# Patient Record
Sex: MALE | Race: WHITE | NOT HISPANIC OR LATINO | Employment: FULL TIME | URBAN - NONMETROPOLITAN AREA
[De-identification: names, ages, dates, MRNs, and addresses within clinical notes are randomized per-mention and may not be internally consistent; named-entity substitution may affect disease eponyms.]

---

## 2017-03-21 LAB
HEP C VIRUS AB: NEGATIVE
HIV1/HIV2 ANTIBODY: NORMAL

## 2021-06-25 ENCOUNTER — OFFICE VISIT (OUTPATIENT)
Dept: FAMILY MEDICINE | Facility: CLINIC | Age: 41
End: 2021-06-25
Payer: COMMERCIAL

## 2021-06-25 VITALS
OXYGEN SATURATION: 100 % | HEIGHT: 69 IN | RESPIRATION RATE: 18 BRPM | BODY MASS INDEX: 31.27 KG/M2 | TEMPERATURE: 97 F | DIASTOLIC BLOOD PRESSURE: 78 MMHG | HEART RATE: 72 BPM | WEIGHT: 211.13 LBS | SYSTOLIC BLOOD PRESSURE: 122 MMHG

## 2021-06-25 DIAGNOSIS — B00.9 HERPES: Primary | ICD-10-CM

## 2021-06-25 DIAGNOSIS — F90.9 ATTENTION DEFICIT HYPERACTIVITY DISORDER (ADHD), UNSPECIFIED ADHD TYPE: ICD-10-CM

## 2021-06-25 PROCEDURE — 3008F PR BODY MASS INDEX (BMI) DOCUMENTED: ICD-10-PCS | Mod: ,,, | Performed by: FAMILY MEDICINE

## 2021-06-25 PROCEDURE — 99214 PR OFFICE/OUTPT VISIT, EST, LEVL IV, 30-39 MIN: ICD-10-PCS | Mod: ,,, | Performed by: FAMILY MEDICINE

## 2021-06-25 PROCEDURE — 3008F BODY MASS INDEX DOCD: CPT | Mod: ,,, | Performed by: FAMILY MEDICINE

## 2021-06-25 PROCEDURE — 99214 OFFICE O/P EST MOD 30 MIN: CPT | Mod: ,,, | Performed by: FAMILY MEDICINE

## 2021-06-25 RX ORDER — VALACYCLOVIR HYDROCHLORIDE 500 MG/1
500 TABLET, FILM COATED ORAL 2 TIMES DAILY
COMMUNITY
Start: 2021-04-30 | End: 2021-06-25 | Stop reason: SDUPTHER

## 2021-06-25 RX ORDER — DEXTROAMPHETAMINE SACCHARATE, AMPHETAMINE ASPARTATE, DEXTROAMPHETAMINE SULFATE AND AMPHETAMINE SULFATE 2.5; 2.5; 2.5; 2.5 MG/1; MG/1; MG/1; MG/1
1 TABLET ORAL 2 TIMES DAILY
Qty: 60 TABLET | Refills: 0 | Status: SHIPPED | OUTPATIENT
Start: 2021-06-25 | End: 2021-06-25 | Stop reason: SDUPTHER

## 2021-06-25 RX ORDER — VALACYCLOVIR HYDROCHLORIDE 500 MG/1
500 TABLET, FILM COATED ORAL 2 TIMES DAILY
Qty: 60 TABLET | Refills: 3 | Status: SHIPPED | OUTPATIENT
Start: 2021-06-25 | End: 2021-09-24 | Stop reason: SDUPTHER

## 2021-06-25 RX ORDER — DEXTROAMPHETAMINE SACCHARATE, AMPHETAMINE ASPARTATE, DEXTROAMPHETAMINE SULFATE AND AMPHETAMINE SULFATE 2.5; 2.5; 2.5; 2.5 MG/1; MG/1; MG/1; MG/1
1 TABLET ORAL 2 TIMES DAILY
Qty: 60 TABLET | Refills: 0 | Status: SHIPPED | OUTPATIENT
Start: 2021-06-25 | End: 2021-09-24 | Stop reason: SDUPTHER

## 2021-06-25 RX ORDER — DEXTROAMPHETAMINE SACCHARATE, AMPHETAMINE ASPARTATE, DEXTROAMPHETAMINE SULFATE AND AMPHETAMINE SULFATE 2.5; 2.5; 2.5; 2.5 MG/1; MG/1; MG/1; MG/1
1 TABLET ORAL 2 TIMES DAILY
COMMUNITY
Start: 2021-05-10 | End: 2021-06-25 | Stop reason: SDUPTHER

## 2021-07-01 ENCOUNTER — TELEPHONE (OUTPATIENT)
Dept: FAMILY MEDICINE | Facility: CLINIC | Age: 41
End: 2021-07-01

## 2021-09-24 ENCOUNTER — OFFICE VISIT (OUTPATIENT)
Dept: FAMILY MEDICINE | Facility: CLINIC | Age: 41
End: 2021-09-24
Payer: COMMERCIAL

## 2021-09-24 VITALS
BODY MASS INDEX: 32.68 KG/M2 | DIASTOLIC BLOOD PRESSURE: 86 MMHG | TEMPERATURE: 99 F | SYSTOLIC BLOOD PRESSURE: 128 MMHG | RESPIRATION RATE: 20 BRPM | WEIGHT: 220.63 LBS | HEIGHT: 69 IN | HEART RATE: 79 BPM | OXYGEN SATURATION: 98 %

## 2021-09-24 DIAGNOSIS — B00.9 HERPES: ICD-10-CM

## 2021-09-24 DIAGNOSIS — F90.9 ATTENTION DEFICIT HYPERACTIVITY DISORDER (ADHD), UNSPECIFIED ADHD TYPE: ICD-10-CM

## 2021-09-24 PROCEDURE — 3074F PR MOST RECENT SYSTOLIC BLOOD PRESSURE < 130 MM HG: ICD-10-PCS | Mod: ,,, | Performed by: FAMILY MEDICINE

## 2021-09-24 PROCEDURE — 3079F PR MOST RECENT DIASTOLIC BLOOD PRESSURE 80-89 MM HG: ICD-10-PCS | Mod: ,,, | Performed by: FAMILY MEDICINE

## 2021-09-24 PROCEDURE — 99213 PR OFFICE/OUTPT VISIT, EST, LEVL III, 20-29 MIN: ICD-10-PCS | Mod: ,,, | Performed by: FAMILY MEDICINE

## 2021-09-24 PROCEDURE — 99213 OFFICE O/P EST LOW 20 MIN: CPT | Mod: ,,, | Performed by: FAMILY MEDICINE

## 2021-09-24 PROCEDURE — 3008F PR BODY MASS INDEX (BMI) DOCUMENTED: ICD-10-PCS | Mod: ,,, | Performed by: FAMILY MEDICINE

## 2021-09-24 PROCEDURE — 1159F PR MEDICATION LIST DOCUMENTED IN MEDICAL RECORD: ICD-10-PCS | Mod: ,,, | Performed by: FAMILY MEDICINE

## 2021-09-24 PROCEDURE — 3008F BODY MASS INDEX DOCD: CPT | Mod: ,,, | Performed by: FAMILY MEDICINE

## 2021-09-24 PROCEDURE — 3074F SYST BP LT 130 MM HG: CPT | Mod: ,,, | Performed by: FAMILY MEDICINE

## 2021-09-24 PROCEDURE — 1159F MED LIST DOCD IN RCRD: CPT | Mod: ,,, | Performed by: FAMILY MEDICINE

## 2021-09-24 PROCEDURE — 3079F DIAST BP 80-89 MM HG: CPT | Mod: ,,, | Performed by: FAMILY MEDICINE

## 2021-09-24 RX ORDER — DEXTROAMPHETAMINE SACCHARATE, AMPHETAMINE ASPARTATE, DEXTROAMPHETAMINE SULFATE AND AMPHETAMINE SULFATE 2.5; 2.5; 2.5; 2.5 MG/1; MG/1; MG/1; MG/1
1 TABLET ORAL 2 TIMES DAILY
Qty: 60 TABLET | Refills: 0 | Status: SHIPPED | OUTPATIENT
Start: 2021-09-24 | End: 2021-11-12 | Stop reason: SDUPTHER

## 2021-09-24 RX ORDER — VALACYCLOVIR HYDROCHLORIDE 500 MG/1
500 TABLET, FILM COATED ORAL 2 TIMES DAILY
Qty: 60 TABLET | Refills: 3 | Status: CANCELLED | OUTPATIENT
Start: 2021-09-24

## 2021-09-24 RX ORDER — VALACYCLOVIR HYDROCHLORIDE 500 MG/1
500 TABLET, FILM COATED ORAL 2 TIMES DAILY
Qty: 60 TABLET | Refills: 3 | Status: SHIPPED | OUTPATIENT
Start: 2021-09-24 | End: 2021-11-12 | Stop reason: SDUPTHER

## 2021-11-12 ENCOUNTER — OFFICE VISIT (OUTPATIENT)
Dept: FAMILY MEDICINE | Facility: CLINIC | Age: 41
End: 2021-11-12
Payer: COMMERCIAL

## 2021-11-12 VITALS
WEIGHT: 221 LBS | DIASTOLIC BLOOD PRESSURE: 76 MMHG | SYSTOLIC BLOOD PRESSURE: 130 MMHG | TEMPERATURE: 97 F | HEIGHT: 70 IN | BODY MASS INDEX: 31.64 KG/M2 | OXYGEN SATURATION: 99 % | RESPIRATION RATE: 20 BRPM | HEART RATE: 72 BPM

## 2021-11-12 DIAGNOSIS — B00.9 HERPES: ICD-10-CM

## 2021-11-12 DIAGNOSIS — F90.9 ATTENTION DEFICIT HYPERACTIVITY DISORDER (ADHD), UNSPECIFIED ADHD TYPE: Primary | ICD-10-CM

## 2021-11-12 DIAGNOSIS — K21.00 GASTROESOPHAGEAL REFLUX DISEASE WITH ESOPHAGITIS WITHOUT HEMORRHAGE: ICD-10-CM

## 2021-11-12 LAB

## 2021-11-12 PROCEDURE — 3008F PR BODY MASS INDEX (BMI) DOCUMENTED: ICD-10-PCS | Mod: ,,, | Performed by: FAMILY MEDICINE

## 2021-11-12 PROCEDURE — 80305 POCT URINE DRUG SCREEN PRESUMP: ICD-10-PCS | Mod: QW,,, | Performed by: FAMILY MEDICINE

## 2021-11-12 PROCEDURE — 3078F PR MOST RECENT DIASTOLIC BLOOD PRESSURE < 80 MM HG: ICD-10-PCS | Mod: ,,, | Performed by: FAMILY MEDICINE

## 2021-11-12 PROCEDURE — 3075F PR MOST RECENT SYSTOLIC BLOOD PRESS GE 130-139MM HG: ICD-10-PCS | Mod: ,,, | Performed by: FAMILY MEDICINE

## 2021-11-12 PROCEDURE — 99214 PR OFFICE/OUTPT VISIT, EST, LEVL IV, 30-39 MIN: ICD-10-PCS | Mod: ,,, | Performed by: FAMILY MEDICINE

## 2021-11-12 PROCEDURE — 3075F SYST BP GE 130 - 139MM HG: CPT | Mod: ,,, | Performed by: FAMILY MEDICINE

## 2021-11-12 PROCEDURE — 99214 OFFICE O/P EST MOD 30 MIN: CPT | Mod: ,,, | Performed by: FAMILY MEDICINE

## 2021-11-12 PROCEDURE — 3008F BODY MASS INDEX DOCD: CPT | Mod: ,,, | Performed by: FAMILY MEDICINE

## 2021-11-12 PROCEDURE — 80305 DRUG TEST PRSMV DIR OPT OBS: CPT | Mod: QW,,, | Performed by: FAMILY MEDICINE

## 2021-11-12 PROCEDURE — 3078F DIAST BP <80 MM HG: CPT | Mod: ,,, | Performed by: FAMILY MEDICINE

## 2021-11-12 RX ORDER — VALACYCLOVIR HYDROCHLORIDE 500 MG/1
500 TABLET, FILM COATED ORAL 2 TIMES DAILY
Qty: 60 TABLET | Refills: 5 | Status: SHIPPED | OUTPATIENT
Start: 2021-11-12 | End: 2022-04-13 | Stop reason: SDUPTHER

## 2021-11-12 RX ORDER — DEXTROAMPHETAMINE SACCHARATE, AMPHETAMINE ASPARTATE MONOHYDRATE, DEXTROAMPHETAMINE SULFATE AND AMPHETAMINE SULFATE 2.5; 2.5; 2.5; 2.5 MG/1; MG/1; MG/1; MG/1
10 CAPSULE, EXTENDED RELEASE ORAL EVERY MORNING
Qty: 60 CAPSULE | Refills: 0 | Status: SHIPPED | OUTPATIENT
Start: 2021-11-12 | End: 2022-01-26 | Stop reason: CLARIF

## 2021-11-12 RX ORDER — OMEPRAZOLE 20 MG/1
20 TABLET, DELAYED RELEASE ORAL EVERY MORNING
COMMUNITY
End: 2022-04-13

## 2021-11-12 RX ORDER — DEXTROAMPHETAMINE SACCHARATE, AMPHETAMINE ASPARTATE MONOHYDRATE, DEXTROAMPHETAMINE SULFATE AND AMPHETAMINE SULFATE 2.5; 2.5; 2.5; 2.5 MG/1; MG/1; MG/1; MG/1
10 CAPSULE, EXTENDED RELEASE ORAL EVERY MORNING
Qty: 60 CAPSULE | Refills: 0 | Status: SHIPPED | OUTPATIENT
Start: 2021-11-12 | End: 2021-11-12

## 2021-11-12 RX ORDER — PANTOPRAZOLE SODIUM 40 MG/1
40 TABLET, DELAYED RELEASE ORAL DAILY
Qty: 60 TABLET | Refills: 11 | Status: SHIPPED | OUTPATIENT
Start: 2021-11-12 | End: 2022-04-13 | Stop reason: SDUPTHER

## 2021-11-12 RX ORDER — DEXTROAMPHETAMINE SACCHARATE, AMPHETAMINE ASPARTATE, DEXTROAMPHETAMINE SULFATE AND AMPHETAMINE SULFATE 2.5; 2.5; 2.5; 2.5 MG/1; MG/1; MG/1; MG/1
1 TABLET ORAL 2 TIMES DAILY
Qty: 60 TABLET | Refills: 0 | Status: SHIPPED | OUTPATIENT
Start: 2021-11-12 | End: 2022-01-26 | Stop reason: SDUPTHER

## 2022-01-14 ENCOUNTER — CLINICAL SUPPORT (OUTPATIENT)
Dept: PRIMARY CARE CLINIC | Facility: CLINIC | Age: 42
End: 2022-01-14

## 2022-01-14 DIAGNOSIS — Z11.1 SCREENING-PULMONARY TB: Primary | ICD-10-CM

## 2022-01-14 DIAGNOSIS — Z02.89 ENCOUNTER FOR PHYSICAL EXAMINATION RELATED TO EMPLOYMENT: ICD-10-CM

## 2022-01-14 PROCEDURE — 86580 TB INTRADERMAL TEST: CPT | Mod: ,,, | Performed by: NURSE PRACTITIONER

## 2022-01-14 PROCEDURE — 92552 PR PURE TONE AUDIOMETRY, AIR: ICD-10-PCS | Mod: ,,, | Performed by: NURSE PRACTITIONER

## 2022-01-14 PROCEDURE — 92552 PURE TONE AUDIOMETRY AIR: CPT | Mod: ,,, | Performed by: NURSE PRACTITIONER

## 2022-01-14 PROCEDURE — 86580 PR  TB INTRADERMAL TEST: ICD-10-PCS | Mod: ,,, | Performed by: NURSE PRACTITIONER

## 2022-01-14 NOTE — PROGRESS NOTES
Subjective:       Patient ID: Hemanth Cottrell is a 41 y.o. male.    Chief Complaint: No chief complaint on file.    HPI  Review of Systems      Objective:      Physical Exam    Assessment:       Problem List Items Addressed This Visit    None     Visit Diagnoses     Screening-pulmonary TB    -  Primary    Relevant Orders    POCT TB Skin Test (Completed)    Encounter for physical examination related to employment              Plan:       See scanned documents in .

## 2022-01-26 ENCOUNTER — OFFICE VISIT (OUTPATIENT)
Dept: FAMILY MEDICINE | Facility: CLINIC | Age: 42
End: 2022-01-26
Payer: COMMERCIAL

## 2022-01-26 VITALS
RESPIRATION RATE: 18 BRPM | BODY MASS INDEX: 31.64 KG/M2 | WEIGHT: 221 LBS | SYSTOLIC BLOOD PRESSURE: 126 MMHG | OXYGEN SATURATION: 99 % | DIASTOLIC BLOOD PRESSURE: 76 MMHG | HEART RATE: 80 BPM | HEIGHT: 70 IN

## 2022-01-26 DIAGNOSIS — F90.9 ATTENTION DEFICIT HYPERACTIVITY DISORDER (ADHD), UNSPECIFIED ADHD TYPE: ICD-10-CM

## 2022-01-26 DIAGNOSIS — B00.9 HERPES: ICD-10-CM

## 2022-01-26 PROCEDURE — 1159F MED LIST DOCD IN RCRD: CPT | Mod: ,,, | Performed by: FAMILY MEDICINE

## 2022-01-26 PROCEDURE — 99213 OFFICE O/P EST LOW 20 MIN: CPT | Mod: ,,, | Performed by: FAMILY MEDICINE

## 2022-01-26 PROCEDURE — 1159F PR MEDICATION LIST DOCUMENTED IN MEDICAL RECORD: ICD-10-PCS | Mod: ,,, | Performed by: FAMILY MEDICINE

## 2022-01-26 PROCEDURE — 3008F BODY MASS INDEX DOCD: CPT | Mod: ,,, | Performed by: FAMILY MEDICINE

## 2022-01-26 PROCEDURE — 99213 PR OFFICE/OUTPT VISIT, EST, LEVL III, 20-29 MIN: ICD-10-PCS | Mod: ,,, | Performed by: FAMILY MEDICINE

## 2022-01-26 PROCEDURE — 3074F PR MOST RECENT SYSTOLIC BLOOD PRESSURE < 130 MM HG: ICD-10-PCS | Mod: ,,, | Performed by: FAMILY MEDICINE

## 2022-01-26 PROCEDURE — 3074F SYST BP LT 130 MM HG: CPT | Mod: ,,, | Performed by: FAMILY MEDICINE

## 2022-01-26 PROCEDURE — 3078F PR MOST RECENT DIASTOLIC BLOOD PRESSURE < 80 MM HG: ICD-10-PCS | Mod: ,,, | Performed by: FAMILY MEDICINE

## 2022-01-26 PROCEDURE — 3008F PR BODY MASS INDEX (BMI) DOCUMENTED: ICD-10-PCS | Mod: ,,, | Performed by: FAMILY MEDICINE

## 2022-01-26 PROCEDURE — 3078F DIAST BP <80 MM HG: CPT | Mod: ,,, | Performed by: FAMILY MEDICINE

## 2022-01-26 RX ORDER — DEXTROAMPHETAMINE SACCHARATE, AMPHETAMINE ASPARTATE, DEXTROAMPHETAMINE SULFATE AND AMPHETAMINE SULFATE 2.5; 2.5; 2.5; 2.5 MG/1; MG/1; MG/1; MG/1
1 TABLET ORAL 2 TIMES DAILY
Qty: 60 TABLET | Refills: 0 | Status: SHIPPED | OUTPATIENT
Start: 2022-01-26 | End: 2022-04-13 | Stop reason: SDUPTHER

## 2022-01-26 RX ORDER — DEXTROAMPHETAMINE SACCHARATE, AMPHETAMINE ASPARTATE, DEXTROAMPHETAMINE SULFATE AND AMPHETAMINE SULFATE 2.5; 2.5; 2.5; 2.5 MG/1; MG/1; MG/1; MG/1
1 TABLET ORAL 2 TIMES DAILY
Qty: 60 TABLET | Refills: 0 | Status: SHIPPED | OUTPATIENT
Start: 2022-01-26 | End: 2022-01-26 | Stop reason: SDUPTHER

## 2022-01-26 NOTE — PROGRESS NOTES
Mateo Wilkinson DO   [address] [place]  [address]      PATIENT NAME: Hemanth Cottrell  : 1980  DATE: 22  MRN: [id number]      Billing Provider: Mateo Wilkinson DO  Level of Service:   Patient PCP Information     Provider PCP Type    Mateo Wilkinson DO General          Reason for Visit / Chief Complaint: ADHD (Routine visit for adderall refill)       Update PCP  Update Chief Complaint         History of Present Illness / Problem Focused Workflow     Hemanth Cottrell presents to the clinic with ADHD (Routine visit for adderall refill)     Patient with a history of a attention deficit disorder and takes Adderall for the same.  He has been tested formally.  He has no chest pain palpitations or side effects from the medication.  Patient was given the long-acting Adderall last month and he did not do well with the medication.  He requested to take the 10 mg Adderall twice daily as he responds better to that.      Review of Systems     Review of Systems   Constitutional: Negative for activity change, appetite change, chills, fatigue and fever.   HENT: Negative for nasal congestion, ear discharge, ear pain, mouth dryness, mouth sores, postnasal drip, sinus pressure/congestion, sore throat and voice change.    Eyes: Negative for pain, discharge, redness, itching and visual disturbance.   Respiratory: Negative for apnea, cough, chest tightness, shortness of breath and wheezing.    Cardiovascular: Negative for chest pain, palpitations and leg swelling.   Gastrointestinal: Negative for abdominal distention, abdominal pain, anal bleeding, blood in stool, change in bowel habit, constipation, diarrhea, nausea, vomiting, reflux and change in bowel habit.   Endocrine: Negative for cold intolerance, heat intolerance, polydipsia, polyphagia and polyuria.   Genitourinary: Negative for difficulty urinating, enuresis, erectile dysfunction, frequency, genital sores, hematuria, hot  flashes, menstrual irregularity, urgency and vaginal dryness.   Musculoskeletal: Negative for arthralgias, back pain, gait problem, leg pain, myalgias and neck pain.   Integumentary:  Negative for rash, mole/lesion, breast mass, breast discharge and breast tenderness.   Allergic/Immunologic: Negative for environmental allergies and food allergies.   Neurological: Negative for dizziness, vertigo, tremors, seizures, syncope, facial asymmetry, speech difficulty, weakness, light-headedness, numbness, headaches, disturbances in coordination, memory loss and coordination difficulties.   Hematological: Negative for adenopathy. Does not bruise/bleed easily.   Psychiatric/Behavioral: Negative for agitation, behavioral problems, confusion, decreased concentration, dysphoric mood, hallucinations, self-injury, sleep disturbance and suicidal ideas. The patient is not nervous/anxious and is not hyperactive.    Breast: Negative for mass and tenderness      Medical / Social / Family History     Past Medical History:   Diagnosis Date    Achilles tendinitis     Adult attention deficit hyperactivity disorder 04/30/2021    Allergic rhinitis     Anxiety disorder     COVID-19 08/2021    x 2 (02/2021)    Depressive disorder     Recurrent genital herpes simplex type 2 infection     Taking multiple medications for chronic disease        Past Surgical History:   Procedure Laterality Date    KNEE ARTHROSCOPY Left 2000       Social History    reports that he has never smoked. He has never used smokeless tobacco. He reports current alcohol use. He reports that he does not use drugs.    Family History  's family history includes Diabetes in his father and mother; Lung cancer in his father; No Known Problems in his brother and sister.    Medications and Allergies     Medications  Outpatient Medications Marked as Taking for the 1/26/22 encounter (Office Visit) with Mateo Wilkinson, DO   Medication Sig Dispense Refill     pantoprazole (PROTONIX) 40 MG tablet Take 1 tablet (40 mg total) by mouth once daily. Take 1 twice daily for 1 week then daily 60 tablet 11    valACYclovir (VALTREX) 500 MG tablet Take 1 tablet (500 mg total) by mouth 2 (two) times daily. 60 tablet 5    [DISCONTINUED] dextroamphetamine-amphetamine 10 mg Tab Take 1 tablet (10 mg total) by mouth 2 (two) times daily. 60 tablet 0       Allergies  Review of patient's allergies indicates:  No Known Allergies    Physical Examination     Vitals:    01/26/22 1126   BP: 126/76   Pulse: 80   Resp: 18     Physical Exam  Constitutional:       General: He is not in acute distress.     Appearance: Normal appearance. He is normal weight.   HENT:      Head: Normocephalic.      Nose: Nose normal.      Mouth/Throat:      Mouth: Mucous membranes are moist.      Pharynx: Oropharynx is clear. No oropharyngeal exudate or posterior oropharyngeal erythema.   Eyes:      General: No scleral icterus.        Right eye: No discharge.         Left eye: No discharge.      Conjunctiva/sclera: Conjunctivae normal.      Pupils: Pupils are equal, round, and reactive to light.   Cardiovascular:      Rate and Rhythm: Normal rate and regular rhythm.      Pulses: Normal pulses.      Heart sounds: Normal heart sounds. No murmur heard.      Pulmonary:      Effort: Pulmonary effort is normal.      Breath sounds: Normal breath sounds. No wheezing.   Abdominal:      General: Abdomen is flat. Bowel sounds are normal.      Tenderness: There is no abdominal tenderness.   Musculoskeletal:         General: Normal range of motion.      Cervical back: Normal range of motion.   Lymphadenopathy:      Cervical: No cervical adenopathy.   Skin:     General: Skin is warm.      Capillary Refill: Capillary refill takes less than 2 seconds.   Neurological:      General: No focal deficit present.      Mental Status: He is alert and oriented to person, place, and time. Mental status is at baseline.   Psychiatric:         Mood  and Affect: Mood normal.         Behavior: Behavior normal.         Thought Content: Thought content normal.         Judgment: Judgment normal.               Lab Results   Component Value Date    WBC 4.98 04/30/2021    HGB 15.7 04/30/2021    HCT 46.4 04/30/2021    MCV 89.9 04/30/2021     04/30/2021          Sodium   Date Value Ref Range Status   04/30/2021 136 136 - 145 mmol/L Final     Potassium   Date Value Ref Range Status   04/30/2021 3.8 3.5 - 5.1 mmol/L Final     Chloride   Date Value Ref Range Status   04/30/2021 103 98 - 107 mmol/L Final     CO2   Date Value Ref Range Status   04/30/2021 27 21 - 32 mmol/L Final     Glucose   Date Value Ref Range Status   04/30/2021 79 74 - 106 mg/dL Final     BUN   Date Value Ref Range Status   04/30/2021 12 7 - 18 mg/dL Final     Creatinine   Date Value Ref Range Status   04/30/2021 0.88 0.70 - 1.30 mg/dL Final     Calcium   Date Value Ref Range Status   04/30/2021 8.8 8.5 - 10.1 mg/dL Final     Total Protein   Date Value Ref Range Status   04/30/2021 7.5 6.4 - 8.2 g/dL Final     Albumin   Date Value Ref Range Status   04/30/2021 4.3 3.5 - 5.0 g/dL Final     Bilirubin, Total   Date Value Ref Range Status   04/30/2021 1.8 (H) >0.0 - 1.2 mg/dL Final     Alk Phos   Date Value Ref Range Status   04/30/2021 61 45 - 115 U/L Final     AST   Date Value Ref Range Status   04/30/2021 27 15 - 37 U/L Final     ALT   Date Value Ref Range Status   04/30/2021 30 16 - 61 U/L Final     Anion Gap   Date Value Ref Range Status   04/30/2021 10 7 - 16 mmol/L Final     eGFR   Date Value Ref Range Status   04/30/2021 100 >=60 mL/min/1.73m² Final      No image results found.     Procedures   Assessment and Plan (including Health Maintenance)      Problem List  Smart Sets  Document Outside HM   :    Plan:         Health Maintenance Due   Topic Date Due    Hepatitis C Screening  Never done    COVID-19 Vaccine (1) Never done    HIV Screening  Never done    TETANUS VACCINE  Never done     Influenza Vaccine (1) 09/01/2021       Problem List Items Addressed This Visit        Psychiatric    Attention deficit hyperactivity disorder (ADHD)    Current Assessment & Plan     Patient with history of attention deficit disorder and takes Adderall 10 mg twice daily.  Refilled today and did give him an extra month refill.   was pulled and no evidence of abuse.  Will do a presumptive drug screen at his next visit.         Relevant Medications    dextroamphetamine-amphetamine 10 mg Tab       ID    Herpes    Current Assessment & Plan     Patient takes Valtrex no change in his medicines follow-up p.r.n..               Health Maintenance Topics with due status: Not Due       Topic Last Completion Date    Lipid Panel 04/30/2021       Future Appointments   Date Time Provider Department Center   4/27/2022 10:30 AM Mateo Wilkinson DO [facility]        Follow up in about 4 weeks (around 2/23/2022).     Signature:  DO [facility]  [address] [address] [place]  [address]    Date of encounter: 1/26/22

## 2022-01-26 NOTE — ASSESSMENT & PLAN NOTE
Patient with history of attention deficit disorder and takes Adderall 10 mg twice daily.  Refilled today and did give him an extra month refill.   was pulled and no evidence of abuse.  Will do a presumptive drug screen at his next visit.

## 2022-04-13 ENCOUNTER — OFFICE VISIT (OUTPATIENT)
Dept: FAMILY MEDICINE | Facility: CLINIC | Age: 42
End: 2022-04-13
Payer: COMMERCIAL

## 2022-04-13 VITALS
SYSTOLIC BLOOD PRESSURE: 122 MMHG | RESPIRATION RATE: 18 BRPM | HEIGHT: 70 IN | DIASTOLIC BLOOD PRESSURE: 78 MMHG | HEART RATE: 85 BPM | BODY MASS INDEX: 29.92 KG/M2 | OXYGEN SATURATION: 98 % | TEMPERATURE: 98 F | WEIGHT: 209 LBS

## 2022-04-13 DIAGNOSIS — Z79.899 ENCOUNTER FOR LONG-TERM (CURRENT) USE OF MEDICATIONS: ICD-10-CM

## 2022-04-13 DIAGNOSIS — B00.9 HERPES: ICD-10-CM

## 2022-04-13 DIAGNOSIS — K21.00 GASTROESOPHAGEAL REFLUX DISEASE WITH ESOPHAGITIS WITHOUT HEMORRHAGE: ICD-10-CM

## 2022-04-13 DIAGNOSIS — E55.9 VITAMIN D DEFICIENCY: ICD-10-CM

## 2022-04-13 DIAGNOSIS — F90.9 ATTENTION DEFICIT HYPERACTIVITY DISORDER (ADHD), UNSPECIFIED ADHD TYPE: Primary | ICD-10-CM

## 2022-04-13 PROBLEM — U07.1 COVID-19: Status: RESOLVED | Noted: 2021-08-01 | Resolved: 2022-04-13

## 2022-04-13 LAB
CTP QC/QA: YES
POC (AMP) AMPHETAMINE: ABNORMAL
POC (BAR) BARBITURATES: NEGATIVE
POC (BUP) BUPRENORPHINE: NEGATIVE
POC (BZO) BENZODIAZEPINES: NEGATIVE
POC (COC) COCAINE: NEGATIVE
POC (MDMA) METHYLENEDIOXYMETHAMPHETAMINE 3,4: NEGATIVE
POC (MET) METHAMPHETAMINE: NEGATIVE
POC (MOP) OPIATES: NEGATIVE
POC (MTD) METHADONE: NEGATIVE
POC (OXY) OXYCODONE: NEGATIVE
POC (PCP) PHENCYCLIDINE: NEGATIVE
POC (TCA) TRICYCLIC ANTIDEPRESSANTS: NEGATIVE
POC TEMPERATURE (URINE): 90
POC THC: NEGATIVE

## 2022-04-13 PROCEDURE — 99213 PR OFFICE/OUTPT VISIT, EST, LEVL III, 20-29 MIN: ICD-10-PCS | Mod: ,,, | Performed by: NURSE PRACTITIONER

## 2022-04-13 PROCEDURE — 3008F PR BODY MASS INDEX (BMI) DOCUMENTED: ICD-10-PCS | Mod: ,,, | Performed by: NURSE PRACTITIONER

## 2022-04-13 PROCEDURE — 3074F SYST BP LT 130 MM HG: CPT | Mod: ,,, | Performed by: NURSE PRACTITIONER

## 2022-04-13 PROCEDURE — 1159F PR MEDICATION LIST DOCUMENTED IN MEDICAL RECORD: ICD-10-PCS | Mod: ,,, | Performed by: NURSE PRACTITIONER

## 2022-04-13 PROCEDURE — 1159F MED LIST DOCD IN RCRD: CPT | Mod: ,,, | Performed by: NURSE PRACTITIONER

## 2022-04-13 PROCEDURE — 3078F DIAST BP <80 MM HG: CPT | Mod: ,,, | Performed by: NURSE PRACTITIONER

## 2022-04-13 PROCEDURE — 80305 POCT URINE DRUG SCREEN PRESUMP: ICD-10-PCS | Mod: QW,,, | Performed by: NURSE PRACTITIONER

## 2022-04-13 PROCEDURE — 3008F BODY MASS INDEX DOCD: CPT | Mod: ,,, | Performed by: NURSE PRACTITIONER

## 2022-04-13 PROCEDURE — 1160F RVW MEDS BY RX/DR IN RCRD: CPT | Mod: ,,, | Performed by: NURSE PRACTITIONER

## 2022-04-13 PROCEDURE — 3044F HG A1C LEVEL LT 7.0%: CPT | Mod: ,,, | Performed by: NURSE PRACTITIONER

## 2022-04-13 PROCEDURE — 3044F PR MOST RECENT HEMOGLOBIN A1C LEVEL <7.0%: ICD-10-PCS | Mod: ,,, | Performed by: NURSE PRACTITIONER

## 2022-04-13 PROCEDURE — 1160F PR REVIEW ALL MEDS BY PRESCRIBER/CLIN PHARMACIST DOCUMENTED: ICD-10-PCS | Mod: ,,, | Performed by: NURSE PRACTITIONER

## 2022-04-13 PROCEDURE — 3074F PR MOST RECENT SYSTOLIC BLOOD PRESSURE < 130 MM HG: ICD-10-PCS | Mod: ,,, | Performed by: NURSE PRACTITIONER

## 2022-04-13 PROCEDURE — 3078F PR MOST RECENT DIASTOLIC BLOOD PRESSURE < 80 MM HG: ICD-10-PCS | Mod: ,,, | Performed by: NURSE PRACTITIONER

## 2022-04-13 PROCEDURE — 99213 OFFICE O/P EST LOW 20 MIN: CPT | Mod: ,,, | Performed by: NURSE PRACTITIONER

## 2022-04-13 PROCEDURE — 80305 DRUG TEST PRSMV DIR OPT OBS: CPT | Mod: QW,,, | Performed by: NURSE PRACTITIONER

## 2022-04-13 RX ORDER — VALACYCLOVIR HYDROCHLORIDE 500 MG/1
500 TABLET, FILM COATED ORAL 2 TIMES DAILY
Qty: 60 TABLET | Refills: 5 | Status: SHIPPED | OUTPATIENT
Start: 2022-04-13 | End: 2022-05-02 | Stop reason: SDUPTHER

## 2022-04-13 RX ORDER — DEXTROAMPHETAMINE SACCHARATE, AMPHETAMINE ASPARTATE, DEXTROAMPHETAMINE SULFATE AND AMPHETAMINE SULFATE 2.5; 2.5; 2.5; 2.5 MG/1; MG/1; MG/1; MG/1
1 TABLET ORAL 2 TIMES DAILY
Qty: 60 TABLET | Refills: 0 | Status: SHIPPED | OUTPATIENT
Start: 2022-04-13 | End: 2022-04-13 | Stop reason: SDUPTHER

## 2022-04-13 RX ORDER — DEXTROAMPHETAMINE SACCHARATE, AMPHETAMINE ASPARTATE, DEXTROAMPHETAMINE SULFATE AND AMPHETAMINE SULFATE 2.5; 2.5; 2.5; 2.5 MG/1; MG/1; MG/1; MG/1
1 TABLET ORAL 2 TIMES DAILY
Qty: 60 TABLET | Refills: 0 | Status: SHIPPED | OUTPATIENT
Start: 2022-04-13 | End: 2022-05-02 | Stop reason: SDUPTHER

## 2022-04-13 RX ORDER — PANTOPRAZOLE SODIUM 40 MG/1
40 TABLET, DELAYED RELEASE ORAL 2 TIMES DAILY
Qty: 60 TABLET | Refills: 5 | Status: SHIPPED | OUTPATIENT
Start: 2022-04-13 | End: 2022-05-02 | Stop reason: SDUPTHER

## 2022-04-13 RX ORDER — ERGOCALCIFEROL 1.25 MG/1
50000 CAPSULE ORAL
Qty: 4 CAPSULE | Refills: 2 | Status: SHIPPED | OUTPATIENT
Start: 2022-04-13 | End: 2022-06-20

## 2022-04-13 RX ORDER — ERGOCALCIFEROL 1.25 MG/1
50000 CAPSULE ORAL
COMMUNITY
Start: 2022-02-08 | End: 2022-04-13 | Stop reason: SDUPTHER

## 2022-04-13 NOTE — PROGRESS NOTES
"   JACINTO Norman   [facility]  [address]  [place] [address]     PATIENT NAME: Hemanth Cottrell  : 1980  DATE: 22  MRN: [id number]      Billing Provider: JACINTO Norman  Level of Service: IN OFFICE/OUTPT VISIT, EST, LEVL III, 20-29 MIN  Patient PCP Information     Provider PCP Type    Mateo Wilkinson DO General          Reason for Visit / Chief Complaint: Follow-up ("Medication refill")       Update PCP  Update Chief Complaint         History of Present Illness / Problem Focused Workflow     Hemanth Cottrell presents to the clinic for refills on routine medication.   Hx of ADHD and reports having testing in the past 5 years.      Review of Systems     Review of Systems   Constitutional: Negative.  Negative for activity change, appetite change, fatigue and unexpected weight change.   Eyes: Negative for visual disturbance.   Respiratory: Negative for cough, chest tightness and shortness of breath.    Cardiovascular: Negative for chest pain, palpitations and leg swelling.   Gastrointestinal: Negative for abdominal pain, change in bowel habit, nausea, vomiting and change in bowel habit.   Endocrine: Negative for cold intolerance, heat intolerance, polyphagia and polyuria.   Neurological: Negative for dizziness, weakness and headaches.   Psychiatric/Behavioral: The patient is not hyperactive.         Medical / Social / Family History     Past Medical History:   Diagnosis Date    Achilles tendinitis     Allergic rhinitis     Anxiety disorder     COVID-19 08/2021    x 2 (2021)    Depressive disorder     Recurrent genital herpes simplex type 2 infection     Taking multiple medications for chronic disease        Past Surgical History:   Procedure Laterality Date    KNEE ARTHROSCOPY Left        Social History    reports that he has never smoked. He has never used smokeless tobacco. He reports current alcohol use. He reports that he does not use " "drugs.    Family History  's family history includes Diabetes in his father and mother; Lung cancer in his father; No Known Problems in his brother and sister.    Medications and Allergies     Medications  Outpatient Medications Marked as Taking for the 4/13/22 encounter (Office Visit) with JACINTO Guo   Medication Sig Dispense Refill    semaglutide (OZEMPIC) 1 mg/dose (2 mg/1.5 mL) PnIj Inject 1 mg into the skin every 7 days.      [DISCONTINUED] dextroamphetamine-amphetamine 10 mg Tab Take 1 tablet (10 mg total) by mouth 2 (two) times daily. 60 tablet 0    [DISCONTINUED] pantoprazole (PROTONIX) 40 MG tablet Take 1 tablet (40 mg total) by mouth once daily. Take 1 twice daily for 1 week then daily 60 tablet 11    [DISCONTINUED] valACYclovir (VALTREX) 500 MG tablet Take 1 tablet (500 mg total) by mouth 2 (two) times daily. 60 tablet 5       Allergies  Review of patient's allergies indicates:  No Known Allergies    Physical Examination     Vitals:    04/13/22 1039   BP: 122/78   Pulse: 85   Resp: 18   Temp: 98.1 °F (36.7 °C)   SpO2: 98%   Weight: 94.8 kg (209 lb)   Height: 5' 10" (1.778 m)      Physical Exam  Constitutional:       General: He is not in acute distress.     Appearance: Normal appearance.   Neck:      Thyroid: No thyromegaly.      Vascular: No carotid bruit.   Cardiovascular:      Rate and Rhythm: Normal rate and regular rhythm.      Pulses: Normal pulses.      Heart sounds: Normal heart sounds. No murmur heard.  Pulmonary:      Effort: Pulmonary effort is normal. No accessory muscle usage or respiratory distress.      Breath sounds: Normal breath sounds. No wheezing, rhonchi or rales.   Abdominal:      General: Bowel sounds are normal.      Palpations: Abdomen is soft.   Musculoskeletal:         General: Normal range of motion.      Cervical back: Neck supple.   Skin:     General: Skin is warm and dry.      Capillary Refill: Capillary refill takes less than 2 seconds.      Coloration: " Skin is not jaundiced or pale.   Neurological:      Mental Status: He is alert and oriented to person, place, and time.      Cranial Nerves: Cranial nerves are intact.      Gait: Gait is intact.   Psychiatric:         Mood and Affect: Mood normal.         Behavior: Behavior normal.          Assessment and Plan (including Health Maintenance)      Problem List  Smart Sets  Document Outside HM   :        Health Maintenance Due   Topic Date Due    Hepatitis C Screening  Never done    COVID-19 Vaccine (1) Never done    HIV Screening  Never done    TETANUS VACCINE  Never done    Influenza Vaccine (1) 09/01/2021       Problem List Items Addressed This Visit        Psychiatric    Attention deficit hyperactivity disorder (ADHD) - Primary    Relevant Medications    dextroamphetamine-amphetamine 10 mg Tab       Other    Gastroesophageal reflux disease with esophagitis without hemorrhage    Relevant Medications    pantoprazole (PROTONIX) 40 MG tablet      Other Visit Diagnoses     Herpes        Continue current medication    Relevant Medications    valACYclovir (VALTREX) 500 MG tablet    Vitamin D deficiency        Continue current medication    Relevant Medications    ergocalciferol (ERGOCALCIFEROL) 50,000 unit Cap    Encounter for long-term (current) use of medications        Relevant Orders    POCT Urine Drug Screen Presump (Completed)        Attention deficit hyperactivity disorder (ADHD), unspecified ADHD type  Comments:  Continue current medication. Denies diversion,  good, UDS consistent, no signs of aberrant behavior noted  Orders:  -     Discontinue: dextroamphetamine-amphetamine 10 mg Tab; Take 1 tablet (10 mg total) by mouth 2 (two) times daily.  Dispense: 60 tablet; Refill: 0  -     dextroamphetamine-amphetamine 10 mg Tab; Take 1 tablet (10 mg total) by mouth 2 (two) times daily.  Dispense: 60 tablet; Refill: 0    Herpes  Comments:  Continue current medication  Orders:  -     valACYclovir (VALTREX) 500 MG  tablet; Take 1 tablet (500 mg total) by mouth 2 (two) times daily.  Dispense: 60 tablet; Refill: 5    Gastroesophageal reflux disease with esophagitis without hemorrhage  -     pantoprazole (PROTONIX) 40 MG tablet; Take 1 tablet (40 mg total) by mouth 2 (two) times daily.  Dispense: 60 tablet; Refill: 5    Vitamin D deficiency  Comments:  Continue current medication  Orders:  -     ergocalciferol (ERGOCALCIFEROL) 50,000 unit Cap; Take 1 capsule (50,000 Units total) by mouth every 7 days.  Dispense: 4 capsule; Refill: 2    Encounter for long-term (current) use of medications  -     POCT Urine Drug Screen Presump       Health Maintenance Topics with due status: Not Due       Topic Last Completion Date    Lipid Panel 02/07/2022       Procedures     Future Appointments   Date Time Provider Department Center   4/27/2022 10:30 AM Mateo Wilkinson DO [facility]        Follow up in about 2 months (around 6/13/2022).     Signature:  JACINTO Norman    Date of encounter: 4/13/22     HPI:  61 yo M pmh htn, hld, ROSS, depression presents to the ED with abdominal and back pain. States the pain started suddenly today and was radiating from mid back to the front. Patient thought he has muscle pain and went to chiropractor had heat therapy and offered no relief. States that the pain has significantly decreased since he has been in the ED. Denies fevers, chills, nausea, vomiting, CP, SOB, palpitations. 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 Also states that he had been seeing psychiatrist and is taking antidepressant medications due to recent death of his son. Also states that his daughter recently finished drug rehab.     In the ED, VS 97.7, HR 99, /78, RR 12, SpO2 97 on rm air. WBC 12.2, Hg 15.5, Hct 46.0, platelet 206, Na 135, K 4.7, Cl 100, Co2 27, BUN 13, Cr 0.87, glucose 126, , ALT 97, Lipase 1007, Creatine kinase 361, troponin <0.015, ProBNP 22. UA small ketones, pH 9.0    Detox:  Chart Reviewed   feels better today  Drinks Vodka 3 to 4 per day  last drank 2 days ago  minimal tremor  no Cocaine or POT  On Klonopin states 1 mg BID Istop checked     Imaging:   CT angio chest, abdomen and pelvis with IV contast: No evidence of aortic dissection. Inflammatory change along the head of the pancreas most likely   pancreatitis. Small area of low density along the posterior aspect of the pancreatic head may be related to edema although early necrosis or an underlying lesion are not excluded. Follow-up CT or MRI recommended if there are no contraindications.  Low density within the portal vein/SMV in the region of the pancreatic head is presumably related to mixing of opacified and unopacified blood. This can also be further evaluated on follow-up imaging.  Gallbladder US: no gallstones  EKG: NSR at 88 bpm    Received flexeril 10 mg oral x 1, 1 L NaCl 0.9% bolus, famotidine 20 mg IV x 1, morphone 4 mg IV x1, zofran 4 mg IV x 1. (15 Mar 2018 03:57)      PAST MEDICAL & SURGICAL HISTORY:  Low testosterone in male  Depression  Insomnia  HLD (hyperlipidemia)  HTN (hypertension)  H/O knee surgery: b/l knees  H/O shoulder surgery: right      Allergies    No Known Allergies    Intolerances        FAMILY HISTORY:  Family history of squamous cell carcinoma (Father): of parotid  Family history of hypertension (Father)      SOCIAL HISTORY:    REVIEW OF SYSTEMS:    Constitutional: No fever, weight loss or fatigue  ENT:  No difficulty hearing, tinnitus, vertigo; No sinus or throat pain  Neck: No pain or stiffness  Respiratory: No cough, wheezing, chills or hemoptysis  Cardiovascular: No chest pain, palpitations, shortness of breath, dizziness or leg swelling  Gastrointestinal: No abdominal or epigastric pain. No nausea, vomiting or hematemesis; No diarrhea or constipation. No melena or hematochezia.  Neurological: No headaches, memory loss, loss of strength, numbness or tremors  Musculoskeletal: No joint pain or swelling; No muscle, back or extremity pain  Psychiatric: No depression, anxiety, mood swings or difficulty sleeping    MEDICATIONS  (STANDING):  amLODIPine   Tablet 10 milliGRAM(s) Oral daily  atorvastatin 20 milliGRAM(s) Oral at bedtime  clonazePAM Tablet 1 milliGRAM(s) Oral two times a day  enoxaparin Injectable 40 milliGRAM(s) SubCutaneous daily  escitalopram 20 milliGRAM(s) Oral at bedtime  folic acid 1 milliGRAM(s) Oral daily  lisinopril 40 milliGRAM(s) Oral two times a day  multivitamin 1 Tablet(s) Oral daily  pantoprazole  Injectable 40 milliGRAM(s) IV Push daily  QUEtiapine 100 milliGRAM(s) Oral at bedtime  sodium chloride 0.9%. 1000 milliLiter(s) (125 mL/Hr) IV Continuous <Continuous>  thiamine 100 milliGRAM(s) Oral daily    MEDICATIONS  (PRN):  LORazepam   Injectable 2 milliGRAM(s) IV Push every 2 hours PRN CIWA-Ar score increase by 2 points and a total score of 7 or less  morphine  - Injectable 2 milliGRAM(s) IV Push every 6 hours PRN Moderate Pain (4 - 6)  morphine  - Injectable 4 milliGRAM(s) IV Push every 6 hours PRN Severe Pain (7 - 10)  morphine  - Injectable 1 milliGRAM(s) IV Push every 6 hours PRN Mild Pain (1 - 3)      Vital Signs Last 24 Hrs  T(C): 36.2 (15 Mar 2018 06:41), Max: 36.7 (15 Mar 2018 05:40)  T(F): 97.2 (15 Mar 2018 06:41), Max: 98 (15 Mar 2018 05:40)  HR: 84 (15 Mar 2018 06:41) (84 - 99)  BP: 151/84 (15 Mar 2018 06:41) (142/78 - 151/84)  BP(mean): --  RR: 16 (15 Mar 2018 06:41) (12 - 16)  SpO2: 93% (15 Mar 2018 06:41) (93% - 97%)    PHYSICAL EXAM:    Constitutional: NAD, well-groomed, well-developed  HEENT: PERRLA, EOMI, Normal Hearing, MMM  Neck: No LAD, No JVD  Back: Normal spine flexure, No CVA tenderness  Respiratory: CTAB/L  Cardiovascular: S1 and S2, RRR, no M/G/R  Gastrointestinal: BS+, soft, slight mid epigastric tenderness  Extremities: No peripheral edema  Vascular: 2+ peripheral pulses  Neurological: A/O x 3, no focal deficits no tremors    LABS:                        15.5   12.2  )-----------( 206      ( 14 Mar 2018 23:12 )             46.0     03-14    135  |  100  |  13  ----------------------------<  126<H>  4.7   |  27  |  0.87    Ca    8.4<L>      14 Mar 2018 23:12  Phos  3.0     03-15  Mg     1.8     03-15    TPro  7.8  /  Alb  3.5  /  TBili  0.6  /  DBili  x   /  AST  101<H>  /  ALT  97<H>  /  AlkPhos  99  03-14      Urinalysis Basic - ( 15 Mar 2018 01:25 )    Color: Yellow / Appearance: Clear / S.010 / pH: x  Gluc: x / Ketone: Small  / Bili: Negative / Urobili: Negative   Blood: x / Protein: Negative / Nitrite: Negative   Leuk Esterase: Negative / RBC: x / WBC x   Sq Epi: x / Non Sq Epi: x / Bacteria: x      Drug Screen Urine:  Alcohol Level        RADIOLOGY & ADDITIONAL STUDIES:    Assessment and Plan:    Alcoholism: Monitor  for withdrawal                   Restart Klonopin                   Send Tox Screen                   Discussed with Dr. Curry Chief Complaint:  Patient is a 60y old  Male who presents with a chief complaint of abdominal pain (15 Mar 2018 03:57)      HPI:61 yo M pmh htn, hld, ROSS, depression presents to the ED with abdominal and back pain. States the pain started suddenly today and was radiating from mid back to the front. Patient thought he has muscle pain and went to chiropractor had heat therapy and offered no relief. States that the pain has significantly decreased since he has been in the ED. Denies fevers, chills, nausea, vomiting, CP, SOB, palpitations. 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 Also states that he had been seeing psychiatrist and is taking antidepressant medications due to recent death of his son. Also states that his daughter recently finished drug rehab.     Imaging:   CT angio chest, abdomen and pelvis with IV contast: No evidence of aortic dissection. Inflammatory Call to make an appointment [phone] along the head of the pancreas most likely   pancreatitis. Small area of low density along the posterior aspect of the pancreatic head may be related to edema although early necrosis or an underlying lesion are not excluded. Follow-up CT or MRI recommended if there are no contraindications.  Low density within the portal vein/SMV in the region of the pancreatic head is presumably related to mixing of opacified and unopacified blood. This can also be further evaluated on follow-up imaging.  Gallbladder US: no gallstones  EKG: NSR at 88 bpm    Pt states he had an EGD and colonoscopy within 5-7 years which were reportedly normal.       Allergies:  No Known Allergies      Medications:  amLODIPine   Tablet 10 milliGRAM(s) Oral daily  atorvastatin 20 milliGRAM(s) Oral at bedtime  clonazePAM Tablet 1 milliGRAM(s) Oral two times a day  enoxaparin Injectable 40 milliGRAM(s) SubCutaneous daily  escitalopram 20 milliGRAM(s) Oral at bedtime  folic acid 1 milliGRAM(s) Oral daily  lisinopril 40 milliGRAM(s) Oral two times a day  LORazepam   Injectable 2 milliGRAM(s) IV Push every 2 hours PRN  morphine  - Injectable 2 milliGRAM(s) IV Push every 6 hours PRN  morphine  - Injectable 4 milliGRAM(s) IV Push every 6 hours PRN  morphine  - Injectable 1 milliGRAM(s) IV Push every 6 hours PRN  multivitamin 1 Tablet(s) Oral daily  pantoprazole  Injectable 40 milliGRAM(s) IV Push daily  QUEtiapine 100 milliGRAM(s) Oral at bedtime  sodium chloride 0.9%. 1000 milliLiter(s) IV Continuous <Continuous>  thiamine 100 milliGRAM(s) Oral daily      PMHX/PSHX:  Low testosterone in male  Depression  Insomnia  HLD (hyperlipidemia)  HTN (hypertension)  H/O knee surgery  H/O shoulder surgery      Family history:  Family history of squamous cell carcinoma (Father)  Family history of hypertension (Father)      Social History: admits to atleast 4 drinks daily, denies tobacco  and illicit drug use.     ROS:     General:  No wt loss, fevers, chills, night sweats, fatigue,   Eyes:  Good vision, no reported pain  ENT:  No sore throat, pain, runny nose, dysphagia  CV:  No pain, palpitations, hypo/hypertension  Resp:  No dyspnea, cough, tachypnea, wheezing  GI:  + diffuse mid abdominal pain, No nausea, No vomiting, No diarrhea, No constipation, No weight loss, No fever, No pruritis, No rectal bleeding, No tarry stools, No dysphagia,  :  No pain, bleeding, incontinence, nocturia  Muscle:  No pain, weakness  Neuro:  No weakness, tingling, memory problems  Psych:  No fatigue, insomnia, mood problems, depression  Endocrine:  No polyuria, polydipsia, cold/heat intolerance  Heme:  No petechiae, ecchymosis, easy bruisability  Skin:  No rash, tattoos, scars, edema      PHYSICAL EXAM:   Vital Signs:  Vital Signs Last 24 Hrs  T(C): 36.2 (15 Mar 2018 06:41), Max: 36.7 (15 Mar 2018 05:40)  T(F): 97.2 (15 Mar 2018 06:41), Max: 98 (15 Mar 2018 05:40)  HR: 84 (15 Mar 2018 06:41) (84 - 99)  BP: 151/84 (15 Mar 2018 06:41) (142/78 - 151/84)  BP(mean): --  RR: 16 (15 Mar 2018 06:41) (12 - 16)  SpO2: 93% (15 Mar 2018 06:41) (93% - 97%)  Daily     Daily Weight in k.4 (15 Mar 2018 06:41)    GENERAL:  Appears stated age, well-groomed, well-nourished, no distress  HEENT:  NC/AT,  conjunctivae clear and pink, no thyromegaly, nodules, adenopathy, no JVD, sclera -anicteric  CHEST:  Full & symmetric excursion, no increased effort, breath sounds clear  HEART:  Regular rhythm, S1, S2, no murmur/rub/S3/S4, no abdominal bruit, no edema  ABDOMEN:  Soft, non-tender, non-distended, normoactive bowel sounds,  no masses ,no hepato-splenomegaly, no signs of chronic liver disease  EXTREMITIES:  no cyanosis,clubbing or edema  SKIN:  No rash/erythema/ecchymoses/petechiae/wounds/abscess/warm/dry  NEURO:  Alert, oriented, no asterixis, no tremor, no encephalopathy    LABS:                        15.5   12.2  )-----------( 206      ( 14 Mar 2018 23:12 )             46.0     03-14    135  |  100  |  13  ----------------------------<  126<H>  4.7   |  27  |  0.87    Ca    8.4<L>      14 Mar 2018 23:12  Phos  3.0     -15  Mg     1.8     -15    TPro  7.8  /  Alb  3.5  /  TBili  0.6  /  DBili  x   /  AST  101<H>  /  ALT  97<H>  /  AlkPhos  99  03-14    LIVER FUNCTIONS - ( 14 Mar 2018 23:12 )  Alb: 3.5 g/dL / Pro: 7.8 g/dL / ALK PHOS: 99 U/L / ALT: 97 U/L / AST: 101 U/L / GGT: x             Urinalysis Basic - ( 15 Mar 2018 01:25 )    Color: Yellow / Appearance: Clear / S.010 / pH: x  Gluc: x / Ketone: Small  / Bili: Negative / Urobili: Negative   Blood: x / Protein: Negative / Nitrite: Negative   Leuk Esterase: Negative / RBC: x / WBC x   Sq Epi: x / Non Sq Epi: x / Bacteria: x      Amylase Serum63      Lipase [id number]       Ammonia--      Imaging:< from: CT Angio Abdomen and Pelvis w/ IV Cont (03.15.18 @ 01:06) >  EXAM:  CT ANGIO CHEST (W)AW IC                            PROCEDURE DATE:  03/15/2018          INTERPRETATION:  CLINICAL INFORMATION: Chest pain radiating to the back    COMPARISON: None    PROCEDURE:   CT Angiography of the Chest, Abdomen and Pelvis.   Precontrast imaging was performed through the chest followed by arterial   phase imaging of the chest, abdomen and pelvis.  Intravenous contrast: 95 ml Omnipaque 350.   Oral contrast:None.  Sagittal and coronal reformats were performed as well as MIPS.  FINDINGS:    CHEST:     LUNGS, LARGE AIRWAYS, PLEURA: Patent central airways. No consolidation or   pneumothorax.  No pleural effusion. Bibasilar atelectasis.  VESSELS: Coronary atherosclerosis. No evidence of aortic dissection.  HEART: Heart size is normal. No pericardial effusion.  MEDIASTINUM AND ANDREW: No lymphadenopathy.  CHEST WALL AND LOWER NECK: Degenerative changes along the spine.    ABDOMEN AND PELVIS:    Arterial phase imaging of the following:  LIVER: Hepatic steatosis  GALLBLADDER: Within normal limits.  SPLEEN: Within normal limits.  PANCREAS: Inflammatory change along the head of the pancreas compatible   with pancreatitis. Small 1.6 cm area of low density alongthe posterior   head may represent edema although early necrosis or underlying lesion not   excluded.  ADRENALS: Within normal limits.  KIDNEYS/URETERS: Right renal hypodensity is too small to characterize. No   hydronephrosis    BLADDER: Collapsed  REPRODUCTIVE ORGANS: Unremarkable    BOWEL: Limited without oral contrast. No evidence of bowel obstruction.   Normal appendix  PERITONEUM: No ascites.  VESSELS:  Aortic atherosclerosis. Low density along the portal vein in   the region of the pancreas is likely related to mixing of opacified and   unopacified blood.  RETROPERITONEUM: No lymphadenopathy.    ABDOMINAL WALL: Small fat-containing left inguinal hernia  BONES: Degenerative changes    IMPRESSION:    No evidence of aortic dissection.     Inflammatory change along the head of the pancreas most likely   pancreatitis. Small area of low density along the posterior aspect of the   pancreatic head may be related to edema although early necrosis or an   underlying lesion are not excluded. Follow-up CT or MRI recommended if   there are no contraindications.    Low density within the portal vein/SMV in the region of the pancreatic   head is presumably related to mixing of opacified and unopacified blood.   This can also be further evaluated on follow-up imaging.                  LAURA STOVER M.D., ATTENDING RADIOLOGIST  This document has been electronically signed. Mar 15 2018  2:06AM

## 2022-05-01 ENCOUNTER — HOSPITAL ENCOUNTER (EMERGENCY)
Facility: HOSPITAL | Age: 42
Discharge: HOME | End: 2022-05-01
Attending: EMERGENCY MEDICINE
Payer: COMMERCIAL

## 2022-05-01 VITALS
HEIGHT: 72 IN | OXYGEN SATURATION: 96 % | BODY MASS INDEX: 28.85 KG/M2 | RESPIRATION RATE: 10 BRPM | WEIGHT: 213 LBS | SYSTOLIC BLOOD PRESSURE: 155 MMHG | DIASTOLIC BLOOD PRESSURE: 88 MMHG | TEMPERATURE: 98 F | HEART RATE: 90 BPM

## 2022-05-01 DIAGNOSIS — R55 SYNCOPE: ICD-10-CM

## 2022-05-01 DIAGNOSIS — R50.9 COUGH WITH FEVER: ICD-10-CM

## 2022-05-01 DIAGNOSIS — R94.31 ABNORMAL Q WAVES ON ELECTROCARDIOGRAM: ICD-10-CM

## 2022-05-01 DIAGNOSIS — S06.0X1A CONCUSSION WITH LOSS OF CONSCIOUSNESS OF 30 MINUTES OR LESS, INITIAL ENCOUNTER: ICD-10-CM

## 2022-05-01 DIAGNOSIS — R33.9 URINARY RETENTION: ICD-10-CM

## 2022-05-01 DIAGNOSIS — N41.9 PROSTATITIS, UNSPECIFIED PROSTATITIS TYPE: Primary | ICD-10-CM

## 2022-05-01 DIAGNOSIS — R05.9 COUGH WITH FEVER: ICD-10-CM

## 2022-05-01 LAB
ALBUMIN SERPL BCP-MCNC: 3.9 G/DL (ref 3.5–5)
ALBUMIN/GLOB SERPL: 1 {RATIO}
ALP SERPL-CCNC: 65 U/L (ref 45–115)
ALT SERPL W P-5'-P-CCNC: 25 U/L (ref 16–61)
ANION GAP SERPL CALCULATED.3IONS-SCNC: 15 MMOL/L (ref 7–16)
AST SERPL W P-5'-P-CCNC: 20 U/L (ref 15–37)
BACTERIA #/AREA URNS HPF: ABNORMAL /HPF
BASOPHILS # BLD AUTO: 0.03 K/UL (ref 0–0.2)
BASOPHILS NFR BLD AUTO: 0.3 % (ref 0–1)
BILIRUB SERPL-MCNC: 1.1 MG/DL (ref 0–1.2)
BILIRUB UR QL STRIP: ABNORMAL
BUN SERPL-MCNC: 8 MG/DL (ref 7–18)
BUN/CREAT SERPL: 8 (ref 6–20)
CALCIUM SERPL-MCNC: 9.6 MG/DL (ref 8.5–10.1)
CHLORIDE SERPL-SCNC: 103 MMOL/L (ref 98–107)
CLARITY UR: ABNORMAL
CO2 SERPL-SCNC: 25 MMOL/L (ref 21–32)
COLOR UR: ABNORMAL
CREAT SERPL-MCNC: 0.96 MG/DL (ref 0.7–1.3)
DIFFERENTIAL METHOD BLD: ABNORMAL
EOSINOPHIL # BLD AUTO: 0.1 K/UL (ref 0–0.5)
EOSINOPHIL NFR BLD AUTO: 1 % (ref 1–4)
ERYTHROCYTE [DISTWIDTH] IN BLOOD BY AUTOMATED COUNT: 12.4 % (ref 11.5–14.5)
FLUAV AG UPPER RESP QL IA.RAPID: NEGATIVE
FLUBV AG UPPER RESP QL IA.RAPID: NEGATIVE
GLOBULIN SER-MCNC: 3.8 G/DL (ref 2–4)
GLUCOSE SERPL-MCNC: 100 MG/DL (ref 74–106)
GLUCOSE UR STRIP-MCNC: NEGATIVE MG/DL
HCT VFR BLD AUTO: 44 % (ref 40–54)
HGB BLD-MCNC: 15.6 G/DL (ref 13.5–18)
IMM GRANULOCYTES # BLD AUTO: 0.05 K/UL (ref 0–0.04)
IMM GRANULOCYTES NFR BLD: 0.5 % (ref 0–0.4)
KETONES UR STRIP-SCNC: NEGATIVE MG/DL
LEUKOCYTE ESTERASE UR QL STRIP: NEGATIVE
LYMPHOCYTES # BLD AUTO: 1.23 K/UL (ref 1–4.8)
LYMPHOCYTES NFR BLD AUTO: 12.8 % (ref 27–41)
MCH RBC QN AUTO: 31.7 PG (ref 27–31)
MCHC RBC AUTO-ENTMCNC: 35.5 G/DL (ref 32–36)
MCV RBC AUTO: 89.4 FL (ref 80–96)
MONOCYTES # BLD AUTO: 0.85 K/UL (ref 0–0.8)
MONOCYTES NFR BLD AUTO: 8.8 % (ref 2–6)
MPC BLD CALC-MCNC: 9.3 FL (ref 9.4–12.4)
MUCOUS THREADS #/AREA URNS HPF: ABNORMAL /HPF
NEUTROPHILS # BLD AUTO: 7.38 K/UL (ref 1.8–7.7)
NEUTROPHILS NFR BLD AUTO: 76.6 % (ref 53–65)
NITRITE UR QL STRIP: NEGATIVE
NRBC # BLD AUTO: 0 X10E3/UL
NRBC, AUTO (.00): 0 %
PH UR STRIP: 6.5 PH UNITS
PLATELET # BLD AUTO: 186 K/UL (ref 150–400)
POTASSIUM SERPL-SCNC: 3.8 MMOL/L (ref 3.5–5.1)
PROT SERPL-MCNC: 7.7 G/DL (ref 6.4–8.2)
PROT UR QL STRIP: ABNORMAL
RBC # BLD AUTO: 4.92 M/UL (ref 4.6–6.2)
RBC # UR STRIP: ABNORMAL /UL
RBC #/AREA URNS HPF: ABNORMAL /HPF
SARS-COV+SARS-COV-2 AG RESP QL IA.RAPID: NEGATIVE
SODIUM SERPL-SCNC: 139 MMOL/L (ref 136–145)
SP GR UR STRIP: 1.01
SQUAMOUS #/AREA URNS LPF: ABNORMAL /LPF
TROPONIN I SERPL HS-MCNC: 30.8 PG/ML
UROBILINOGEN UR STRIP-ACNC: 2 MG/DL
WBC # BLD AUTO: 9.64 K/UL (ref 4.5–11)
WBC #/AREA URNS HPF: ABNORMAL /HPF

## 2022-05-01 PROCEDURE — 80053 COMPREHEN METABOLIC PANEL: CPT | Performed by: EMERGENCY MEDICINE

## 2022-05-01 PROCEDURE — 96376 TX/PRO/DX INJ SAME DRUG ADON: CPT

## 2022-05-01 PROCEDURE — 81001 URINALYSIS AUTO W/SCOPE: CPT | Performed by: EMERGENCY MEDICINE

## 2022-05-01 PROCEDURE — 99285 EMERGENCY DEPT VISIT HI MDM: CPT | Mod: ,,, | Performed by: EMERGENCY MEDICINE

## 2022-05-01 PROCEDURE — 87040 BLOOD CULTURE FOR BACTERIA: CPT | Performed by: EMERGENCY MEDICINE

## 2022-05-01 PROCEDURE — 87086 URINE CULTURE/COLONY COUNT: CPT | Performed by: EMERGENCY MEDICINE

## 2022-05-01 PROCEDURE — 85025 COMPLETE CBC W/AUTO DIFF WBC: CPT | Performed by: EMERGENCY MEDICINE

## 2022-05-01 PROCEDURE — 93005 ELECTROCARDIOGRAM TRACING: CPT

## 2022-05-01 PROCEDURE — 99285 PR EMERGENCY DEPT VISIT,LEVEL V: ICD-10-PCS | Mod: ,,, | Performed by: EMERGENCY MEDICINE

## 2022-05-01 PROCEDURE — 51798 US URINE CAPACITY MEASURE: CPT

## 2022-05-01 PROCEDURE — 25000003 PHARM REV CODE 250: Performed by: EMERGENCY MEDICINE

## 2022-05-01 PROCEDURE — 93010 ELECTROCARDIOGRAM REPORT: CPT | Mod: ,,, | Performed by: INTERNAL MEDICINE

## 2022-05-01 PROCEDURE — 87428 SARSCOV & INF VIR A&B AG IA: CPT | Performed by: EMERGENCY MEDICINE

## 2022-05-01 PROCEDURE — 25500020 PHARM REV CODE 255: Performed by: EMERGENCY MEDICINE

## 2022-05-01 PROCEDURE — 36415 COLL VENOUS BLD VENIPUNCTURE: CPT | Performed by: EMERGENCY MEDICINE

## 2022-05-01 PROCEDURE — 99285 EMERGENCY DEPT VISIT HI MDM: CPT | Mod: 25

## 2022-05-01 PROCEDURE — 63600175 PHARM REV CODE 636 W HCPCS: Performed by: EMERGENCY MEDICINE

## 2022-05-01 PROCEDURE — 96375 TX/PRO/DX INJ NEW DRUG ADDON: CPT

## 2022-05-01 PROCEDURE — 84484 ASSAY OF TROPONIN QUANT: CPT | Performed by: EMERGENCY MEDICINE

## 2022-05-01 PROCEDURE — 96361 HYDRATE IV INFUSION ADD-ON: CPT

## 2022-05-01 PROCEDURE — 93010 EKG 12-LEAD: ICD-10-PCS | Mod: ,,, | Performed by: INTERNAL MEDICINE

## 2022-05-01 PROCEDURE — 96365 THER/PROPH/DIAG IV INF INIT: CPT

## 2022-05-01 RX ORDER — ONDANSETRON 4 MG/1
4 TABLET, ORALLY DISINTEGRATING ORAL EVERY 6 HOURS PRN
Qty: 20 TABLET | Refills: 0 | Status: SHIPPED | OUTPATIENT
Start: 2022-05-01 | End: 2022-06-20

## 2022-05-01 RX ORDER — PHENAZOPYRIDINE HYDROCHLORIDE 200 MG/1
200 TABLET, FILM COATED ORAL 3 TIMES DAILY
Qty: 15 TABLET | Refills: 0 | Status: SHIPPED | OUTPATIENT
Start: 2022-05-01 | End: 2022-05-11

## 2022-05-01 RX ORDER — HYDROMORPHONE HYDROCHLORIDE 2 MG/ML
1 INJECTION, SOLUTION INTRAMUSCULAR; INTRAVENOUS; SUBCUTANEOUS
Status: COMPLETED | OUTPATIENT
Start: 2022-05-01 | End: 2022-05-01

## 2022-05-01 RX ORDER — SULFAMETHOXAZOLE AND TRIMETHOPRIM 800; 160 MG/1; MG/1
1 TABLET ORAL 2 TIMES DAILY
Qty: 90 TABLET | Refills: 0 | Status: SHIPPED | OUTPATIENT
Start: 2022-05-01 | End: 2022-05-05

## 2022-05-01 RX ORDER — PROMETHAZINE HYDROCHLORIDE 25 MG/1
25 TABLET ORAL EVERY 6 HOURS PRN
Qty: 20 TABLET | Refills: 0 | Status: SHIPPED | OUTPATIENT
Start: 2022-05-01 | End: 2022-06-20

## 2022-05-01 RX ORDER — ONDANSETRON 2 MG/ML
4 INJECTION INTRAMUSCULAR; INTRAVENOUS
Status: COMPLETED | OUTPATIENT
Start: 2022-05-01 | End: 2022-05-01

## 2022-05-01 RX ORDER — ALFUZOSIN HYDROCHLORIDE 10 MG/1
10 TABLET, EXTENDED RELEASE ORAL NIGHTLY
Qty: 30 TABLET | Refills: 1 | Status: SHIPPED | OUTPATIENT
Start: 2022-05-01 | End: 2022-08-01

## 2022-05-01 RX ORDER — HYDROCODONE BITARTRATE AND ACETAMINOPHEN 10; 325 MG/1; MG/1
1 TABLET ORAL EVERY 6 HOURS PRN
Qty: 12 TABLET | Refills: 0 | Status: SHIPPED | OUTPATIENT
Start: 2022-05-01 | End: 2022-06-20

## 2022-05-01 RX ADMIN — SODIUM CHLORIDE 1000 ML: 9 INJECTION, SOLUTION INTRAVENOUS at 01:05

## 2022-05-01 RX ADMIN — HYDROMORPHONE HYDROCHLORIDE 1 MG: 2 INJECTION, SOLUTION INTRAMUSCULAR; INTRAVENOUS; SUBCUTANEOUS at 02:05

## 2022-05-01 RX ADMIN — PROMETHAZINE HYDROCHLORIDE 12.5 MG: 25 INJECTION INTRAMUSCULAR; INTRAVENOUS at 01:05

## 2022-05-01 RX ADMIN — IOPAMIDOL 100 ML: 755 INJECTION, SOLUTION INTRAVENOUS at 02:05

## 2022-05-01 RX ADMIN — HYDROMORPHONE HYDROCHLORIDE 1 MG: 2 INJECTION, SOLUTION INTRAMUSCULAR; INTRAVENOUS; SUBCUTANEOUS at 03:05

## 2022-05-01 RX ADMIN — ONDANSETRON 4 MG: 2 INJECTION INTRAMUSCULAR; INTRAVENOUS at 02:05

## 2022-05-01 NOTE — ED TRIAGE NOTES
Pt presents to the ed after he had a syncopal episode and fell in the shower.    -Hgb 9.8, down from 12-13 in previous admission in March  -no signs or symptoms of bleeding  -ACD vs iron deficiency vs other    Plan:  -monitor for bleeding given starting heparin drip for acute DVT  -trend CBC  -iron studies, B12, folate -Hgb 9.8, down from 12-13 in previous admission in March  -no signs or symptoms of bleeding  -ACD vs iron deficiency vs other    Plan:  -monitor for bleeding given starting heparin drip for acute DVT  -trend CBC Q12  -iron studies, B12, folate

## 2022-05-01 NOTE — DISCHARGE INSTRUCTIONS
Use ibuprofen and Tylenol as needed.  Drink plenty of fluids.  Stand up and sit up slowly.    Follow-up with primary care and with urologist.  Also follow-up with cardiologist.    Stop taking Flomax.

## 2022-05-01 NOTE — ED PROVIDER NOTES
Encounter Date: 5/1/2022       History     Chief Complaint   Patient presents with    Loss of Consciousness     Pt passed out in shower  today and also c/o right flank pain x 2 days     Patient complains of headache and vomiting after falling and striking his head today.  Also is complaining of 2 day history of right flank pain and dysuria as well as 1 day history of fever.  Patient's symptoms 1st started 4 days ago when he was riding a 4 monteiro which she says that sometimes induces prostatitis.  Later that evening he had some mild dysuria and soreness in the perennial area.  The next day patient had some worsened dysuria.  Two days ago he developed some moderate to severe pain to the right flank which was intermittent and has improved over the past day.  He has had some mild dysuria.  Patient has acute history of kidney stones that have passed spontaneously without intervention.  Patient has been treated with 1 day of Bactrim.  Also yesterday he received 2 L normal saline and Rocephin IV.  Patient was also started on Flomax yesterday which is a new drug for him.  Patient had fever yesterday evening and today.  Fever resolved after taking Tylenol today.  Today he passed out in the shower.  After having started Flomax patient was bending over the shower stood up abruptly causing a have a complete syncopal episode striking his forehead on the shower floor and thinks he may have been unconscious for a couple of minutes.  When he woke up he vomited several times.  Since the vomiting he has had several episodes of coughing and tussive emesis.  Otherwise no shortness of breath or chest pain.  No prior history of DVT.  No prior history of exertional chest pain.  No prior cardiac pathology.  No other associated symptoms or modifying factors        Review of patient's allergies indicates:  No Known Allergies  Past Medical History:   Diagnosis Date    Achilles tendinitis     Allergic rhinitis     Anxiety disorder      COVID-19 08/2021    x 2 (02/2021)    Depressive disorder     Recurrent genital herpes simplex type 2 infection     Taking multiple medications for chronic disease      Past Surgical History:   Procedure Laterality Date    KNEE ARTHROSCOPY Left 2000     Family History   Problem Relation Age of Onset    Diabetes Mother     Diabetes Father     Lung cancer Father     No Known Problems Sister     No Known Problems Brother      Social History     Tobacco Use    Smoking status: Never Smoker    Smokeless tobacco: Never Used   Substance Use Topics    Alcohol use: Yes     Comment: 2-3 drinks per month    Drug use: Never     Review of Systems   Constitutional: Positive for chills and fever.   HENT: Negative for congestion and sore throat.    Respiratory: Positive for cough. Negative for shortness of breath.    Cardiovascular: Negative for chest pain, palpitations and leg swelling.   Gastrointestinal: Positive for vomiting. Negative for nausea. Abdominal pain: Right flank pain.   Genitourinary: Positive for dysuria, flank pain and urgency. Negative for hematuria.   Musculoskeletal: Negative for back pain and neck pain.   Skin: Negative for rash.   Neurological: Positive for light-headedness. Negative for speech difficulty and numbness.   Hematological: Does not bruise/bleed easily.   Psychiatric/Behavioral: Negative for agitation, behavioral problems, confusion and decreased concentration.       Physical Exam     Initial Vitals [05/01/22 1232]   BP Pulse Resp Temp SpO2   (!) 155/88 100 16 98.1 °F (36.7 °C) 96 %      MAP       --         Physical Exam    Constitutional: He appears well-developed and well-nourished.   HENT:   Head: Normocephalic and atraumatic.   Patient has abrasion on the forehead with proximally 3 cm shallow hematoma.   Eyes: EOM are normal. Pupils are equal, round, and reactive to light.   Neck:   Normal range of motion.  Cardiovascular: Normal rate and regular rhythm.   Pulmonary/Chest: Breath  sounds normal.   Abdominal: Abdomen is soft.   Musculoskeletal:         General: Normal range of motion.      Cervical back: Normal range of motion.     Neurological: He is alert and oriented to person, place, and time. GCS score is 15. GCS eye subscore is 4. GCS verbal subscore is 5. GCS motor subscore is 6.   Skin: Skin is warm and dry.   Psychiatric: He has a normal mood and affect. Thought content normal.         Medical Screening Exam   See Full Note    ED Course   Procedures  Labs Reviewed   URINALYSIS, REFLEX TO URINE CULTURE - Abnormal; Notable for the following components:       Result Value    Color, UA Orange (*)     Clarity, UA Other (*)     Protein, UA Trace (*)     Urobilinogen, UA 2.0 (*)     Bilirubin, UA Small (*)     Blood, UA Moderate (*)     All other components within normal limits   CBC WITH DIFFERENTIAL - Abnormal; Notable for the following components:    MCH 31.7 (*)     MPV 9.3 (*)     Neutrophils % 76.6 (*)     Lymphocytes % 12.8 (*)     Monocytes % 8.8 (*)     Immature Granulocytes % 0.5 (*)     Monocytes, Absolute 0.85 (*)     Immature Granulocytes, Absolute 0.05 (*)     All other components within normal limits   URINALYSIS, MICROSCOPIC - Abnormal; Notable for the following components:    RBC, UA Too Numerous To Count (*)     Squamous Epithelial Cells, UA Occasional (*)     Mucus, UA Rare (*)     All other components within normal limits   SARS-COV2 (COVID) W/ FLU ANTIGEN - Normal    Narrative:     Negative SARS-CoV results should not be used as the sole basis for treatment or patient management decisions; negative results should be considered in the context of a patient's recent exposures, history and the presene of clinical signs and symptoms consistent with COVID-19.  Negative results should be treated as presumptive and confirmed by molecular assay, if necessary for patient management.   TROPONIN I - Normal   CULTURE, URINE   CULTURE, BLOOD   CULTURE, BLOOD   CBC W/ AUTO DIFFERENTIAL     Narrative:     The following orders were created for panel order CBC auto differential.  Procedure                               Abnormality         Status                     ---------                               -----------         ------                     CBC with Differential[[id number]]        Abnormal            Final result                 Please view results for these tests on the individual orders.   COMPREHENSIVE METABOLIC PANEL   EXTRA TUBES    Narrative:     The following orders were created for panel order EXTRA TUBES.  Procedure                               Abnormality         Status                     ---------                               -----------         ------                     Light Blue Top Hold[[id number]]                              In process                 Light Green Top Hold[[id number]]                             In process                 Lavender Top Hold[[id number]]                                In process                 Pink Top Hold[[id number]]                                    In process                   Please view results for these tests on the individual orders.   LIGHT BLUE TOP HOLD   LIGHT GREEN TOP HOLD   LAVENDER TOP HOLD   PINK TOP HOLD          Imaging Results          X-Ray Chest PA And Lateral (Final result)  Result time 05/01/22 14:50:49    Final result by Brendan Pride II, MD (05/01/22 14:50:49)                 Impression:      No evidence of cardiopulmonary disease.      Electronically signed by: Brendan Pride  Date:    05/01/2022  Time:    14:50             Narrative:    EXAMINATION:  XR CHEST PA AND LATERAL    CLINICAL HISTORY:  Cough, unspecified    COMPARISON:  None available    FINDINGS:  The heart and mediastinum are normal in size and configuration.  The pulmonary vascularity is normal in caliber.  No lung infiltrates, effusions, pneumothorax or other abnormality is demonstrated.                               CT Abdomen Pelvis W Wo Contrast  (Final result)  Result time 05/01/22 14:50:30    Final result by Brendan Pride II, MD (05/01/22 14:50:30)                 Impression:      No evidence of abnormality demonstrated.      Electronically signed by: Brendan Pride  Date:    05/01/2022  Time:    14:50             Narrative:    EXAMINATION:  CT ABDOMEN PELVIS W WO CONTRAST    CLINICAL HISTORY:  flank plain;    TECHNIQUE:  Axial CT imaging of the abdomen and pelvis is performed without and with intravenous contrast.  Contrast dose is 100 cc Isovue 370.    CT dose reduction technique used - Dose Rite and tube current modulation.    COMPARISON:  None available    FINDINGS:  Cardiac and lung bases are within normal limits    CT abdomen: The liver, spleen, pancreas and adrenal glands are normal in size and enhancement.  No evidence of focal lesion is demonstrated in these solid organs.    Simple appearing cyst present on the left kidney measures 3.1 x 2.67 cm.  Otherwise kidneys are normal in size and enhancement.  No evidence of hydronephrosis or nephrolithiasis is seen.    The bowel caliber is normal and no wall thickening or adjacent inflammatory change is seen.  No evidence of free fluid or free air is present.  Appendix is not seen.    CT pelvis: Few diverticula present the colon, no findings of diverticulitis.  Otherwise the pelvic bowel appears within normal limits.  Bladder shows no evidence of abnormality.  The pelvic organs show no evidence of abnormality.                               CT Head Without Contrast (Final result)  Result time 05/01/22 14:41:35    Final result by Brendan Pride II, MD (05/01/22 14:41:35)                 Impression:      No evidence of abnormality demonstrated.      Electronically signed by: Brendan Pride  Date:    05/01/2022  Time:    14:41             Narrative:    EXAMINATION:  CT HEAD WITHOUT CONTRAST    CLINICAL HISTORY:  fall, head injury;    TECHNIQUE:  Axial CT imaging of the brain is performed without  contrast with 3 mm increments.    CT dose reduction technique used - Dose Rite and tube current modulation.    COMPARISON:  None available    FINDINGS:  No evidence of hemorrhage, mass, mass effect, midline shift or acute infarct seen.  The brain parenchyma attenuation and differentiation appears within normal limits. The ventricles and cisterns are normal in caliber.  No cranial or skull base abnormality is identified.                                 Medications   sodium chloride 0.9% bolus 1,000 mL (0 mLs Intravenous Stopped 5/1/22 1425)   promethazine (PHENERGAN) 12.5 mg in dextrose 5 % 50 mL IVPB (0 mg Intravenous Stopped 5/1/22 1345)   HYDROmorphone (PF) injection 1 mg (1 mg Intravenous Given 5/1/22 1412)   ondansetron injection 4 mg (4 mg Intravenous Given 5/1/22 1413)   iopamidoL (ISOVUE-370) injection 100 mL (100 mLs Intravenous Given 5/1/22 1437)   HYDROmorphone (PF) injection 1 mg (1 mg Intravenous Given 5/1/22 1544)                 ED Course as of 05/01/22 1704   Sun May 01, 2022   1640 Case discussed with Dr. Mustafa.  Agrees with management.  Will discontinue Flomax and and alfuzosin at night.  Patient is retaining some urine approximately 300 mL on bladder scan.  Will be treated with Bactrim for 6 weeks.  Also Phenergan peridium and hydrocodone for symptomatic relief. [PK]   1641 Patient has some subtle Q-waves on his EKG no prior known cardiac history.  No chest pain or shortness of breath that is exertional.  EKG reviewed by cardiologist on-call.  Will recommend ambulatory referral to cardiologist for further nonemergent evaluation. [PK]      ED Course User Index  [PK] Jacobo Godinez MD          Clinical Impression:   Final diagnoses:  [R05.9, R50.9] Cough with fever  [R55] Syncope  [N41.9] Prostatitis, unspecified prostatitis type (Primary)  [S06.0X1A] Concussion with loss of consciousness of 30 minutes or less, initial encounter  [R94.31] Abnormal Q waves on electrocardiogram  [R33.9] Urinary  retention          ED Disposition Condition    Discharge Stable        ED Prescriptions     Medication Sig Dispense Start Date End Date Auth. Provider    promethazine (PHENERGAN) 25 MG tablet Take 1 tablet (25 mg total) by mouth every 6 (six) hours as needed for Nausea. 20 tablet 5/1/2022  Jacobo Godinez MD    HYDROcodone-acetaminophen (NORCO)  mg per tablet Take 1 tablet by mouth every 6 (six) hours as needed for Pain. 12 tablet 5/1/2022  Jacobo Godinez MD    phenazopyridine (PYRIDIUM) 200 MG tablet Take 1 tablet (200 mg total) by mouth 3 (three) times daily. for 10 days 15 tablet 5/1/2022 5/11/2022 Jacobo Godinez MD    sulfamethoxazole-trimethoprim 800-160mg (BACTRIM DS) 800-160 mg Tab Take 1 tablet by mouth 2 (two) times daily. for 7 days 90 tablet 5/1/2022 5/8/2022 Jacobo Godinez MD    ondansetron (ZOFRAN-ODT) 4 MG TbDL Take 1 tablet (4 mg total) by mouth every 6 (six) hours as needed (nausea). 20 tablet 5/1/2022  Jacobo Godinez MD    alfuzosin (UROXATRAL) 10 mg Tb24 Take 1 tablet (10 mg total) by mouth every evening. 30 tablet 5/1/2022 5/1/2023 Jacobo Godinez MD        Follow-up Information     Follow up With Specialties Details Why Contact Info    Mateo Wilkinson,  Family Medicine Schedule an appointment as soon as possible for a visit   [address]  [facility] [id number]  [phone]      Austin Bradley MD Interventional Cardiology, Cardiology Schedule an appointment as soon as possible for a visit   [address] [place] [id number]  [phone]      Candido Dawn Jr., MD Urology Schedule an appointment as soon as possible for a visit   [address] [place] [address]  [phone]             Jacobo Godinez MD  05/01/22 3497

## 2022-05-02 ENCOUNTER — OFFICE VISIT (OUTPATIENT)
Dept: FAMILY MEDICINE | Facility: CLINIC | Age: 42
End: 2022-05-02
Payer: COMMERCIAL

## 2022-05-02 ENCOUNTER — TELEPHONE (OUTPATIENT)
Dept: EMERGENCY MEDICINE | Facility: HOSPITAL | Age: 42
End: 2022-05-02
Payer: COMMERCIAL

## 2022-05-02 VITALS
RESPIRATION RATE: 18 BRPM | DIASTOLIC BLOOD PRESSURE: 72 MMHG | HEIGHT: 72 IN | BODY MASS INDEX: 28.66 KG/M2 | SYSTOLIC BLOOD PRESSURE: 120 MMHG | HEART RATE: 81 BPM | TEMPERATURE: 98 F | OXYGEN SATURATION: 97 % | WEIGHT: 211.63 LBS

## 2022-05-02 DIAGNOSIS — B00.9 HERPES: ICD-10-CM

## 2022-05-02 DIAGNOSIS — R07.89 OTHER CHEST PAIN: ICD-10-CM

## 2022-05-02 DIAGNOSIS — F90.9 ATTENTION DEFICIT HYPERACTIVITY DISORDER (ADHD), UNSPECIFIED ADHD TYPE: ICD-10-CM

## 2022-05-02 DIAGNOSIS — G47.33 OSA (OBSTRUCTIVE SLEEP APNEA): Primary | ICD-10-CM

## 2022-05-02 DIAGNOSIS — K21.00 GASTROESOPHAGEAL REFLUX DISEASE WITH ESOPHAGITIS WITHOUT HEMORRHAGE: ICD-10-CM

## 2022-05-02 PROCEDURE — 3008F BODY MASS INDEX DOCD: CPT | Mod: ,,, | Performed by: FAMILY MEDICINE

## 2022-05-02 PROCEDURE — 3074F SYST BP LT 130 MM HG: CPT | Mod: ,,, | Performed by: FAMILY MEDICINE

## 2022-05-02 PROCEDURE — 1159F PR MEDICATION LIST DOCUMENTED IN MEDICAL RECORD: ICD-10-PCS | Mod: ,,, | Performed by: FAMILY MEDICINE

## 2022-05-02 PROCEDURE — 1160F PR REVIEW ALL MEDS BY PRESCRIBER/CLIN PHARMACIST DOCUMENTED: ICD-10-PCS | Mod: ,,, | Performed by: FAMILY MEDICINE

## 2022-05-02 PROCEDURE — 93000 PR ELECTROCARDIOGRAM, COMPLETE: ICD-10-PCS | Mod: ,,, | Performed by: FAMILY MEDICINE

## 2022-05-02 PROCEDURE — 99215 OFFICE O/P EST HI 40 MIN: CPT | Mod: ,,, | Performed by: FAMILY MEDICINE

## 2022-05-02 PROCEDURE — 99215 PR OFFICE/OUTPT VISIT, EST, LEVL V, 40-54 MIN: ICD-10-PCS | Mod: ,,, | Performed by: FAMILY MEDICINE

## 2022-05-02 PROCEDURE — 1160F RVW MEDS BY RX/DR IN RCRD: CPT | Mod: ,,, | Performed by: FAMILY MEDICINE

## 2022-05-02 PROCEDURE — 3078F PR MOST RECENT DIASTOLIC BLOOD PRESSURE < 80 MM HG: ICD-10-PCS | Mod: ,,, | Performed by: FAMILY MEDICINE

## 2022-05-02 PROCEDURE — 1159F MED LIST DOCD IN RCRD: CPT | Mod: ,,, | Performed by: FAMILY MEDICINE

## 2022-05-02 PROCEDURE — 3074F PR MOST RECENT SYSTOLIC BLOOD PRESSURE < 130 MM HG: ICD-10-PCS | Mod: ,,, | Performed by: FAMILY MEDICINE

## 2022-05-02 PROCEDURE — 3078F DIAST BP <80 MM HG: CPT | Mod: ,,, | Performed by: FAMILY MEDICINE

## 2022-05-02 PROCEDURE — 3008F PR BODY MASS INDEX (BMI) DOCUMENTED: ICD-10-PCS | Mod: ,,, | Performed by: FAMILY MEDICINE

## 2022-05-02 PROCEDURE — 93000 ELECTROCARDIOGRAM COMPLETE: CPT | Mod: ,,, | Performed by: FAMILY MEDICINE

## 2022-05-02 PROCEDURE — 3044F PR MOST RECENT HEMOGLOBIN A1C LEVEL <7.0%: ICD-10-PCS | Mod: ,,, | Performed by: FAMILY MEDICINE

## 2022-05-02 PROCEDURE — 3044F HG A1C LEVEL LT 7.0%: CPT | Mod: ,,, | Performed by: FAMILY MEDICINE

## 2022-05-02 RX ORDER — PANTOPRAZOLE SODIUM 40 MG/1
40 TABLET, DELAYED RELEASE ORAL 2 TIMES DAILY
Qty: 60 TABLET | Refills: 5 | Status: SHIPPED | OUTPATIENT
Start: 2022-05-02 | End: 2022-08-01

## 2022-05-02 RX ORDER — SULFAMETHOXAZOLE AND TRIMETHOPRIM 800; 160 MG/1; MG/1
1 TABLET ORAL 2 TIMES DAILY
Refills: 0 | Status: CANCELLED | OUTPATIENT
Start: 2022-05-02 | End: 2022-05-09

## 2022-05-02 RX ORDER — DEXTROAMPHETAMINE SACCHARATE, AMPHETAMINE ASPARTATE, DEXTROAMPHETAMINE SULFATE AND AMPHETAMINE SULFATE 2.5; 2.5; 2.5; 2.5 MG/1; MG/1; MG/1; MG/1
1 TABLET ORAL 2 TIMES DAILY
Qty: 60 TABLET | Refills: 0 | Status: SHIPPED | OUTPATIENT
Start: 2022-05-02 | End: 2022-05-02 | Stop reason: SDUPTHER

## 2022-05-02 RX ORDER — PANTOPRAZOLE SODIUM 40 MG/1
40 TABLET, DELAYED RELEASE ORAL 2 TIMES DAILY
Qty: 60 TABLET | Refills: 5 | Status: SHIPPED | OUTPATIENT
Start: 2022-05-02 | End: 2022-05-02 | Stop reason: SDUPTHER

## 2022-05-02 RX ORDER — VALACYCLOVIR HYDROCHLORIDE 500 MG/1
500 TABLET, FILM COATED ORAL 2 TIMES DAILY
Qty: 60 TABLET | Refills: 5 | Status: SHIPPED | OUTPATIENT
Start: 2022-05-02 | End: 2022-08-01 | Stop reason: SDUPTHER

## 2022-05-02 RX ORDER — VALACYCLOVIR HYDROCHLORIDE 500 MG/1
500 TABLET, FILM COATED ORAL 2 TIMES DAILY
Qty: 60 TABLET | Refills: 5 | Status: SHIPPED | OUTPATIENT
Start: 2022-05-02 | End: 2022-05-02 | Stop reason: SDUPTHER

## 2022-05-02 RX ORDER — DEXTROAMPHETAMINE SACCHARATE, AMPHETAMINE ASPARTATE, DEXTROAMPHETAMINE SULFATE AND AMPHETAMINE SULFATE 2.5; 2.5; 2.5; 2.5 MG/1; MG/1; MG/1; MG/1
1 TABLET ORAL 2 TIMES DAILY
Qty: 60 TABLET | Refills: 0 | Status: SHIPPED | OUTPATIENT
Start: 2022-05-02 | End: 2022-08-01 | Stop reason: SDUPTHER

## 2022-05-02 NOTE — ASSESSMENT & PLAN NOTE
Patient has a history of obstructive sleep apnea but never received his equipment to be treated.  He needs to have a repeat the test done.  Referral to sleep apnea at [facility].  Part of his syncopal episodes and/or low blood pressure may be related to his obstructive sleep apnea.

## 2022-05-02 NOTE — PROGRESS NOTES
Mateo Wilkinson DO   [address] [place]  [address]      PATIENT NAME: Hemanth Cottrell  : 1980  DATE: 22  MRN: [id number]      Billing Provider: Mateo Wilkinson DO  Level of Service:   Patient PCP Information     Provider PCP Type    Mateo Wilkinson DO General          Reason for Visit / Chief Complaint: Follow-up (ER follow up 2022 @ [facility]. Pt states dr. Godinez said he will need to be on a six week antibiotic for prostatitis) and Referral (Pt need sleep study. )       Update PCP  Update Chief Complaint         History of Present Illness / Problem Focused Workflow     Hemanth Cottrell presents to the clinic with Follow-up (ER follow up 2022 @ [facility]. Pt states dr. Godinez said he will need to be on a six week antibiotic for prostatitis) and Referral (Pt need sleep study. )     Patient reports having some abdominal pain with history of kidney stones and being seen in the emergency room after he also had a syncopal type episode in his bathroom.  Patient reports pain in his flanks as well as his back and his the suprapubic and penile area.  He denies any vomiting but did have nausea.  Patient also reported some tightness in his chest.  He has a history of sleep apnea also.  Patient also has a history of ADHD and takes the Adderall for that condition.  Patient today reports some pain in her right side of his chest but he also had some indigestion heartburn.  He reports that in the ER he was told that he had some abnormal Q-waves in his inferior leads but the patient has no history of coronary artery disease hyperlipidemia hypertension diabetes or any significant family history of coronary disease.      Review of Systems     Review of Systems   Constitutional: Positive for diaphoresis. Negative for activity change, appetite change, chills, fatigue and fever.   HENT: Negative for nasal congestion, ear discharge, ear pain, mouth dryness, mouth sores,  postnasal drip, sinus pressure/congestion, sore throat and voice change.    Eyes: Negative for pain, discharge, redness, itching and visual disturbance.   Respiratory: Positive for shortness of breath. Negative for apnea, cough, chest tightness and wheezing.    Cardiovascular: Positive for chest pain. Negative for palpitations and leg swelling.   Gastrointestinal: Positive for abdominal pain and reflux. Negative for abdominal distention, anal bleeding, blood in stool, change in bowel habit, constipation, diarrhea, nausea, vomiting and change in bowel habit.   Endocrine: Negative for cold intolerance, heat intolerance, polydipsia, polyphagia and polyuria.   Genitourinary: Positive for flank pain and urgency. Negative for difficulty urinating, enuresis, erectile dysfunction, frequency, genital sores and hematuria.   Musculoskeletal: Negative for arthralgias, back pain, gait problem, leg pain, myalgias and neck pain.   Integumentary:  Negative for rash, mole/lesion, breast mass and breast discharge.   Allergic/Immunologic: Negative for environmental allergies and food allergies.   Neurological: Positive for syncope and light-headedness. Negative for dizziness, vertigo, tremors, seizures, facial asymmetry, speech difficulty, weakness, numbness, headaches, disturbances in coordination, memory loss and coordination difficulties.   Hematological: Negative for adenopathy. Does not bruise/bleed easily.   Psychiatric/Behavioral: Negative for agitation, behavioral problems, confusion, decreased concentration, dysphoric mood, hallucinations, self-injury, sleep disturbance and suicidal ideas. The patient is not nervous/anxious and is not hyperactive.    Breast: Negative for mass      Medical / Social / Family History     Past Medical History:   Diagnosis Date    Achilles tendinitis     Allergic rhinitis     Anxiety disorder     COVID-19 08/2021    x 2 (02/2021)    Depressive disorder     Recurrent genital herpes simplex  type 2 infection     Taking multiple medications for chronic disease        Past Surgical History:   Procedure Laterality Date    KNEE ARTHROSCOPY Left 2000       Social History    reports that he has never smoked. He has never used smokeless tobacco. He reports current alcohol use. He reports that he does not use drugs.    Family History  's family history includes Diabetes in his father and mother; Lung cancer in his father; No Known Problems in his brother and sister.    Medications and Allergies     Medications  Outpatient Medications Marked as Taking for the 5/2/22 encounter (Office Visit) with Mateo Wilkinson, DO   Medication Sig Dispense Refill    alfuzosin (UROXATRAL) 10 mg Tb24 Take 1 tablet (10 mg total) by mouth every evening. 30 tablet 1    ergocalciferol (ERGOCALCIFEROL) 50,000 unit Cap Take 1 capsule (50,000 Units total) by mouth every 7 days. 4 capsule 2    HYDROcodone-acetaminophen (NORCO)  mg per tablet Take 1 tablet by mouth every 6 (six) hours as needed for Pain. 12 tablet 0    ondansetron (ZOFRAN-ODT) 4 MG TbDL Take 1 tablet (4 mg total) by mouth every 6 (six) hours as needed (nausea). 20 tablet 0    phenazopyridine (PYRIDIUM) 200 MG tablet Take 1 tablet (200 mg total) by mouth 3 (three) times daily. for 10 days 15 tablet 0    promethazine (PHENERGAN) 25 MG tablet Take 1 tablet (25 mg total) by mouth every 6 (six) hours as needed for Nausea. 20 tablet 0    sulfamethoxazole-trimethoprim 800-160mg (BACTRIM DS) 800-160 mg Tab Take 1 tablet by mouth 2 (two) times daily. for 7 days 90 tablet 0    [DISCONTINUED] dextroamphetamine-amphetamine 10 mg Tab Take 1 tablet (10 mg total) by mouth 2 (two) times daily. 60 tablet 0    [DISCONTINUED] pantoprazole (PROTONIX) 40 MG tablet Take 1 tablet (40 mg total) by mouth 2 (two) times daily. 60 tablet 5    [DISCONTINUED] semaglutide (OZEMPIC) 1 mg/dose (2 mg/1.5 mL) PnIj Inject 1 mg into the skin every 7 days.      [DISCONTINUED]  valACYclovir (VALTREX) 500 MG tablet Take 1 tablet (500 mg total) by mouth 2 (two) times daily. 60 tablet 5       Allergies  Review of patient's allergies indicates:  No Known Allergies    Physical Examination     Vitals:    05/02/22 1601   BP: 120/72   Pulse: 81   Resp: 18   Temp: 98 °F (36.7 °C)     Physical Exam  Constitutional:       General: He is not in acute distress.     Appearance: Normal appearance. He is normal weight.   HENT:      Head: Normocephalic.      Nose: Nose normal. No congestion.      Mouth/Throat:      Mouth: Mucous membranes are moist.      Pharynx: Oropharynx is clear. No oropharyngeal exudate or posterior oropharyngeal erythema.   Eyes:      General: No scleral icterus.        Right eye: No discharge.         Left eye: No discharge.      Conjunctiva/sclera: Conjunctivae normal.      Pupils: Pupils are equal, round, and reactive to light.   Neck:      Vascular: No carotid bruit.   Cardiovascular:      Rate and Rhythm: Normal rate and regular rhythm.      Pulses: Normal pulses.      Heart sounds: Normal heart sounds. No murmur heard.  Pulmonary:      Effort: Pulmonary effort is normal. No respiratory distress.      Breath sounds: Normal breath sounds. No wheezing.   Chest:      Chest wall: No tenderness.   Abdominal:      General: Abdomen is flat. Bowel sounds are normal. There is no distension.      Tenderness: There is abdominal tenderness. There is right CVA tenderness. There is no left CVA tenderness.   Musculoskeletal:         General: Normal range of motion.      Cervical back: Normal range of motion and neck supple. No rigidity or tenderness.      Right lower leg: No edema.      Left lower leg: No edema.   Lymphadenopathy:      Cervical: No cervical adenopathy.   Skin:     General: Skin is warm.      Capillary Refill: Capillary refill takes less than 2 seconds.      Findings: No rash.   Neurological:      General: No focal deficit present.      Mental Status: He is alert and oriented  to person, place, and time. Mental status is at baseline.      Cranial Nerves: No cranial nerve deficit.      Sensory: No sensory deficit.      Motor: No weakness.      Coordination: Coordination normal.      Gait: Gait normal.      Deep Tendon Reflexes: Reflexes normal.   Psychiatric:         Mood and Affect: Mood normal.         Behavior: Behavior normal.         Thought Content: Thought content normal.         Judgment: Judgment normal.               Lab Results   Component Value Date    WBC 9.64 05/01/2022    HGB 15.6 05/01/2022    HCT 44.0 05/01/2022    MCV 89.4 05/01/2022     05/01/2022          Sodium   Date Value Ref Range Status   05/01/2022 139 136 - 145 mmol/L Final     Potassium   Date Value Ref Range Status   05/01/2022 3.8 3.5 - 5.1 mmol/L Final     Chloride   Date Value Ref Range Status   05/01/2022 103 98 - 107 mmol/L Final     CO2   Date Value Ref Range Status   05/01/2022 25 21 - 32 mmol/L Final     Glucose   Date Value Ref Range Status   05/01/2022 100 74 - 106 mg/dL Final     BUN   Date Value Ref Range Status   05/01/2022 8 7 - 18 mg/dL Final     Creatinine   Date Value Ref Range Status   05/01/2022 0.96 0.70 - 1.30 mg/dL Final     Calcium   Date Value Ref Range Status   05/01/2022 9.6 8.5 - 10.1 mg/dL Final     Total Protein   Date Value Ref Range Status   05/01/2022 7.7 6.4 - 8.2 g/dL Final     Albumin   Date Value Ref Range Status   05/01/2022 3.9 3.5 - 5.0 g/dL Final     Bilirubin, Total   Date Value Ref Range Status   05/01/2022 1.1 0.0 - 1.2 mg/dL Final     Alk Phos   Date Value Ref Range Status   05/01/2022 65 45 - 115 U/L Final     AST   Date Value Ref Range Status   05/01/2022 20 15 - 37 U/L Final     ALT   Date Value Ref Range Status   05/01/2022 25 16 - 61 U/L Final     Anion Gap   Date Value Ref Range Status   05/01/2022 15 7 - 16 mmol/L Final     eGFR   Date Value Ref Range Status   05/01/2022 90 >=60 mL/min/1.73m² Final      X-Ray Chest PA And Lateral  Narrative:  EXAMINATION:  XR CHEST PA AND LATERAL    CLINICAL HISTORY:  Cough, unspecified    COMPARISON:  None available    FINDINGS:  The heart and mediastinum are normal in size and configuration.  The pulmonary vascularity is normal in caliber.  No lung infiltrates, effusions, pneumothorax or other abnormality is demonstrated.  Impression: No evidence of cardiopulmonary disease.    Electronically signed by: Brendan Pride  Date:    05/01/2022  Time:    14:50  CT Abdomen Pelvis W Wo Contrast  Narrative: EXAMINATION:  CT ABDOMEN PELVIS W WO CONTRAST    CLINICAL HISTORY:  flank plain;    TECHNIQUE:  Axial CT imaging of the abdomen and pelvis is performed without and with intravenous contrast.  Contrast dose is 100 cc Isovue 370.    CT dose reduction technique used - Dose Rite and tube current modulation.    COMPARISON:  None available    FINDINGS:  Cardiac and lung bases are within normal limits    CT abdomen: The liver, spleen, pancreas and adrenal glands are normal in size and enhancement.  No evidence of focal lesion is demonstrated in these solid organs.    Simple appearing cyst present on the left kidney measures 3.1 x 2.67 cm.  Otherwise kidneys are normal in size and enhancement.  No evidence of hydronephrosis or nephrolithiasis is seen.    The bowel caliber is normal and no wall thickening or adjacent inflammatory change is seen.  No evidence of free fluid or free air is present.  Appendix is not seen.    CT pelvis: Few diverticula present the colon, no findings of diverticulitis.  Otherwise the pelvic bowel appears within normal limits.  Bladder shows no evidence of abnormality.  The pelvic organs show no evidence of abnormality.  Impression: No evidence of abnormality demonstrated.    Electronically signed by: Brendan Pride  Date:    05/01/2022  Time:    14:50  CT Head Without Contrast  Narrative: EXAMINATION:  CT HEAD WITHOUT CONTRAST    CLINICAL HISTORY:  fall, head injury;    TECHNIQUE:  Axial CT imaging of the  brain is performed without contrast with 3 mm increments.    CT dose reduction technique used - Dose Rite and tube current modulation.    COMPARISON:  None available    FINDINGS:  No evidence of hemorrhage, mass, mass effect, midline shift or acute infarct seen.  The brain parenchyma attenuation and differentiation appears within normal limits. The ventricles and cisterns are normal in caliber.  No cranial or skull base abnormality is identified.  Impression: No evidence of abnormality demonstrated.    Electronically signed by: Brendan Pride  Date:    05/01/2022  Time:    14:41     Procedures   Assessment and Plan (including Health Maintenance)      Problem List  Smart Sets  Document Outside HM   :    Plan:         Health Maintenance Due   Topic Date Due    Hepatitis C Screening  Never done    COVID-19 Vaccine (1) Never done    HIV Screening  Never done    TETANUS VACCINE  Never done       Problem List Items Addressed This Visit        Psychiatric    Attention deficit hyperactivity disorder (ADHD)    Current Assessment & Plan     Long history of attention deficit disorder with testing within the last 7 years.  Refilled his Adderall.  Follow-up 3 months.   was pulled and there is no evidence of abuse.           Relevant Medications    dextroamphetamine-amphetamine 10 mg Tab       Cardiac/Vascular    Other chest pain    Current Assessment & Plan     EKG shows normal sinus rhythm today with no acute changes.  Chest x-ray did not show any acute pulmonary or cardiac processes.  Exam today was benign and that I suspicion that he had GERD or reflux is an etiology for his right-sided chest pain.           Relevant Medications    pantoprazole (PROTONIX) 40 MG tablet    Other Relevant Orders    POCT EKG 12-LEAD (NOT FOR [facility] USE)    X-Ray Chest PA And Lateral       ID    Herpes    Overview     Continue current medication           Current Assessment & Plan     Will maintain him on his Valtrex for chronic viral  infection.           Relevant Medications    valACYclovir (VALTREX) 500 MG tablet       GI    Gastroesophageal reflux disease with esophagitis without hemorrhage    Current Assessment & Plan     A patient has GERD and did have some symptoms today that resolved after he burped.  He had right-sided chest pain with a normal chest x-ray and normal EKG.  Will send him for a sleep study is is may have something to bear on his symptom complex.           Relevant Medications    pantoprazole (PROTONIX) 40 MG tablet       Other    ISELA (obstructive sleep apnea) - Primary    Current Assessment & Plan     Patient has a history of obstructive sleep apnea but never received his equipment to be treated.  He needs to have a repeat the test done.  Referral to sleep apnea at [facility].  Part of his syncopal episodes and/or low blood pressure may be related to his obstructive sleep apnea.           Relevant Orders    Ambulatory referral/consult to Sleep Disorders          Health Maintenance Topics with due status: Not Due       Topic Last Completion Date    Influenza Vaccine 11/01/2018    Lipid Panel 02/07/2022       Future Appointments   Date Time Provider Department Center   5/5/2022  1:00 PM JACINTO Rodriges [facility] UROL [facility]   5/31/2022  1:30 PM Austin Bradley MD [facility] CARD [facility]        Follow up in about 4 weeks (around 5/30/2022), or if symptoms worsen or fail to improve.     Signature:  DO Aman Lutz [address] [id number]  [place]  [address]    Date of encounter: 5/2/22

## 2022-05-02 NOTE — ASSESSMENT & PLAN NOTE
A patient has GERD and did have some symptoms today that resolved after he burped.  He had right-sided chest pain with a normal chest x-ray and normal EKG.  Will send him for a sleep study is is may have something to bear on his symptom complex.

## 2022-05-02 NOTE — ASSESSMENT & PLAN NOTE
EKG shows normal sinus rhythm today with no acute changes.  Chest x-ray did not show any acute pulmonary or cardiac processes.  Exam today was benign and that I suspicion that he had GERD or reflux is an etiology for his right-sided chest pain.

## 2022-05-02 NOTE — ASSESSMENT & PLAN NOTE
Long history of attention deficit disorder with testing within the last 7 years.  Refilled his Adderall.  Follow-up 3 months.   was pulled and there is no evidence of abuse.

## 2022-05-03 LAB — UA COMPLETE W REFLEX CULTURE PNL UR: NO GROWTH

## 2022-05-05 ENCOUNTER — OFFICE VISIT (OUTPATIENT)
Dept: UROLOGY | Facility: CLINIC | Age: 42
End: 2022-05-05
Payer: COMMERCIAL

## 2022-05-05 VITALS
WEIGHT: 213 LBS | TEMPERATURE: 98 F | SYSTOLIC BLOOD PRESSURE: 122 MMHG | DIASTOLIC BLOOD PRESSURE: 90 MMHG | HEIGHT: 72 IN | BODY MASS INDEX: 28.85 KG/M2 | HEART RATE: 98 BPM | OXYGEN SATURATION: 98 %

## 2022-05-05 DIAGNOSIS — R33.9 URINARY RETENTION: ICD-10-CM

## 2022-05-05 DIAGNOSIS — N41.9 PROSTATITIS, UNSPECIFIED PROSTATITIS TYPE: Primary | ICD-10-CM

## 2022-05-05 LAB
BACTERIA #/AREA URNS HPF: NORMAL /HPF
BILIRUB UR QL STRIP: NEGATIVE
CLARITY UR: CLEAR
COLOR UR: YELLOW
GLUCOSE UR STRIP-MCNC: NEGATIVE MG/DL
KETONES UR STRIP-SCNC: NEGATIVE MG/DL
LEUKOCYTE ESTERASE UR QL STRIP: ABNORMAL
NITRITE UR QL STRIP: NEGATIVE
PH UR STRIP: 5.5 PH UNITS
PROT UR QL STRIP: NEGATIVE
RBC # UR STRIP: NEGATIVE /UL
RBC #/AREA URNS HPF: NORMAL /HPF
SP GR UR STRIP: 1.01
SQUAMOUS #/AREA URNS LPF: NORMAL /LPF
UROBILINOGEN UR STRIP-ACNC: 0.2 MG/DL
WBC #/AREA URNS HPF: NORMAL /HPF

## 2022-05-05 PROCEDURE — 3080F DIAST BP >= 90 MM HG: CPT | Mod: ,,, | Performed by: NURSE PRACTITIONER

## 2022-05-05 PROCEDURE — 3044F HG A1C LEVEL LT 7.0%: CPT | Mod: ,,, | Performed by: NURSE PRACTITIONER

## 2022-05-05 PROCEDURE — 1159F MED LIST DOCD IN RCRD: CPT | Mod: ,,, | Performed by: NURSE PRACTITIONER

## 2022-05-05 PROCEDURE — 1160F PR REVIEW ALL MEDS BY PRESCRIBER/CLIN PHARMACIST DOCUMENTED: ICD-10-PCS | Mod: ,,, | Performed by: NURSE PRACTITIONER

## 2022-05-05 PROCEDURE — 3044F PR MOST RECENT HEMOGLOBIN A1C LEVEL <7.0%: ICD-10-PCS | Mod: ,,, | Performed by: NURSE PRACTITIONER

## 2022-05-05 PROCEDURE — 3008F PR BODY MASS INDEX (BMI) DOCUMENTED: ICD-10-PCS | Mod: ,,, | Performed by: NURSE PRACTITIONER

## 2022-05-05 PROCEDURE — 99203 PR OFFICE/OUTPT VISIT, NEW, LEVL III, 30-44 MIN: ICD-10-PCS | Mod: S$PBB,,, | Performed by: NURSE PRACTITIONER

## 2022-05-05 PROCEDURE — 1160F RVW MEDS BY RX/DR IN RCRD: CPT | Mod: ,,, | Performed by: NURSE PRACTITIONER

## 2022-05-05 PROCEDURE — 81001 URINALYSIS AUTO W/SCOPE: CPT | Mod: ,,, | Performed by: CLINICAL MEDICAL LABORATORY

## 2022-05-05 PROCEDURE — 3008F BODY MASS INDEX DOCD: CPT | Mod: ,,, | Performed by: NURSE PRACTITIONER

## 2022-05-05 PROCEDURE — 51798 US URINE CAPACITY MEASURE: CPT | Mod: PBBFAC | Performed by: NURSE PRACTITIONER

## 2022-05-05 PROCEDURE — 1159F PR MEDICATION LIST DOCUMENTED IN MEDICAL RECORD: ICD-10-PCS | Mod: ,,, | Performed by: NURSE PRACTITIONER

## 2022-05-05 PROCEDURE — 99214 OFFICE O/P EST MOD 30 MIN: CPT | Mod: PBBFAC | Performed by: NURSE PRACTITIONER

## 2022-05-05 PROCEDURE — 81001 URINALYSIS: ICD-10-PCS | Mod: ,,, | Performed by: CLINICAL MEDICAL LABORATORY

## 2022-05-05 PROCEDURE — 99203 OFFICE O/P NEW LOW 30 MIN: CPT | Mod: S$PBB,,, | Performed by: NURSE PRACTITIONER

## 2022-05-05 PROCEDURE — 3074F PR MOST RECENT SYSTOLIC BLOOD PRESSURE < 130 MM HG: ICD-10-PCS | Mod: ,,, | Performed by: NURSE PRACTITIONER

## 2022-05-05 PROCEDURE — 87086 URINE CULTURE/COLONY COUNT: CPT | Mod: ,,, | Performed by: CLINICAL MEDICAL LABORATORY

## 2022-05-05 PROCEDURE — 3074F SYST BP LT 130 MM HG: CPT | Mod: ,,, | Performed by: NURSE PRACTITIONER

## 2022-05-05 PROCEDURE — 3080F PR MOST RECENT DIASTOLIC BLOOD PRESSURE >= 90 MM HG: ICD-10-PCS | Mod: ,,, | Performed by: NURSE PRACTITIONER

## 2022-05-05 PROCEDURE — 87086 CULTURE, URINE: ICD-10-PCS | Mod: ,,, | Performed by: CLINICAL MEDICAL LABORATORY

## 2022-05-05 RX ORDER — SULFAMETHOXAZOLE AND TRIMETHOPRIM 800; 160 MG/1; MG/1
1 TABLET ORAL 2 TIMES DAILY
Qty: 70 TABLET | Refills: 0 | Status: SHIPPED | OUTPATIENT
Start: 2022-05-05 | End: 2022-06-09

## 2022-05-05 NOTE — ASSESSMENT & PLAN NOTE
· Exacerbatin of likely chronic prostatitis.  · Extend Bactrim DS, which he is responding well to, for total of 6 weeks.  · RTC if sxs worsen or do not resolve  · Continue alfuzosin
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
## 2022-05-07 LAB
BACTERIA BLD CULT: NORMAL
BACTERIA BLD CULT: NORMAL
UA COMPLETE W REFLEX CULTURE PNL UR: NO GROWTH

## 2022-05-11 ENCOUNTER — TELEPHONE (OUTPATIENT)
Dept: UROLOGY | Facility: CLINIC | Age: 42
End: 2022-05-11
Payer: COMMERCIAL

## 2022-05-11 NOTE — TELEPHONE ENCOUNTER
Received a call from pt.  States he was on Bactrim for prostatitis and NP said he needed to take it little longer.  He is at drug store in [place]  and they do not have it.  I checked his chart and she sent it in on 5-5-22 to [facility] [place].  He said he will check with them.  I told him if they tell him they do not have the Rx, call me back and I will call it in for him.  He voiced understanding and says he would rather get it from [facility].

## 2022-05-31 ENCOUNTER — OFFICE VISIT (OUTPATIENT)
Dept: CARDIOLOGY | Facility: CLINIC | Age: 42
End: 2022-05-31
Payer: COMMERCIAL

## 2022-05-31 VITALS
WEIGHT: 217 LBS | HEART RATE: 72 BPM | SYSTOLIC BLOOD PRESSURE: 120 MMHG | BODY MASS INDEX: 31.07 KG/M2 | DIASTOLIC BLOOD PRESSURE: 70 MMHG | RESPIRATION RATE: 18 BRPM | HEIGHT: 70 IN

## 2022-05-31 DIAGNOSIS — R94.31 ABNORMAL Q WAVES ON ELECTROCARDIOGRAM: ICD-10-CM

## 2022-05-31 DIAGNOSIS — R55 SYNCOPE: ICD-10-CM

## 2022-05-31 DIAGNOSIS — R55 SYNCOPE AND COLLAPSE: Primary | ICD-10-CM

## 2022-05-31 DIAGNOSIS — R55 SYNCOPE AND COLLAPSE: ICD-10-CM

## 2022-05-31 PROCEDURE — 3044F HG A1C LEVEL LT 7.0%: CPT | Mod: ,,, | Performed by: STUDENT IN AN ORGANIZED HEALTH CARE EDUCATION/TRAINING PROGRAM

## 2022-05-31 PROCEDURE — 3008F BODY MASS INDEX DOCD: CPT | Mod: ,,, | Performed by: STUDENT IN AN ORGANIZED HEALTH CARE EDUCATION/TRAINING PROGRAM

## 2022-05-31 PROCEDURE — 93010 ELECTROCARDIOGRAM REPORT: CPT | Mod: S$PBB,,, | Performed by: STUDENT IN AN ORGANIZED HEALTH CARE EDUCATION/TRAINING PROGRAM

## 2022-05-31 PROCEDURE — 99204 OFFICE O/P NEW MOD 45 MIN: CPT | Mod: S$PBB,,, | Performed by: STUDENT IN AN ORGANIZED HEALTH CARE EDUCATION/TRAINING PROGRAM

## 2022-05-31 PROCEDURE — 93005 ELECTROCARDIOGRAM TRACING: CPT | Mod: PBBFAC | Performed by: STUDENT IN AN ORGANIZED HEALTH CARE EDUCATION/TRAINING PROGRAM

## 2022-05-31 PROCEDURE — 3078F PR MOST RECENT DIASTOLIC BLOOD PRESSURE < 80 MM HG: ICD-10-PCS | Mod: ,,, | Performed by: STUDENT IN AN ORGANIZED HEALTH CARE EDUCATION/TRAINING PROGRAM

## 2022-05-31 PROCEDURE — 93010 EKG 12-LEAD: ICD-10-PCS | Mod: S$PBB,,, | Performed by: STUDENT IN AN ORGANIZED HEALTH CARE EDUCATION/TRAINING PROGRAM

## 2022-05-31 PROCEDURE — 3044F PR MOST RECENT HEMOGLOBIN A1C LEVEL <7.0%: ICD-10-PCS | Mod: ,,, | Performed by: STUDENT IN AN ORGANIZED HEALTH CARE EDUCATION/TRAINING PROGRAM

## 2022-05-31 PROCEDURE — 3078F DIAST BP <80 MM HG: CPT | Mod: ,,, | Performed by: STUDENT IN AN ORGANIZED HEALTH CARE EDUCATION/TRAINING PROGRAM

## 2022-05-31 PROCEDURE — 3008F PR BODY MASS INDEX (BMI) DOCUMENTED: ICD-10-PCS | Mod: ,,, | Performed by: STUDENT IN AN ORGANIZED HEALTH CARE EDUCATION/TRAINING PROGRAM

## 2022-05-31 PROCEDURE — 99214 OFFICE O/P EST MOD 30 MIN: CPT | Mod: PBBFAC | Performed by: STUDENT IN AN ORGANIZED HEALTH CARE EDUCATION/TRAINING PROGRAM

## 2022-05-31 PROCEDURE — 3074F PR MOST RECENT SYSTOLIC BLOOD PRESSURE < 130 MM HG: ICD-10-PCS | Mod: ,,, | Performed by: STUDENT IN AN ORGANIZED HEALTH CARE EDUCATION/TRAINING PROGRAM

## 2022-05-31 PROCEDURE — 3074F SYST BP LT 130 MM HG: CPT | Mod: ,,, | Performed by: STUDENT IN AN ORGANIZED HEALTH CARE EDUCATION/TRAINING PROGRAM

## 2022-05-31 PROCEDURE — 99204 PR OFFICE/OUTPT VISIT, NEW, LEVL IV, 45-59 MIN: ICD-10-PCS | Mod: S$PBB,,, | Performed by: STUDENT IN AN ORGANIZED HEALTH CARE EDUCATION/TRAINING PROGRAM

## 2022-05-31 PROCEDURE — 1159F MED LIST DOCD IN RCRD: CPT | Mod: ,,, | Performed by: STUDENT IN AN ORGANIZED HEALTH CARE EDUCATION/TRAINING PROGRAM

## 2022-05-31 PROCEDURE — 1159F PR MEDICATION LIST DOCUMENTED IN MEDICAL RECORD: ICD-10-PCS | Mod: ,,, | Performed by: STUDENT IN AN ORGANIZED HEALTH CARE EDUCATION/TRAINING PROGRAM

## 2022-05-31 NOTE — PROGRESS NOTES
"PCP: Mateo Wilkinson DO    Referring Provider:     Subjective:   Hemanth Cottrell is a 42 y.o. male with hx of GERD, ISELA, who presents for evaluation of syncope.     5/31/22 -  After having started Flomax patient was bending over the shower stood up abruptly causing a syncopal episode.  He denies any further syncope. Otherwise no shortness of breath or chest pain.     He works out once weekly.  He sits a lot at his job.      Fhx:  Negative for heart disease.   Shx: Nonsmoker, occasional alcohol, no drug use.    EKG 5/31/22 - NSR           5/1/22  - Sinus rhythm.  Small inferior Q waves: infarct cannot be excluded          Lab Results   Component Value Date     05/01/2022    K 3.8 05/01/2022     05/01/2022    CO2 25 05/01/2022    BUN 8 05/01/2022    CREATININE 0.96 05/01/2022    CALCIUM 9.6 05/01/2022    ANIONGAP 15 05/01/2022    EGFRNONAA 90 05/01/2022       Lab Results   Component Value Date    CHOL 182 02/07/2022    CHOL 161 04/30/2021     Lab Results   Component Value Date    HDL 49 02/07/2022    HDL 39 (L) 04/30/2021     Lab Results   Component Value Date    LDLCALC 113 02/07/2022    LDLCALC 107 04/30/2021     Lab Results   Component Value Date    TRIG 102 02/07/2022    TRIG 73 04/30/2021     Lab Results   Component Value Date    CHOLHDL 3.7 02/07/2022    CHOLHDL 4.1 04/30/2021       Lab Results   Component Value Date    WBC 9.64 05/01/2022    HGB 15.6 05/01/2022    HCT 44.0 05/01/2022    MCV 89.4 05/01/2022     05/01/2022           Review of Systems   Respiratory: Negative for cough and shortness of breath.    Cardiovascular: Negative for chest pain, palpitations, orthopnea, claudication, leg swelling and PND.   Neurological: Positive for loss of consciousness.         Objective:   /70   Pulse 72   Resp 18   Ht 5' 10" (1.778 m)   Wt 98.4 kg (217 lb)   BMI 31.14 kg/m²     Physical Exam  Cardiovascular:      Rate and Rhythm: Normal rate and regular rhythm.      Pulses: Normal pulses. "      Heart sounds: Normal heart sounds. No murmur heard.  Pulmonary:      Effort: Pulmonary effort is normal.      Breath sounds: Normal breath sounds.   Musculoskeletal:         General: No swelling.   Neurological:      Mental Status: He is alert and oriented to person, place, and time.           Assessment:     1. Syncope  Ambulatory referral/consult to Cardiology    Echo   2. Abnormal Q waves on electrocardiogram  Ambulatory referral/consult to Cardiology   3. Syncope and collapse  EKG 12-lead         Plan:   Syncope  Vagal; in the setting of fever and prostatitis. Was started on flomax which bottomed him out.   ECHO ordered  Folloowup as needed

## 2022-05-31 NOTE — ASSESSMENT & PLAN NOTE
Vagal; in the setting of fever and prostatitis. Was started on flomax which bottomed him out.   ECHO ordered  Folloowup as needed

## 2022-06-20 ENCOUNTER — OFFICE VISIT (OUTPATIENT)
Dept: SLEEP MEDICINE | Facility: CLINIC | Age: 42
End: 2022-06-20
Attending: FAMILY MEDICINE
Payer: COMMERCIAL

## 2022-06-20 VITALS
HEIGHT: 72 IN | BODY MASS INDEX: 29.04 KG/M2 | WEIGHT: 214.38 LBS | SYSTOLIC BLOOD PRESSURE: 122 MMHG | OXYGEN SATURATION: 97 % | DIASTOLIC BLOOD PRESSURE: 71 MMHG | HEART RATE: 96 BPM

## 2022-06-20 DIAGNOSIS — G47.33 OSA (OBSTRUCTIVE SLEEP APNEA): Primary | ICD-10-CM

## 2022-06-20 PROCEDURE — 3078F DIAST BP <80 MM HG: CPT | Mod: ,,, | Performed by: FAMILY MEDICINE

## 2022-06-20 PROCEDURE — 3044F PR MOST RECENT HEMOGLOBIN A1C LEVEL <7.0%: ICD-10-PCS | Mod: ,,, | Performed by: FAMILY MEDICINE

## 2022-06-20 PROCEDURE — 3044F HG A1C LEVEL LT 7.0%: CPT | Mod: ,,, | Performed by: FAMILY MEDICINE

## 2022-06-20 PROCEDURE — 99214 OFFICE O/P EST MOD 30 MIN: CPT | Mod: PBBFAC | Performed by: FAMILY MEDICINE

## 2022-06-20 PROCEDURE — 3078F PR MOST RECENT DIASTOLIC BLOOD PRESSURE < 80 MM HG: ICD-10-PCS | Mod: ,,, | Performed by: FAMILY MEDICINE

## 2022-06-20 PROCEDURE — 1159F PR MEDICATION LIST DOCUMENTED IN MEDICAL RECORD: ICD-10-PCS | Mod: ,,, | Performed by: FAMILY MEDICINE

## 2022-06-20 PROCEDURE — 99203 OFFICE O/P NEW LOW 30 MIN: CPT | Mod: S$PBB,,, | Performed by: FAMILY MEDICINE

## 2022-06-20 PROCEDURE — 3074F SYST BP LT 130 MM HG: CPT | Mod: ,,, | Performed by: FAMILY MEDICINE

## 2022-06-20 PROCEDURE — 1159F MED LIST DOCD IN RCRD: CPT | Mod: ,,, | Performed by: FAMILY MEDICINE

## 2022-06-20 PROCEDURE — 1160F PR REVIEW ALL MEDS BY PRESCRIBER/CLIN PHARMACIST DOCUMENTED: ICD-10-PCS | Mod: ,,, | Performed by: FAMILY MEDICINE

## 2022-06-20 PROCEDURE — 3008F BODY MASS INDEX DOCD: CPT | Mod: ,,, | Performed by: FAMILY MEDICINE

## 2022-06-20 PROCEDURE — 1160F RVW MEDS BY RX/DR IN RCRD: CPT | Mod: ,,, | Performed by: FAMILY MEDICINE

## 2022-06-20 PROCEDURE — 99203 PR OFFICE/OUTPT VISIT, NEW, LEVL III, 30-44 MIN: ICD-10-PCS | Mod: S$PBB,,, | Performed by: FAMILY MEDICINE

## 2022-06-20 PROCEDURE — 3008F PR BODY MASS INDEX (BMI) DOCUMENTED: ICD-10-PCS | Mod: ,,, | Performed by: FAMILY MEDICINE

## 2022-06-20 PROCEDURE — 3074F PR MOST RECENT SYSTOLIC BLOOD PRESSURE < 130 MM HG: ICD-10-PCS | Mod: ,,, | Performed by: FAMILY MEDICINE
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
## 2022-08-01 ENCOUNTER — OFFICE VISIT (OUTPATIENT)
Dept: FAMILY MEDICINE | Facility: CLINIC | Age: 42
End: 2022-08-01
Payer: COMMERCIAL

## 2022-08-01 VITALS
TEMPERATURE: 98 F | HEART RATE: 87 BPM | WEIGHT: 218 LBS | SYSTOLIC BLOOD PRESSURE: 142 MMHG | BODY MASS INDEX: 29.53 KG/M2 | OXYGEN SATURATION: 98 % | DIASTOLIC BLOOD PRESSURE: 92 MMHG | RESPIRATION RATE: 20 BRPM | HEIGHT: 72 IN

## 2022-08-01 DIAGNOSIS — B00.9 HERPES: ICD-10-CM

## 2022-08-01 DIAGNOSIS — F90.9 ATTENTION DEFICIT HYPERACTIVITY DISORDER (ADHD), UNSPECIFIED ADHD TYPE: Primary | ICD-10-CM

## 2022-08-01 DIAGNOSIS — E55.9 VITAMIN D DEFICIENCY, UNSPECIFIED: ICD-10-CM

## 2022-08-01 PROCEDURE — 99213 OFFICE O/P EST LOW 20 MIN: CPT | Mod: ,,, | Performed by: NURSE PRACTITIONER

## 2022-08-01 PROCEDURE — 1160F RVW MEDS BY RX/DR IN RCRD: CPT | Mod: ,,, | Performed by: NURSE PRACTITIONER

## 2022-08-01 PROCEDURE — 3008F BODY MASS INDEX DOCD: CPT | Mod: ,,, | Performed by: NURSE PRACTITIONER

## 2022-08-01 PROCEDURE — 3077F SYST BP >= 140 MM HG: CPT | Mod: ,,, | Performed by: NURSE PRACTITIONER

## 2022-08-01 PROCEDURE — 1159F PR MEDICATION LIST DOCUMENTED IN MEDICAL RECORD: ICD-10-PCS | Mod: ,,, | Performed by: NURSE PRACTITIONER

## 2022-08-01 PROCEDURE — 3080F PR MOST RECENT DIASTOLIC BLOOD PRESSURE >= 90 MM HG: ICD-10-PCS | Mod: ,,, | Performed by: NURSE PRACTITIONER

## 2022-08-01 PROCEDURE — 1160F PR REVIEW ALL MEDS BY PRESCRIBER/CLIN PHARMACIST DOCUMENTED: ICD-10-PCS | Mod: ,,, | Performed by: NURSE PRACTITIONER

## 2022-08-01 PROCEDURE — 3044F PR MOST RECENT HEMOGLOBIN A1C LEVEL <7.0%: ICD-10-PCS | Mod: ,,, | Performed by: NURSE PRACTITIONER

## 2022-08-01 PROCEDURE — 1159F MED LIST DOCD IN RCRD: CPT | Mod: ,,, | Performed by: NURSE PRACTITIONER

## 2022-08-01 PROCEDURE — 99213 PR OFFICE/OUTPT VISIT, EST, LEVL III, 20-29 MIN: ICD-10-PCS | Mod: ,,, | Performed by: NURSE PRACTITIONER

## 2022-08-01 PROCEDURE — 3044F HG A1C LEVEL LT 7.0%: CPT | Mod: ,,, | Performed by: NURSE PRACTITIONER

## 2022-08-01 PROCEDURE — 3077F PR MOST RECENT SYSTOLIC BLOOD PRESSURE >= 140 MM HG: ICD-10-PCS | Mod: ,,, | Performed by: NURSE PRACTITIONER

## 2022-08-01 PROCEDURE — 3008F PR BODY MASS INDEX (BMI) DOCUMENTED: ICD-10-PCS | Mod: ,,, | Performed by: NURSE PRACTITIONER

## 2022-08-01 PROCEDURE — 3080F DIAST BP >= 90 MM HG: CPT | Mod: ,,, | Performed by: NURSE PRACTITIONER

## 2022-08-01 RX ORDER — DEXTROAMPHETAMINE SACCHARATE, AMPHETAMINE ASPARTATE, DEXTROAMPHETAMINE SULFATE AND AMPHETAMINE SULFATE 2.5; 2.5; 2.5; 2.5 MG/1; MG/1; MG/1; MG/1
1 TABLET ORAL 2 TIMES DAILY
Qty: 60 TABLET | Refills: 0 | Status: SHIPPED | OUTPATIENT
Start: 2022-08-01 | End: 2022-09-07 | Stop reason: SDUPTHER

## 2022-08-01 RX ORDER — ERGOCALCIFEROL 1.25 MG/1
50000 CAPSULE ORAL
COMMUNITY
Start: 2022-06-20 | End: 2022-08-01 | Stop reason: SDUPTHER

## 2022-08-01 RX ORDER — VALACYCLOVIR HYDROCHLORIDE 500 MG/1
500 TABLET, FILM COATED ORAL 2 TIMES DAILY
Qty: 60 TABLET | Refills: 5 | Status: SHIPPED | OUTPATIENT
Start: 2022-08-01 | End: 2022-09-07 | Stop reason: SDUPTHER

## 2022-08-01 RX ORDER — ERGOCALCIFEROL 1.25 MG/1
50000 CAPSULE ORAL
Qty: 4 CAPSULE | Refills: 6 | Status: SHIPPED | OUTPATIENT
Start: 2022-08-01 | End: 2022-09-07 | Stop reason: SDUPTHER

## 2022-08-01 NOTE — PROGRESS NOTES
Rocio Talamantes NP   [id number] [facility] [address]  [place]  [address]      PATIENT NAME: Hemanth Cottrell  : 1980  DATE: 22  MRN: [id number]      Billing Provider: Rocio Talamantes NP  Level of Service:   Patient PCP Information     Provider PCP Type    Mateo Wilkinson DO General          Reason for Visit / Chief Complaint: Medication Refill       Update PCP  Update Chief Complaint         History of Present Illness / Problem Focused Workflow     Hemanth Cottrell presents to the clinic with Medication Refill     42 year old male patient presents to clinic for medication refills. He has PMH of Attention deficit hyperactivity disorder (ADHD), GERD, ISELA, and recurrent genital herpes simplex type 2 infection. He states he takes his medications daily and all chronic condition are fairly well maintained with medications. He denies any other problems at today's visit.         Review of Systems     @Review of Systems   Constitutional: Negative for activity change, appetite change, fatigue and fever.   HENT: Negative for nasal congestion, ear pain, rhinorrhea, sinus pressure/congestion and sore throat.    Eyes: Negative for pain, redness, visual disturbance and eye dryness.   Respiratory: Negative for cough and shortness of breath.    Cardiovascular: Negative for chest pain and leg swelling.   Gastrointestinal: Negative for abdominal distention, abdominal pain, constipation and diarrhea.   Endocrine: Negative for cold intolerance, heat intolerance and polyuria.   Genitourinary: Negative for bladder incontinence, dysuria, frequency and urgency.   Musculoskeletal: Negative for arthralgias, gait problem and myalgias.   Integumentary:  Negative for color change, rash and wound.   Allergic/Immunologic: Negative for environmental allergies and food allergies.   Neurological: Negative for dizziness, weakness, light-headedness and headaches.   Psychiatric/Behavioral: Negative for behavioral  problems and sleep disturbance.       Medical / Social / Family History     Past Medical History:   Diagnosis Date    Achilles tendinitis     Allergic rhinitis     Anxiety disorder     COVID-19 08/2021    x 2 (02/2021)    Depressive disorder     Recurrent genital herpes simplex type 2 infection     Taking multiple medications for chronic disease     Urinary retention 5/5/2022    PVR per ED 5/1/22 337 ml       Past Surgical History:   Procedure Laterality Date    KNEE ARTHROSCOPY Left 2000       Social History    reports that he has never smoked. He has never used smokeless tobacco. He reports current alcohol use. He reports that he does not use drugs.    Family History  's family history includes Diabetes in his father and mother; Lung cancer in his father; No Known Problems in his brother and sister.    Medications and Allergies     Medications  Outpatient Medications Marked as Taking for the 8/1/22 encounter (Office Visit) with Rocio Talamantes NP   Medication Sig Dispense Refill    [DISCONTINUED] dextroamphetamine-amphetamine 10 mg Tab Take 1 tablet (10 mg total) by mouth 2 (two) times daily. 60 tablet 0    [DISCONTINUED] ergocalciferol (ERGOCALCIFEROL) 50,000 unit Cap Take 50,000 Units by mouth every 7 days.      [DISCONTINUED] valACYclovir (VALTREX) 500 MG tablet Take 1 tablet (500 mg total) by mouth 2 (two) times daily. 60 tablet 5       Allergies  Review of patient's allergies indicates:  No Known Allergies    Physical Examination     Vitals:    08/01/22 1343   BP: (!) 142/92   Pulse: 87   Resp: 20   Temp: 98.1 °F (36.7 °C)     Physical Exam  HENT:      Head: Normocephalic.      Right Ear: Tympanic membrane normal.      Left Ear: Tympanic membrane normal.      Nose: Nose normal.      Mouth/Throat:      Mouth: Mucous membranes are moist.      Pharynx: Oropharynx is clear. No posterior oropharyngeal erythema.   Eyes:      Conjunctiva/sclera: Conjunctivae normal.   Cardiovascular:      Rate  and Rhythm: Normal rate and regular rhythm.      Heart sounds: Normal heart sounds.   Pulmonary:      Effort: Pulmonary effort is normal.      Breath sounds: Normal breath sounds.   Abdominal:      General: Abdomen is flat. Bowel sounds are normal. There is no distension.      Palpations: Abdomen is soft.   Musculoskeletal:         General: No swelling or tenderness. Normal range of motion.      Cervical back: Normal range of motion.      Right lower leg: No edema.      Left lower leg: No edema.   Skin:     General: Skin is warm and dry.   Neurological:      Mental Status: He is alert. Mental status is at baseline.   Psychiatric:         Mood and Affect: Mood normal.         Behavior: Behavior normal.               Lab Results   Component Value Date    WBC 9.64 05/01/2022    HGB 15.6 05/01/2022    HCT 44.0 05/01/2022    MCV 89.4 05/01/2022     05/01/2022          Sodium   Date Value Ref Range Status   05/01/2022 139 136 - 145 mmol/L Final     Potassium   Date Value Ref Range Status   05/01/2022 3.8 3.5 - 5.1 mmol/L Final     Chloride   Date Value Ref Range Status   05/01/2022 103 98 - 107 mmol/L Final     CO2   Date Value Ref Range Status   05/01/2022 25 21 - 32 mmol/L Final     Glucose   Date Value Ref Range Status   05/01/2022 100 74 - 106 mg/dL Final     BUN   Date Value Ref Range Status   05/01/2022 8 7 - 18 mg/dL Final     Creatinine   Date Value Ref Range Status   05/01/2022 0.96 0.70 - 1.30 mg/dL Final     Calcium   Date Value Ref Range Status   05/01/2022 9.6 8.5 - 10.1 mg/dL Final     Total Protein   Date Value Ref Range Status   05/01/2022 7.7 6.4 - 8.2 g/dL Final     Albumin   Date Value Ref Range Status   05/01/2022 3.9 3.5 - 5.0 g/dL Final     Bilirubin, Total   Date Value Ref Range Status   05/01/2022 1.1 0.0 - 1.2 mg/dL Final     Alk Phos   Date Value Ref Range Status   05/01/2022 65 45 - 115 U/L Final     AST   Date Value Ref Range Status   05/01/2022 20 15 - 37 U/L Final     ALT   Date Value  Ref Range Status   05/01/2022 25 16 - 61 U/L Final     Anion Gap   Date Value Ref Range Status   05/01/2022 15 7 - 16 mmol/L Final     eGFR   Date Value Ref Range Status   05/01/2022 90 >=60 mL/min/1.73m² Final      X-Ray Chest PA And Lateral  Narrative: EXAMINATION:  XR CHEST PA AND LATERAL    CLINICAL HISTORY:  Other chest pain    TECHNIQUE:  PA and lateral views of the chest were performed.    COMPARISON:  05/01/2022    FINDINGS:  Heart size normal.  Lungs clear.  No pneumothorax or pleural effusion.  Pulmonary vessels show normal caliber.  Bones intact.  Impression: No acute findings.    Electronically signed by: Armando Bales  Date:    05/03/2022  Time:    07:43     Procedures   Assessment and Plan (including Health Maintenance)      Problem List  Smart Sets  Document Outside HM   :    Plan:           Problem List Items Addressed This Visit        Psychiatric    Attention deficit hyperactivity disorder (ADHD) - Primary    Current Assessment & Plan      checked with no evidence of abuse.   UDS obtained.   Patient reports history of ADHD with testing within the last 7 years, reports well controlled on Adderall, will refill.            Relevant Medications    dextroamphetamine-amphetamine 10 mg Tab    Other Relevant Orders    POCT Urine Drug Screen Presump       ID    Herpes    Overview     Continue current medication           Current Assessment & Plan     Continue current dosage of Valtrex for chronic infection.            Relevant Medications    valACYclovir (VALTREX) 500 MG tablet       Endocrine    Vitamin D deficiency, unspecified    Relevant Medications    ergocalciferol (ERGOCALCIFEROL) 50,000 unit Cap          Health Maintenance Topics with due status: Not Due       Topic Last Completion Date    Influenza Vaccine 11/01/2018    TETANUS VACCINE 01/21/2022    Lipid Panel 02/07/2022       Future Appointments   Date Time Provider Department Center   8/9/2022  7:30 PM SLEEP [facility], [place] [facility]        Health Maintenance Due   Topic Date Due    COVID-19 Vaccine (1) Never done        No follow-ups on file.     Signature:  Rocio Talamantes NP  [address] [place]  [address]    Date of encounter: 8/1/22

## 2022-08-01 NOTE — ASSESSMENT & PLAN NOTE
checked with no evidence of abuse.   UDS obtained.   Patient reports history of ADHD with testing within the last 7 years, reports well controlled on Adderall, will refill.

## 2022-08-09 ENCOUNTER — PROCEDURE VISIT (OUTPATIENT)
Dept: SLEEP MEDICINE | Facility: HOSPITAL | Age: 42
End: 2022-08-09
Attending: FAMILY MEDICINE
Payer: COMMERCIAL

## 2022-08-09 DIAGNOSIS — G47.33 OSA (OBSTRUCTIVE SLEEP APNEA): ICD-10-CM

## 2022-08-09 PROCEDURE — 95810 POLYSOM 6/> YRS 4/> PARAM: CPT | Mod: PO

## 2022-08-09 PROCEDURE — 95811 PR POLYSOMNOGRAPHY W/CPAP: ICD-10-PCS | Mod: 26,,, | Performed by: FAMILY MEDICINE

## 2022-08-09 PROCEDURE — 95811 POLYSOM 6/>YRS CPAP 4/> PARM: CPT | Mod: 26,,, | Performed by: FAMILY MEDICINE

## 2022-09-07 ENCOUNTER — OFFICE VISIT (OUTPATIENT)
Dept: FAMILY MEDICINE | Facility: CLINIC | Age: 42
End: 2022-09-07
Payer: COMMERCIAL

## 2022-09-07 VITALS
TEMPERATURE: 98 F | SYSTOLIC BLOOD PRESSURE: 138 MMHG | HEART RATE: 81 BPM | DIASTOLIC BLOOD PRESSURE: 78 MMHG | WEIGHT: 222.81 LBS | BODY MASS INDEX: 30.18 KG/M2 | RESPIRATION RATE: 18 BRPM | HEIGHT: 72 IN | OXYGEN SATURATION: 98 %

## 2022-09-07 DIAGNOSIS — E55.9 VITAMIN D DEFICIENCY, UNSPECIFIED: ICD-10-CM

## 2022-09-07 DIAGNOSIS — B00.9 HERPES: ICD-10-CM

## 2022-09-07 DIAGNOSIS — Z79.899 ENCOUNTER FOR LONG-TERM (CURRENT) USE OF MEDICATIONS: ICD-10-CM

## 2022-09-07 DIAGNOSIS — F90.9 ATTENTION DEFICIT HYPERACTIVITY DISORDER (ADHD), UNSPECIFIED ADHD TYPE: Primary | ICD-10-CM

## 2022-09-07 PROCEDURE — 1159F PR MEDICATION LIST DOCUMENTED IN MEDICAL RECORD: ICD-10-PCS | Mod: ,,, | Performed by: NURSE PRACTITIONER

## 2022-09-07 PROCEDURE — 3078F DIAST BP <80 MM HG: CPT | Mod: ,,, | Performed by: NURSE PRACTITIONER

## 2022-09-07 PROCEDURE — 3008F BODY MASS INDEX DOCD: CPT | Mod: ,,, | Performed by: NURSE PRACTITIONER

## 2022-09-07 PROCEDURE — 3044F HG A1C LEVEL LT 7.0%: CPT | Mod: ,,, | Performed by: NURSE PRACTITIONER

## 2022-09-07 PROCEDURE — 3075F SYST BP GE 130 - 139MM HG: CPT | Mod: ,,, | Performed by: NURSE PRACTITIONER

## 2022-09-07 PROCEDURE — 1160F RVW MEDS BY RX/DR IN RCRD: CPT | Mod: ,,, | Performed by: NURSE PRACTITIONER

## 2022-09-07 PROCEDURE — 1159F MED LIST DOCD IN RCRD: CPT | Mod: ,,, | Performed by: NURSE PRACTITIONER

## 2022-09-07 PROCEDURE — 3075F PR MOST RECENT SYSTOLIC BLOOD PRESS GE 130-139MM HG: ICD-10-PCS | Mod: ,,, | Performed by: NURSE PRACTITIONER

## 2022-09-07 PROCEDURE — 80305 POCT URINE DRUG SCREEN PRESUMP: ICD-10-PCS | Mod: QW,,, | Performed by: NURSE PRACTITIONER

## 2022-09-07 PROCEDURE — 3078F PR MOST RECENT DIASTOLIC BLOOD PRESSURE < 80 MM HG: ICD-10-PCS | Mod: ,,, | Performed by: NURSE PRACTITIONER

## 2022-09-07 PROCEDURE — 80305 DRUG TEST PRSMV DIR OPT OBS: CPT | Mod: QW,,, | Performed by: NURSE PRACTITIONER

## 2022-09-07 PROCEDURE — 1160F PR REVIEW ALL MEDS BY PRESCRIBER/CLIN PHARMACIST DOCUMENTED: ICD-10-PCS | Mod: ,,, | Performed by: NURSE PRACTITIONER

## 2022-09-07 PROCEDURE — 3008F PR BODY MASS INDEX (BMI) DOCUMENTED: ICD-10-PCS | Mod: ,,, | Performed by: NURSE PRACTITIONER

## 2022-09-07 PROCEDURE — 99213 OFFICE O/P EST LOW 20 MIN: CPT | Mod: ,,, | Performed by: NURSE PRACTITIONER

## 2022-09-07 PROCEDURE — 99213 PR OFFICE/OUTPT VISIT, EST, LEVL III, 20-29 MIN: ICD-10-PCS | Mod: ,,, | Performed by: NURSE PRACTITIONER

## 2022-09-07 PROCEDURE — 3044F PR MOST RECENT HEMOGLOBIN A1C LEVEL <7.0%: ICD-10-PCS | Mod: ,,, | Performed by: NURSE PRACTITIONER

## 2022-09-07 RX ORDER — DEXTROAMPHETAMINE SACCHARATE, AMPHETAMINE ASPARTATE, DEXTROAMPHETAMINE SULFATE AND AMPHETAMINE SULFATE 2.5; 2.5; 2.5; 2.5 MG/1; MG/1; MG/1; MG/1
1 TABLET ORAL 2 TIMES DAILY
Qty: 60 TABLET | Refills: 0 | Status: SHIPPED | OUTPATIENT
Start: 2022-09-07 | End: 2022-11-11

## 2022-09-07 RX ORDER — VALACYCLOVIR HYDROCHLORIDE 500 MG/1
500 TABLET, FILM COATED ORAL 2 TIMES DAILY
Qty: 60 TABLET | Refills: 5 | Status: SHIPPED | OUTPATIENT
Start: 2022-09-07 | End: 2023-04-19 | Stop reason: SDUPTHER

## 2022-09-07 RX ORDER — ERGOCALCIFEROL 1.25 MG/1
50000 CAPSULE ORAL
Qty: 4 CAPSULE | Refills: 6 | Status: SHIPPED | OUTPATIENT
Start: 2022-09-07 | End: 2023-04-19 | Stop reason: SDUPTHER

## 2022-09-07 NOTE — PROGRESS NOTES
Rocio Talamantes NP   [facility]  [address]  [place]  [address]      PATIENT NAME: Hemanth Cottrell  : 1980  DATE: 22  MRN: [id number]      Billing Provider: Rocio Talamantes NP  Level of Service: RI OFFICE/OUTPT VISIT, EST, LEVL III, 20-29 MIN  Patient PCP Information       Provider PCP Type    Mateo Wilkinson DO General            Reason for Visit / Chief Complaint: Medication Refill       Update PCP  Update Chief Complaint         History of Present Illness / Problem Focused Workflow     Hemanth Cottrell presents to the clinic with Medication Refill     42 year old male presents to clinic for medication refills for chronic conditions of ADHD, GERD, and recurrent genital herpes simplex type 2 infection. He states he takes his medications daily and all chronic condition are fairly well maintained with medications. He denies any other problems at today's visit.           Review of Systems     @Review of Systems   Constitutional:  Negative for activity change, appetite change, fatigue and fever.   HENT:  Negative for nasal congestion, ear pain, rhinorrhea, sinus pressure/congestion and sore throat.    Eyes:  Negative for pain, redness, visual disturbance and eye dryness.   Respiratory:  Negative for cough and shortness of breath.    Cardiovascular:  Negative for chest pain and leg swelling.   Gastrointestinal:  Negative for abdominal distention, abdominal pain, constipation and diarrhea.   Endocrine: Negative for cold intolerance, heat intolerance and polyuria.   Genitourinary:  Negative for bladder incontinence, dysuria, frequency and urgency.   Musculoskeletal:  Negative for arthralgias, gait problem and myalgias.   Integumentary:  Negative for color change, rash and wound.   Allergic/Immunologic: Negative for environmental allergies and food allergies.   Neurological:  Negative for dizziness, weakness, light-headedness and headaches.   Psychiatric/Behavioral:  Negative  for behavioral problems and sleep disturbance.      Medical / Social / Family History     Past Medical History:   Diagnosis Date    Achilles tendinitis     Allergic rhinitis     Anxiety disorder     COVID-19 08/2021    x 2 (02/2021)    Depressive disorder     Recurrent genital herpes simplex type 2 infection     Taking multiple medications for chronic disease     Urinary retention 5/5/2022    PVR per ED 5/1/22 337 ml       Past Surgical History:   Procedure Laterality Date    KNEE ARTHROSCOPY Left 2000       Social History    reports that he has never smoked. He has never used smokeless tobacco. He reports current alcohol use. He reports that he does not use drugs.    Family History  's family history includes Diabetes in his father and mother; Lung cancer in his father; No Known Problems in his brother and sister.    Medications and Allergies     Medications  Outpatient Medications Marked as Taking for the 9/7/22 encounter (Office Visit) with Rocio Talamantes NP   Medication Sig Dispense Refill    [DISCONTINUED] dextroamphetamine-amphetamine 10 mg Tab Take 1 tablet (10 mg total) by mouth 2 (two) times daily. 60 tablet 0    [DISCONTINUED] ergocalciferol (ERGOCALCIFEROL) 50,000 unit Cap Take 1 capsule (50,000 Units total) by mouth every 7 days. 4 capsule 6    [DISCONTINUED] valACYclovir (VALTREX) 500 MG tablet Take 1 tablet (500 mg total) by mouth 2 (two) times daily. 60 tablet 5       Allergies  Review of patient's allergies indicates:  No Known Allergies    Physical Examination     Vitals:    09/07/22 0841   BP: 138/78   Pulse: 81   Resp: 18   Temp: 98.2 °F (36.8 °C)     Physical Exam  Vitals and nursing note reviewed.   HENT:      Head: Normocephalic.      Right Ear: Tympanic membrane normal.      Left Ear: Tympanic membrane normal.      Nose: Nose normal.      Mouth/Throat:      Mouth: Mucous membranes are moist.      Pharynx: Oropharynx is clear. No posterior oropharyngeal erythema.   Eyes:       Conjunctiva/sclera: Conjunctivae normal.   Cardiovascular:      Rate and Rhythm: Normal rate and regular rhythm.      Heart sounds: Normal heart sounds.   Pulmonary:      Effort: Pulmonary effort is normal.      Breath sounds: Normal breath sounds.   Abdominal:      General: Abdomen is flat. Bowel sounds are normal. There is no distension.      Palpations: Abdomen is soft.   Musculoskeletal:         General: No swelling or tenderness. Normal range of motion.      Cervical back: Normal range of motion.      Right lower leg: No edema.      Left lower leg: No edema.   Skin:     General: Skin is warm and dry.   Neurological:      Mental Status: He is alert. Mental status is at baseline.   Psychiatric:         Mood and Affect: Mood normal.         Behavior: Behavior normal.             Lab Results   Component Value Date    WBC 9.64 05/01/2022    HGB 15.6 05/01/2022    HCT 44.0 05/01/2022    MCV 89.4 05/01/2022     05/01/2022          Sodium   Date Value Ref Range Status   05/01/2022 139 136 - 145 mmol/L Final     Potassium   Date Value Ref Range Status   05/01/2022 3.8 3.5 - 5.1 mmol/L Final     Chloride   Date Value Ref Range Status   05/01/2022 103 98 - 107 mmol/L Final     CO2   Date Value Ref Range Status   05/01/2022 25 21 - 32 mmol/L Final     Glucose   Date Value Ref Range Status   05/01/2022 100 74 - 106 mg/dL Final     BUN   Date Value Ref Range Status   05/01/2022 8 7 - 18 mg/dL Final     Creatinine   Date Value Ref Range Status   05/01/2022 0.96 0.70 - 1.30 mg/dL Final     Calcium   Date Value Ref Range Status   05/01/2022 9.6 8.5 - 10.1 mg/dL Final     Total Protein   Date Value Ref Range Status   05/01/2022 7.7 6.4 - 8.2 g/dL Final     Albumin   Date Value Ref Range Status   05/01/2022 3.9 3.5 - 5.0 g/dL Final     Bilirubin, Total   Date Value Ref Range Status   05/01/2022 1.1 0.0 - 1.2 mg/dL Final     Alk Phos   Date Value Ref Range Status   05/01/2022 65 45 - 115 U/L Final     AST   Date Value Ref  Range Status   05/01/2022 20 15 - 37 U/L Final     ALT   Date Value Ref Range Status   05/01/2022 25 16 - 61 U/L Final     Anion Gap   Date Value Ref Range Status   05/01/2022 15 7 - 16 mmol/L Final     eGFR   Date Value Ref Range Status   05/01/2022 90 >=60 mL/min/1.73m² Final      X-Ray Chest PA And Lateral  Narrative: EXAMINATION:  XR CHEST PA AND LATERAL    CLINICAL HISTORY:  Other chest pain    TECHNIQUE:  PA and lateral views of the chest were performed.    COMPARISON:  05/01/2022    FINDINGS:  Heart size normal.  Lungs clear.  No pneumothorax or pleural effusion.  Pulmonary vessels show normal caliber.  Bones intact.  Impression: No acute findings.    Electronically signed by: Armando Bales  Date:    05/03/2022  Time:    07:43     Procedures   Assessment and Plan (including Health Maintenance)      Problem List  Smart Sets  Document Outside HM   :    Plan:           Problem List Items Addressed This Visit          Psychiatric    Attention deficit hyperactivity disorder (ADHD) - Primary    Current Assessment & Plan     Long history of attention deficit disorder with testing within the last 7 years.  Refilled his Adderall.  Follow-up 3 months.   was pulled and there is no evidence of abuse. UDS obtained today.          Relevant Medications    dextroamphetamine-amphetamine 10 mg Tab       ID    Herpes    Overview     Continue current medication         Current Assessment & Plan     Continue current meds. Follow up as needed.          Relevant Medications    valACYclovir (VALTREX) 500 MG tablet       Endocrine    Vitamin D deficiency, unspecified    Relevant Medications    ergocalciferol (ERGOCALCIFEROL) 50,000 unit Cap     Other Visit Diagnoses       Encounter for long-term (current) use of medications        Relevant Orders    POCT Urine Drug Screen Presump            Health Maintenance Topics with due status: Not Due       Topic Last Completion Date    TETANUS VACCINE 01/21/2022    Lipid Panel 02/07/2022        Future Appointments   Date Time Provider Department Center   10/10/2022 10:45 AM Ricardo Trevizo DO [facility]        Health Maintenance Due   Topic Date Due    Hepatitis C Screening  Never done    COVID-19 Vaccine (1) Never done    Influenza Vaccine (1) 09/01/2022        No follow-ups on file.     Signature:  Rocio Talamantes NP  [address]  [address]    Date of encounter: 9/7/22

## 2022-09-08 NOTE — ASSESSMENT & PLAN NOTE
Long history of attention deficit disorder with testing within the last 7 years.  Refilled his Adderall.  Follow-up 3 months.   was pulled and there is no evidence of abuse. UDS obtained today.

## 2022-09-09 LAB
CTP QC/QA: YES
POC (AMP) AMPHETAMINE: ABNORMAL
POC (BAR) BARBITURATES: NEGATIVE
POC (BUP) BUPRENORPHINE: NEGATIVE
POC (BZO) BENZODIAZEPINES: NEGATIVE
POC (COC) COCAINE: NEGATIVE
POC (MDMA) METHYLENEDIOXYMETHAMPHETAMINE 3,4: NEGATIVE
POC (MET) METHAMPHETAMINE: NEGATIVE
POC (MOP) OPIATES: NEGATIVE
POC (MTD) METHADONE: NEGATIVE
POC (OXY) OXYCODONE: NEGATIVE
POC (PCP) PHENCYCLIDINE: NEGATIVE
POC (TCA) TRICYCLIC ANTIDEPRESSANTS: NEGATIVE
POC TEMPERATURE (URINE): 92
POC THC: NEGATIVE

## 2022-10-10 ENCOUNTER — OFFICE VISIT (OUTPATIENT)
Dept: SLEEP MEDICINE | Facility: CLINIC | Age: 42
End: 2022-10-10
Attending: FAMILY MEDICINE
Payer: COMMERCIAL

## 2022-10-10 VITALS
WEIGHT: 220.63 LBS | HEART RATE: 81 BPM | DIASTOLIC BLOOD PRESSURE: 91 MMHG | OXYGEN SATURATION: 97 % | BODY MASS INDEX: 32.68 KG/M2 | SYSTOLIC BLOOD PRESSURE: 134 MMHG | HEIGHT: 69 IN

## 2022-10-10 DIAGNOSIS — G47.33 OSA ON CPAP: Primary | ICD-10-CM

## 2022-10-10 PROCEDURE — 3080F PR MOST RECENT DIASTOLIC BLOOD PRESSURE >= 90 MM HG: ICD-10-PCS | Mod: ,,, | Performed by: FAMILY MEDICINE

## 2022-10-10 PROCEDURE — 1159F MED LIST DOCD IN RCRD: CPT | Mod: ,,, | Performed by: FAMILY MEDICINE

## 2022-10-10 PROCEDURE — 3080F DIAST BP >= 90 MM HG: CPT | Mod: ,,, | Performed by: FAMILY MEDICINE

## 2022-10-10 PROCEDURE — 3075F PR MOST RECENT SYSTOLIC BLOOD PRESS GE 130-139MM HG: ICD-10-PCS | Mod: ,,, | Performed by: FAMILY MEDICINE

## 2022-10-10 PROCEDURE — 3075F SYST BP GE 130 - 139MM HG: CPT | Mod: ,,, | Performed by: FAMILY MEDICINE

## 2022-10-10 PROCEDURE — 1159F PR MEDICATION LIST DOCUMENTED IN MEDICAL RECORD: ICD-10-PCS | Mod: ,,, | Performed by: FAMILY MEDICINE

## 2022-10-10 PROCEDURE — 3008F PR BODY MASS INDEX (BMI) DOCUMENTED: ICD-10-PCS | Mod: ,,, | Performed by: FAMILY MEDICINE

## 2022-10-10 PROCEDURE — 3008F BODY MASS INDEX DOCD: CPT | Mod: ,,, | Performed by: FAMILY MEDICINE

## 2022-10-10 PROCEDURE — 99213 PR OFFICE/OUTPT VISIT, EST, LEVL III, 20-29 MIN: ICD-10-PCS | Mod: S$PBB,,, | Performed by: FAMILY MEDICINE

## 2022-10-10 PROCEDURE — 1160F PR REVIEW ALL MEDS BY PRESCRIBER/CLIN PHARMACIST DOCUMENTED: ICD-10-PCS | Mod: ,,, | Performed by: FAMILY MEDICINE

## 2022-10-10 PROCEDURE — 3044F HG A1C LEVEL LT 7.0%: CPT | Mod: ,,, | Performed by: FAMILY MEDICINE

## 2022-10-10 PROCEDURE — 1160F RVW MEDS BY RX/DR IN RCRD: CPT | Mod: ,,, | Performed by: FAMILY MEDICINE

## 2022-10-10 PROCEDURE — 99213 OFFICE O/P EST LOW 20 MIN: CPT | Mod: S$PBB,,, | Performed by: FAMILY MEDICINE

## 2022-10-10 PROCEDURE — 3044F PR MOST RECENT HEMOGLOBIN A1C LEVEL <7.0%: ICD-10-PCS | Mod: ,,, | Performed by: FAMILY MEDICINE

## 2022-10-10 PROCEDURE — 99213 OFFICE O/P EST LOW 20 MIN: CPT | Mod: PBBFAC | Performed by: FAMILY MEDICINE

## 2022-10-10 NOTE — PROCEDURES
Procedures  Patient presents to clinic for 1st F2F.  Patient's ESS is 7.  Patient wears FFM with non-heated tubing.  Patient gets supplies through [facility] in [place].
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
## 2022-11-11 ENCOUNTER — OFFICE VISIT (OUTPATIENT)
Dept: FAMILY MEDICINE | Facility: CLINIC | Age: 42
End: 2022-11-11
Payer: COMMERCIAL

## 2022-11-11 VITALS
HEIGHT: 69 IN | DIASTOLIC BLOOD PRESSURE: 76 MMHG | BODY MASS INDEX: 33.33 KG/M2 | SYSTOLIC BLOOD PRESSURE: 126 MMHG | OXYGEN SATURATION: 97 % | WEIGHT: 225 LBS | HEART RATE: 83 BPM | TEMPERATURE: 98 F

## 2022-11-11 DIAGNOSIS — M54.2 CERVICALGIA: ICD-10-CM

## 2022-11-11 DIAGNOSIS — Z23 ENCOUNTER FOR IMMUNIZATION: ICD-10-CM

## 2022-11-11 DIAGNOSIS — M54.12 CERVICAL RADICULOPATHY: ICD-10-CM

## 2022-11-11 DIAGNOSIS — F90.9 ATTENTION DEFICIT HYPERACTIVITY DISORDER (ADHD), UNSPECIFIED ADHD TYPE: Primary | ICD-10-CM

## 2022-11-11 PROBLEM — G56.92 NEUROPATHY, ARM, LEFT: Status: ACTIVE | Noted: 2022-11-11

## 2022-11-11 PROBLEM — G56.92 NEUROPATHY, ARM, LEFT: Status: RESOLVED | Noted: 2022-11-11 | Resolved: 2022-11-11

## 2022-11-11 PROCEDURE — 3044F HG A1C LEVEL LT 7.0%: CPT | Mod: ,,, | Performed by: NURSE PRACTITIONER

## 2022-11-11 PROCEDURE — 3078F DIAST BP <80 MM HG: CPT | Mod: ,,, | Performed by: NURSE PRACTITIONER

## 2022-11-11 PROCEDURE — 3008F BODY MASS INDEX DOCD: CPT | Mod: ,,, | Performed by: NURSE PRACTITIONER

## 2022-11-11 PROCEDURE — 90686 FLU VACCINE (QUAD) GREATER THAN OR EQUAL TO 3YO PRESERVATIVE FREE IM: ICD-10-PCS | Mod: ,,, | Performed by: NURSE PRACTITIONER

## 2022-11-11 PROCEDURE — 1160F PR REVIEW ALL MEDS BY PRESCRIBER/CLIN PHARMACIST DOCUMENTED: ICD-10-PCS | Mod: ,,, | Performed by: NURSE PRACTITIONER

## 2022-11-11 PROCEDURE — 1159F PR MEDICATION LIST DOCUMENTED IN MEDICAL RECORD: ICD-10-PCS | Mod: ,,, | Performed by: NURSE PRACTITIONER

## 2022-11-11 PROCEDURE — 3008F PR BODY MASS INDEX (BMI) DOCUMENTED: ICD-10-PCS | Mod: ,,, | Performed by: NURSE PRACTITIONER

## 2022-11-11 PROCEDURE — 90686 IIV4 VACC NO PRSV 0.5 ML IM: CPT | Mod: ,,, | Performed by: NURSE PRACTITIONER

## 2022-11-11 PROCEDURE — 99213 OFFICE O/P EST LOW 20 MIN: CPT | Mod: 25,,, | Performed by: NURSE PRACTITIONER

## 2022-11-11 PROCEDURE — 96372 THER/PROPH/DIAG INJ SC/IM: CPT | Mod: 59,,, | Performed by: NURSE PRACTITIONER

## 2022-11-11 PROCEDURE — 3044F PR MOST RECENT HEMOGLOBIN A1C LEVEL <7.0%: ICD-10-PCS | Mod: ,,, | Performed by: NURSE PRACTITIONER

## 2022-11-11 PROCEDURE — 3074F PR MOST RECENT SYSTOLIC BLOOD PRESSURE < 130 MM HG: ICD-10-PCS | Mod: ,,, | Performed by: NURSE PRACTITIONER

## 2022-11-11 PROCEDURE — 1160F RVW MEDS BY RX/DR IN RCRD: CPT | Mod: ,,, | Performed by: NURSE PRACTITIONER

## 2022-11-11 PROCEDURE — 99213 PR OFFICE/OUTPT VISIT, EST, LEVL III, 20-29 MIN: ICD-10-PCS | Mod: 25,,, | Performed by: NURSE PRACTITIONER

## 2022-11-11 PROCEDURE — 90471 FLU VACCINE (QUAD) GREATER THAN OR EQUAL TO 3YO PRESERVATIVE FREE IM: ICD-10-PCS | Mod: ,,, | Performed by: NURSE PRACTITIONER

## 2022-11-11 PROCEDURE — 90471 IMMUNIZATION ADMIN: CPT | Mod: ,,, | Performed by: NURSE PRACTITIONER

## 2022-11-11 PROCEDURE — 3078F PR MOST RECENT DIASTOLIC BLOOD PRESSURE < 80 MM HG: ICD-10-PCS | Mod: ,,, | Performed by: NURSE PRACTITIONER

## 2022-11-11 PROCEDURE — 3074F SYST BP LT 130 MM HG: CPT | Mod: ,,, | Performed by: NURSE PRACTITIONER

## 2022-11-11 PROCEDURE — 1159F MED LIST DOCD IN RCRD: CPT | Mod: ,,, | Performed by: NURSE PRACTITIONER

## 2022-11-11 PROCEDURE — 96372 PR INJECTION,THERAP/PROPH/DIAG2ST, IM OR SUBCUT: ICD-10-PCS | Mod: 59,,, | Performed by: NURSE PRACTITIONER

## 2022-11-11 RX ORDER — KETOROLAC TROMETHAMINE 30 MG/ML
60 INJECTION, SOLUTION INTRAMUSCULAR; INTRAVENOUS
Status: COMPLETED | OUTPATIENT
Start: 2022-11-11 | End: 2022-11-11

## 2022-11-11 RX ORDER — DEXTROAMPHETAMINE SACCHARATE, AMPHETAMINE ASPARTATE, DEXTROAMPHETAMINE SULFATE AND AMPHETAMINE SULFATE 5; 5; 5; 5 MG/1; MG/1; MG/1; MG/1
1 TABLET ORAL 2 TIMES DAILY
Qty: 60 TABLET | Refills: 0 | Status: SHIPPED | OUTPATIENT
Start: 2022-11-11 | End: 2022-12-29 | Stop reason: SDUPTHER

## 2022-11-11 RX ADMIN — KETOROLAC TROMETHAMINE 60 MG: 30 INJECTION, SOLUTION INTRAMUSCULAR; INTRAVENOUS at 02:11

## 2022-11-11 NOTE — PROGRESS NOTES
Rocio Talamantes NP   [id number] [facility] [address]  [place]  [address]      PATIENT NAME: Hemanth Cottrell  : 1980  DATE: 22  MRN: [id number]      Billing Provider: Rocio Talamantes NP  Level of Service:   Patient PCP Information       Provider PCP Type    Rocio Talamantes NP General            Reason for Visit / Chief Complaint: Medication Refill ( Refill and possible increase ./)       Update PCP  Update Chief Complaint         History of Present Illness / Problem Focused Workflow     Hemanth Cottrell presents to the clinic with Medication Refill ( Refill and possible increase ./)     42 year old male presents to clinic for refill of ADHD and is requesting increase of dosage. Patient reports the Adderall 10mg BID has not been enough to keep focused and on task at work. He has continued to have symptoms of ADHD including inability to focus, being easily distracted, and constantly fidgeting. He admits he has actually already increased himself to Adderall 20mg BID while at work and then does not take medications while he is off so that he can make them last. Patient has additional complaint of neck pain and numbness/tingling down left arm. He states he wakes up sometimes at night and this arm is completely asleep and he has to reach over with other arm to lift it. He reports tingling extends from spine all the way to fingers of left hand and reports all fingers are affected. 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           Review of Systems     @Review of Systems   Constitutional:  Negative for activity change, appetite change, fatigue and fever.   HENT:  Negative for nasal congestion, ear pain, rhinorrhea, sinus pressure/congestion and sore throat.    Eyes:  Negative for pain, redness, visual disturbance and eye dryness.   Respiratory:  Negative for cough and shortness of breath.    Cardiovascular:  Negative for chest pain and leg  swelling.   Gastrointestinal:  Negative for abdominal distention, abdominal pain, constipation and diarrhea.   Endocrine: Negative for cold intolerance, heat intolerance and polyuria.   Genitourinary:  Negative for bladder incontinence, dysuria, frequency and urgency.   Musculoskeletal:  Positive for neck pain. Negative for gait problem and myalgias.   Integumentary:  Negative for color change, rash and wound.   Allergic/Immunologic: Negative for environmental allergies and food allergies.   Neurological:  Positive for weakness and numbness. Negative for dizziness, light-headedness and headaches.   Psychiatric/Behavioral:  Positive for decreased concentration. Negative for behavioral problems and sleep disturbance. The patient is nervous/anxious and is hyperactive.      Medical / Social / Family History     Past Medical History:   Diagnosis Date    Achilles tendinitis     Allergic rhinitis     Anxiety disorder     COVID-19 08/2021    x 2 (02/2021)    Depressive disorder     Recurrent genital herpes simplex type 2 infection     Taking multiple medications for chronic disease     Urinary retention 5/5/2022    PVR per ED 5/1/22 337 ml       Past Surgical History:   Procedure Laterality Date    KNEE ARTHROSCOPY Left 2000       Social History    reports that he has never smoked. He has never been exposed to tobacco smoke. He has never used smokeless tobacco. He reports current alcohol use. He reports that he does not use drugs.    Family History  's family history includes Diabetes in his father and mother; Lung cancer in his father; No Known Problems in his brother and sister.    Medications and Allergies     Medications  Outpatient Medications Marked as Taking for the 11/11/22 encounter (Office Visit) with Rocio Talamantes NP   Medication Sig Dispense Refill    dextroamphetamine-amphetamine 10 mg Tab Take 1 tablet (10 mg total) by mouth 2 (two) times daily. 60 tablet 0    ergocalciferol (ERGOCALCIFEROL) 50,000  unit Cap Take 1 capsule (50,000 Units total) by mouth every 7 days. 4 capsule 6    valACYclovir (VALTREX) 500 MG tablet Take 1 tablet (500 mg total) by mouth 2 (two) times daily. 60 tablet 5       Allergies  Review of patient's allergies indicates:  No Known Allergies    Physical Examination     Vitals:    11/11/22 1349   BP: 126/76   Pulse: 83   Temp: 98.3 °F (36.8 °C)     Physical Exam  Vitals and nursing note reviewed.   HENT:      Head: Normocephalic.      Right Ear: Tympanic membrane normal.      Left Ear: Tympanic membrane normal.      Nose: Nose normal.      Mouth/Throat:      Mouth: Mucous membranes are moist.      Pharynx: Oropharynx is clear. No posterior oropharyngeal erythema.   Eyes:      Conjunctiva/sclera: Conjunctivae normal.   Neck:      Vascular: No carotid bruit.   Cardiovascular:      Rate and Rhythm: Normal rate and regular rhythm.      Heart sounds: Normal heart sounds.   Pulmonary:      Effort: Pulmonary effort is normal.      Breath sounds: Normal breath sounds.   Abdominal:      General: Abdomen is flat. Bowel sounds are normal. There is no distension.      Palpations: Abdomen is soft.   Musculoskeletal:         General: No swelling.      Left shoulder: Tenderness present.      Cervical back: Pain with movement, spinous process tenderness and muscular tenderness present.      Right lower leg: No edema.      Left lower leg: No edema.   Lymphadenopathy:      Cervical: No cervical adenopathy.   Skin:     General: Skin is warm and dry.   Neurological:      Mental Status: He is alert. Mental status is at baseline.      Sensory: Sensation is intact.      Motor: Motor function is intact.      Coordination: Coordination is intact.      Deep Tendon Reflexes:      Reflex Scores:       Tricep reflexes are 2+ on the right side and 2+ on the left side.       Bicep reflexes are 2+ on the right side and 2+ on the left side.       Brachioradialis reflexes are 2+ on the right side and 2+ on the left  side.  Psychiatric:         Attention and Perception: He is inattentive.         Mood and Affect: Mood is anxious.         Speech: Speech is rapid and pressured.         Behavior: Behavior is hyperactive.         Thought Content: Thought content does not include homicidal or suicidal ideation. Thought content does not include homicidal or suicidal plan.         Cognition and Memory: Cognition normal.         Judgment: Judgment normal.             Lab Results   Component Value Date    WBC 9.64 05/01/2022    HGB 15.6 05/01/2022    HCT 44.0 05/01/2022    MCV 89.4 05/01/2022     05/01/2022          Sodium   Date Value Ref Range Status   05/01/2022 139 136 - 145 mmol/L Final     Potassium   Date Value Ref Range Status   05/01/2022 3.8 3.5 - 5.1 mmol/L Final     Chloride   Date Value Ref Range Status   05/01/2022 103 98 - 107 mmol/L Final     CO2   Date Value Ref Range Status   05/01/2022 25 21 - 32 mmol/L Final     Glucose   Date Value Ref Range Status   05/01/2022 100 74 - 106 mg/dL Final     BUN   Date Value Ref Range Status   05/01/2022 8 7 - 18 mg/dL Final     Creatinine   Date Value Ref Range Status   05/01/2022 0.96 0.70 - 1.30 mg/dL Final     Calcium   Date Value Ref Range Status   05/01/2022 9.6 8.5 - 10.1 mg/dL Final     Total Protein   Date Value Ref Range Status   05/01/2022 7.7 6.4 - 8.2 g/dL Final     Albumin   Date Value Ref Range Status   05/01/2022 3.9 3.5 - 5.0 g/dL Final     Bilirubin, Total   Date Value Ref Range Status   05/01/2022 1.1 0.0 - 1.2 mg/dL Final     Alk Phos   Date Value Ref Range Status   05/01/2022 65 45 - 115 U/L Final     AST   Date Value Ref Range Status   05/01/2022 20 15 - 37 U/L Final     ALT   Date Value Ref Range Status   05/01/2022 25 16 - 61 U/L Final     Anion Gap   Date Value Ref Range Status   05/01/2022 15 7 - 16 mmol/L Final     eGFR   Date Value Ref Range Status   05/01/2022 90 >=60 mL/min/1.73m² Final      X-Ray Chest PA And Lateral  Narrative: EXAMINATION:  XR  CHEST PA AND LATERAL    CLINICAL HISTORY:  Other chest pain    TECHNIQUE:  PA and lateral views of the chest were performed.    COMPARISON:  05/01/2022    FINDINGS:  Heart size normal.  Lungs clear.  No pneumothorax or pleural effusion.  Pulmonary vessels show normal caliber.  Bones intact.  Impression: No acute findings.    Electronically signed by: Armando Bales  Date:    05/03/2022  Time:    07:43     Procedures   Assessment and Plan (including Health Maintenance)      Problem List  Smart Sets  Document Outside HM   :    Plan:           Problem List Items Addressed This Visit    None  Visit Diagnoses       Encounter for immunization    -  Primary            Health Maintenance Topics with due status: Not Due       Topic Last Completion Date    TETANUS VACCINE 01/21/2022    Lipid Panel 02/07/2022       Future Appointments   Date Time Provider Department Center   12/16/2022  1:30 PM Rocio Talamantes NP [facility]   12/19/2022  1:45 PM Ricardo Trevizo DO [facility]        Health Maintenance Due   Topic Date Due    Hepatitis C Screening  Never done    COVID-19 Vaccine (1) Never done        No follow-ups on file.     Signature:  Rocio Talamantes NP  [address]  [address]    Date of encounter: 11/11/22

## 2022-11-12 NOTE — ASSESSMENT & PLAN NOTE
Neck pain with associated radiculopathy extending from neck down left arm all the way to fingers of left hand. Toradol given IM today in clinic to help with inflammation and in turn improve radiculopathy. Will set up for CT to assess for disc problems or nerve impingement.

## 2022-11-12 NOTE — ASSESSMENT & PLAN NOTE
Will increase Adderall to 20mg BID as his symptoms are uncontrolled on 10mg BID. ADHD testing within last 7 years,  checked and is ok. NO signs of abuse, no aberrant behavior noted. Instructed patient to take meds as ordered. Follow up in 1 month to discuss if this regimen keeps him better controlled.

## 2022-11-12 NOTE — ASSESSMENT & PLAN NOTE
Neck pain with associated radiculopathy extending from neck down left arm all the way to fingers of left hand. Toradol given IM today in clinic to help with inflammation and in turn improve neuropathy. Will set up for CT to assess for disc problems or nerve impingement. Patient has ibuprofen 800mg at home he has been using as needed for pain.    Patient was instructed to rest, take medications as directed, alternate ice/moist heat to area, and monitor for worsening symptoms that require immediate evaluation. Patient was instructed to follow up if symptoms persist past current treatment plan to discuss further options, such as physical therapy, referral to ortho or other diagnostic imaging. Medication side effects/risk/benefits/directions on taking medications were reviewed with patient. Patient verbalized understanding of treatment plan and denies any questions.

## 2022-11-12 NOTE — ASSESSMENT & PLAN NOTE
Neck pain with associated neuropathy extending from neck down left arm all the way to fingers of left hand. Toradol given IM today in clinic to help with inflammation and in turn improve neuropathy. Will set up for CT to assess for disc problems or nerve impingement.

## 2022-12-16 ENCOUNTER — TELEPHONE (OUTPATIENT)
Dept: SLEEP MEDICINE | Facility: CLINIC | Age: 42
End: 2022-12-16
Payer: COMMERCIAL

## 2022-12-19 ENCOUNTER — OFFICE VISIT (OUTPATIENT)
Dept: SLEEP MEDICINE | Facility: CLINIC | Age: 42
End: 2022-12-19
Attending: FAMILY MEDICINE
Payer: COMMERCIAL

## 2022-12-19 VITALS
BODY MASS INDEX: 31.52 KG/M2 | SYSTOLIC BLOOD PRESSURE: 148 MMHG | DIASTOLIC BLOOD PRESSURE: 86 MMHG | WEIGHT: 225.19 LBS | OXYGEN SATURATION: 100 % | HEIGHT: 71 IN | HEART RATE: 87 BPM

## 2022-12-19 DIAGNOSIS — G47.33 OSA ON CPAP: Primary | ICD-10-CM

## 2022-12-19 PROCEDURE — 1160F PR REVIEW ALL MEDS BY PRESCRIBER/CLIN PHARMACIST DOCUMENTED: ICD-10-PCS | Mod: ,,, | Performed by: FAMILY MEDICINE

## 2022-12-19 PROCEDURE — 3008F PR BODY MASS INDEX (BMI) DOCUMENTED: ICD-10-PCS | Mod: ,,, | Performed by: FAMILY MEDICINE

## 2022-12-19 PROCEDURE — 3077F SYST BP >= 140 MM HG: CPT | Mod: ,,, | Performed by: FAMILY MEDICINE

## 2022-12-19 PROCEDURE — 3079F DIAST BP 80-89 MM HG: CPT | Mod: ,,, | Performed by: FAMILY MEDICINE

## 2022-12-19 PROCEDURE — 3044F PR MOST RECENT HEMOGLOBIN A1C LEVEL <7.0%: ICD-10-PCS | Mod: ,,, | Performed by: FAMILY MEDICINE

## 2022-12-19 PROCEDURE — 1159F MED LIST DOCD IN RCRD: CPT | Mod: ,,, | Performed by: FAMILY MEDICINE

## 2022-12-19 PROCEDURE — 99212 PR OFFICE/OUTPT VISIT, EST, LEVL II, 10-19 MIN: ICD-10-PCS | Mod: S$PBB,,, | Performed by: FAMILY MEDICINE

## 2022-12-19 PROCEDURE — 3044F HG A1C LEVEL LT 7.0%: CPT | Mod: ,,, | Performed by: FAMILY MEDICINE

## 2022-12-19 PROCEDURE — 1160F RVW MEDS BY RX/DR IN RCRD: CPT | Mod: ,,, | Performed by: FAMILY MEDICINE

## 2022-12-19 PROCEDURE — 3008F BODY MASS INDEX DOCD: CPT | Mod: ,,, | Performed by: FAMILY MEDICINE

## 2022-12-19 PROCEDURE — 99212 OFFICE O/P EST SF 10 MIN: CPT | Mod: S$PBB,,, | Performed by: FAMILY MEDICINE

## 2022-12-19 PROCEDURE — 99213 OFFICE O/P EST LOW 20 MIN: CPT | Mod: PBBFAC | Performed by: FAMILY MEDICINE

## 2022-12-19 PROCEDURE — 3079F PR MOST RECENT DIASTOLIC BLOOD PRESSURE 80-89 MM HG: ICD-10-PCS | Mod: ,,, | Performed by: FAMILY MEDICINE

## 2022-12-19 PROCEDURE — 1159F PR MEDICATION LIST DOCUMENTED IN MEDICAL RECORD: ICD-10-PCS | Mod: ,,, | Performed by: FAMILY MEDICINE

## 2022-12-19 PROCEDURE — 3077F PR MOST RECENT SYSTOLIC BLOOD PRESSURE >= 140 MM HG: ICD-10-PCS | Mod: ,,, | Performed by: FAMILY MEDICINE

## 2022-12-19 NOTE — PROGRESS NOTES
Subjective:       Patient ID: Hemanth Cottrell is a 42 y.o. male.    Chief Complaint: Sleep Apnea (CPAP management)    Patient follows up in sleep clinic for CPAP management having no problems with his mask or machine.  Compliance is 87% with an average AHI of 1.1 at a pressure of 9 cm H2O.  The patient has an [place] score of 8 and is using and benefitting from CPAP.  Patient uses a fullface mask and had an initial AHI of 6.    Review of Systems   Constitutional:  Negative for fatigue.        Negative EDS   Respiratory:  Negative for apnea.    Psychiatric/Behavioral:  Negative for sleep disturbance.        Objective:      Physical Exam  Cardiovascular:      Rate and Rhythm: Normal rate and regular rhythm.      Heart sounds: No murmur heard.  Pulmonary:      Breath sounds: Normal breath sounds.   Skin:     General: Skin is warm and dry.   Neurological:      Mental Status: He is alert and oriented to person, place, and time.       Assessment:       Problem List Items Addressed This Visit          Other    ISELA on CPAP - Primary         Plan:       Continue CPAP @ 9 cm H20  Caution while operating a motor vehicle  Prescription for new supplies  Follow up sleep clinic in 1 year.

## 2022-12-19 NOTE — PROGRESS NOTES
Patient presents to clinic for a CPAP follow up. ESS is 8. Patient gets his supplies from [facility] in [place]. Patient wears a full face mask. Patient states he may need a larger size head gear.

## 2022-12-19 NOTE — PROGRESS NOTES
Subjective:       Patient ID: Hemanth Cottrell is a 42 y.o. male.    Chief Complaint: Sleep Apnea (CPAP management)    HPI  Review of Systems      Objective:      Physical Exam    Assessment:       Problem List Items Addressed This Visit          Other    ISELA on CPAP - Primary         Plan:       ***

## 2022-12-27 ENCOUNTER — CLINICAL SUPPORT (OUTPATIENT)
Dept: PRIMARY CARE CLINIC | Facility: CLINIC | Age: 42
End: 2022-12-27

## 2022-12-27 DIAGNOSIS — Z11.1 SCREENING-PULMONARY TB: Primary | ICD-10-CM

## 2022-12-27 PROCEDURE — 92552 PURE TONE AUDIOMETRY AIR: CPT | Mod: ,,, | Performed by: NURSE PRACTITIONER

## 2022-12-27 PROCEDURE — 92552 PR PURE TONE AUDIOMETRY, AIR: ICD-10-PCS | Mod: ,,, | Performed by: NURSE PRACTITIONER

## 2022-12-27 PROCEDURE — 86580 PR  TB INTRADERMAL TEST: ICD-10-PCS | Mod: ,,, | Performed by: NURSE PRACTITIONER

## 2022-12-27 PROCEDURE — 86580 TB INTRADERMAL TEST: CPT | Mod: ,,, | Performed by: NURSE PRACTITIONER

## 2022-12-29 DIAGNOSIS — F90.9 ATTENTION DEFICIT HYPERACTIVITY DISORDER (ADHD), UNSPECIFIED ADHD TYPE: ICD-10-CM

## 2022-12-29 RX ORDER — DEXTROAMPHETAMINE SACCHARATE, AMPHETAMINE ASPARTATE, DEXTROAMPHETAMINE SULFATE AND AMPHETAMINE SULFATE 5; 5; 5; 5 MG/1; MG/1; MG/1; MG/1
1 TABLET ORAL 2 TIMES DAILY
Qty: 60 TABLET | Refills: 0 | Status: SHIPPED | OUTPATIENT
Start: 2022-12-29 | End: 2022-12-29 | Stop reason: SDUPTHER

## 2022-12-29 RX ORDER — DEXTROAMPHETAMINE SACCHARATE, AMPHETAMINE ASPARTATE, DEXTROAMPHETAMINE SULFATE AND AMPHETAMINE SULFATE 5; 5; 5; 5 MG/1; MG/1; MG/1; MG/1
1 TABLET ORAL 2 TIMES DAILY
Qty: 60 TABLET | Refills: 0 | Status: SHIPPED | OUTPATIENT
Start: 2022-12-29 | End: 2023-01-05 | Stop reason: SDUPTHER

## 2023-01-05 DIAGNOSIS — F90.9 ATTENTION DEFICIT HYPERACTIVITY DISORDER (ADHD), UNSPECIFIED ADHD TYPE: ICD-10-CM

## 2023-01-05 RX ORDER — DEXTROAMPHETAMINE SACCHARATE, AMPHETAMINE ASPARTATE, DEXTROAMPHETAMINE SULFATE AND AMPHETAMINE SULFATE 5; 5; 5; 5 MG/1; MG/1; MG/1; MG/1
1 TABLET ORAL 2 TIMES DAILY
Qty: 60 TABLET | Refills: 0 | Status: SHIPPED | OUTPATIENT
Start: 2023-01-05 | End: 2023-03-06 | Stop reason: SDUPTHER

## 2023-02-02 ENCOUNTER — OFFICE VISIT (OUTPATIENT)
Dept: FAMILY MEDICINE | Facility: CLINIC | Age: 43
End: 2023-02-02
Payer: COMMERCIAL

## 2023-02-02 VITALS
SYSTOLIC BLOOD PRESSURE: 138 MMHG | RESPIRATION RATE: 16 BRPM | DIASTOLIC BLOOD PRESSURE: 78 MMHG | BODY MASS INDEX: 32.62 KG/M2 | OXYGEN SATURATION: 99 % | HEART RATE: 87 BPM | WEIGHT: 233 LBS | HEIGHT: 71 IN | TEMPERATURE: 98 F

## 2023-02-02 DIAGNOSIS — J01.40 ACUTE NON-RECURRENT PANSINUSITIS: Primary | ICD-10-CM

## 2023-02-02 DIAGNOSIS — R05.9 COUGH, UNSPECIFIED TYPE: ICD-10-CM

## 2023-02-02 LAB
CTP QC/QA: YES
FLUAV AG NPH QL: NEGATIVE
FLUBV AG NPH QL: NEGATIVE
SARS-COV-2 AG RESP QL IA.RAPID: NEGATIVE

## 2023-02-02 PROCEDURE — 1160F PR REVIEW ALL MEDS BY PRESCRIBER/CLIN PHARMACIST DOCUMENTED: ICD-10-PCS | Mod: ,,, | Performed by: NURSE PRACTITIONER

## 2023-02-02 PROCEDURE — 1160F RVW MEDS BY RX/DR IN RCRD: CPT | Mod: ,,, | Performed by: NURSE PRACTITIONER

## 2023-02-02 PROCEDURE — 87428 SARSCOV & INF VIR A&B AG IA: CPT | Mod: QW,,, | Performed by: NURSE PRACTITIONER

## 2023-02-02 PROCEDURE — 99213 PR OFFICE/OUTPT VISIT, EST, LEVL III, 20-29 MIN: ICD-10-PCS | Mod: 25,,, | Performed by: NURSE PRACTITIONER

## 2023-02-02 PROCEDURE — 87428 POCT SARS-COV2 (COVID) WITH FLU ANTIGEN: ICD-10-PCS | Mod: QW,,, | Performed by: NURSE PRACTITIONER

## 2023-02-02 PROCEDURE — 3075F SYST BP GE 130 - 139MM HG: CPT | Mod: ,,, | Performed by: NURSE PRACTITIONER

## 2023-02-02 PROCEDURE — 3008F BODY MASS INDEX DOCD: CPT | Mod: ,,, | Performed by: NURSE PRACTITIONER

## 2023-02-02 PROCEDURE — 3008F PR BODY MASS INDEX (BMI) DOCUMENTED: ICD-10-PCS | Mod: ,,, | Performed by: NURSE PRACTITIONER

## 2023-02-02 PROCEDURE — 1159F PR MEDICATION LIST DOCUMENTED IN MEDICAL RECORD: ICD-10-PCS | Mod: ,,, | Performed by: NURSE PRACTITIONER

## 2023-02-02 PROCEDURE — 96372 THER/PROPH/DIAG INJ SC/IM: CPT | Mod: ,,, | Performed by: NURSE PRACTITIONER

## 2023-02-02 PROCEDURE — 1159F MED LIST DOCD IN RCRD: CPT | Mod: ,,, | Performed by: NURSE PRACTITIONER

## 2023-02-02 PROCEDURE — 96372 PR INJECTION,THERAP/PROPH/DIAG2ST, IM OR SUBCUT: ICD-10-PCS | Mod: ,,, | Performed by: NURSE PRACTITIONER

## 2023-02-02 PROCEDURE — 3078F DIAST BP <80 MM HG: CPT | Mod: ,,, | Performed by: NURSE PRACTITIONER

## 2023-02-02 PROCEDURE — 3075F PR MOST RECENT SYSTOLIC BLOOD PRESS GE 130-139MM HG: ICD-10-PCS | Mod: ,,, | Performed by: NURSE PRACTITIONER

## 2023-02-02 PROCEDURE — 99213 OFFICE O/P EST LOW 20 MIN: CPT | Mod: 25,,, | Performed by: NURSE PRACTITIONER

## 2023-02-02 PROCEDURE — 3078F PR MOST RECENT DIASTOLIC BLOOD PRESSURE < 80 MM HG: ICD-10-PCS | Mod: ,,, | Performed by: NURSE PRACTITIONER

## 2023-02-02 RX ORDER — CETIRIZINE HYDROCHLORIDE, PSEUDOEPHEDRINE HYDROCHLORIDE 5; 120 MG/1; MG/1
1 TABLET, FILM COATED, EXTENDED RELEASE ORAL 2 TIMES DAILY
Qty: 30 TABLET | Refills: 0 | Status: SHIPPED | OUTPATIENT
Start: 2023-02-02 | End: 2023-02-12

## 2023-02-02 RX ORDER — AMOXICILLIN AND CLAVULANATE POTASSIUM 875; 125 MG/1; MG/1
1 TABLET, FILM COATED ORAL EVERY 12 HOURS
Qty: 20 TABLET | Refills: 0 | Status: SHIPPED | OUTPATIENT
Start: 2023-02-02 | End: 2023-04-19 | Stop reason: ALTCHOICE

## 2023-02-02 RX ORDER — DEXAMETHASONE SODIUM PHOSPHATE 4 MG/ML
4 INJECTION, SOLUTION INTRA-ARTICULAR; INTRALESIONAL; INTRAMUSCULAR; INTRAVENOUS; SOFT TISSUE
Status: COMPLETED | OUTPATIENT
Start: 2023-02-02 | End: 2023-02-02

## 2023-02-02 RX ORDER — CEFTRIAXONE 1 G/1
1 INJECTION, POWDER, FOR SOLUTION INTRAMUSCULAR; INTRAVENOUS
Status: COMPLETED | OUTPATIENT
Start: 2023-02-02 | End: 2023-02-02

## 2023-02-02 RX ORDER — METHYLPREDNISOLONE 4 MG/1
TABLET ORAL
Qty: 21 EACH | Refills: 0 | Status: SHIPPED | OUTPATIENT
Start: 2023-02-02 | End: 2023-02-23

## 2023-02-02 RX ADMIN — DEXAMETHASONE SODIUM PHOSPHATE 4 MG: 4 INJECTION, SOLUTION INTRA-ARTICULAR; INTRALESIONAL; INTRAMUSCULAR; INTRAVENOUS; SOFT TISSUE at 01:02

## 2023-02-02 RX ADMIN — CEFTRIAXONE 1 G: 1 INJECTION, POWDER, FOR SOLUTION INTRAMUSCULAR; INTRAVENOUS at 01:02

## 2023-02-02 NOTE — PROGRESS NOTES
02/02/23 1305   Depression Patient Health Questionnaire (PHQ-2)   Over the last two weeks how often have you been bothered by little interest or pleasure in doing things 0   Over the last two weeks how often have you been bothered by feeling down, depressed or hopeless 0   PHQ-2 Total Score 0   Depression Patient Health Questionnaire (PHQ-9)   Over the last two weeks how often have you been bothered by trouble falling or staying asleep, or sleeping too much 1   Over the last two weeks how often have you been bothered by feeling tired or having little energy 1   Over the last two weeks how often have you been bothered by a poor appetite or overeating 0   Over the last two weeks how often have you been bothered by feeling bad about yourself - or that you are a failure or have let yourself or your family down 0   Over the last two weeks how often have you been bothered by trouble concentrating on things, such as reading the newspaper or watching television 0   Over the last two weeks how often have you been bothered by moving or speaking so slowly that other people could have noticed. Or the opposite - being so fidgety or restless that you have been moving around a lot more than usual. 0   Over the last two weeks how often have you been bothered by thoughts that you would be better off dead, or of hurting yourself 0   If you checked off any problems, how difficult have these problems made it for you to do your work, take care of things at home or get along with other people? Not difficult at all   PHQ-9 Score 2   PHQ-9 Interpretation Minimal or None

## 2023-02-02 NOTE — PROGRESS NOTES
Rocio Talamantes NP   [facility]  [address]  [place]  [address]      PATIENT NAME: Hemanth Cottrell  : 1980  DATE: 23  MRN: [id number]      Billing Provider: Rocio Talamantes NP  Level of Service: [place] OFFICE/OUTPT VISIT, EST, LEVL III, 20-29 MIN  Patient PCP Information       Provider PCP Type    Rocio Talamantes NP General            Reason for Visit / Chief Complaint: Cough (Productive cough at times. Yellow/greenish sputum. Not able to sleep at night due to drainage. Started 3-4 days ago. ), Nasal Congestion (Started 3-4 days ago), Headache (Started 3-4 days ago), and Sore Throat (From coughing. )       Update PCP  Update Chief Complaint         History of Present Illness / Problem Focused Workflow     Hemanth Cottrell presents to the clinic with Cough (Productive cough at times. Yellow/greenish sputum. Not able to sleep at night due to drainage. Started 3-4 days ago. ), Nasal Congestion (Started 3-4 days ago), Headache (Started 3-4 days ago), and Sore Throat (From coughing. )     43 year old male presents to clinic with complaints of productive cough with yellow/greenish sputum, nasal congestion, headache, and sore throat that has been going on for 3-4 days. Denies fever. Denies known sick contacts. States he has taken Mucinex DM and OTC Sudafed at home with little relief.    Review of Systems     @Review of Systems   Constitutional:  Negative for activity change, appetite change, fatigue and fever.   HENT:  Positive for nasal congestion, rhinorrhea, sinus pressure/congestion and sore throat. Negative for ear pain.    Eyes:  Negative for pain, redness, visual disturbance and eye dryness.   Respiratory:  Positive for cough. Negative for shortness of breath.    Cardiovascular:  Negative for chest pain and leg swelling.   Gastrointestinal:  Negative for abdominal distention, abdominal pain, constipation and diarrhea.   Endocrine: Negative for cold intolerance, heat  intolerance and polyuria.   Genitourinary:  Negative for bladder incontinence, dysuria, frequency and urgency.   Musculoskeletal:  Negative for arthralgias, gait problem and myalgias.   Integumentary:  Negative for color change, rash and wound.   Allergic/Immunologic: Negative for environmental allergies and food allergies.   Neurological:  Negative for dizziness, weakness, light-headedness and headaches.   Psychiatric/Behavioral:  Negative for behavioral problems and sleep disturbance.      Medical / Social / Family History     Past Medical History:   Diagnosis Date    Achilles tendinitis     Allergic rhinitis     Anxiety disorder     COVID-19 08/2021    x 2 (02/2021)    Depressive disorder     Recurrent genital herpes simplex type 2 infection     Taking multiple medications for chronic disease     Urinary retention 5/5/2022    PVR per ED 5/1/22 337 ml       Past Surgical History:   Procedure Laterality Date    KNEE ARTHROSCOPY Left 2000       Social History    reports that he has never smoked. He has never been exposed to tobacco smoke. He has never used smokeless tobacco. He reports current alcohol use. He reports that he does not use drugs.    Family History  's family history includes Diabetes in his father, maternal grandmother, and mother; Hypertension in his maternal grandmother; Lung cancer in his paternal grandfather; No Known Problems in his brother, daughter, daughter, daughter, maternal grandfather, paternal grandmother, sister, and son.    Medications and Allergies     Medications  Outpatient Medications Marked as Taking for the 2/2/23 encounter (Office Visit) with Rocio Talamantes NP   Medication Sig Dispense Refill    ergocalciferol (ERGOCALCIFEROL) 50,000 unit Cap Take 1 capsule (50,000 Units total) by mouth every 7 days. 4 capsule 6    valACYclovir (VALTREX) 500 MG tablet Take 1 tablet (500 mg total) by mouth 2 (two) times daily. 60 tablet 5    [DISCONTINUED] dextroamphetamine-amphetamine  (ADDERALL) 20 mg tablet Take 1 tablet by mouth 2 (two) times a day. 60 tablet 0       Allergies  Review of patient's allergies indicates:  No Known Allergies    Physical Examination     Vitals:    02/02/23 1300   BP: 138/78   Pulse: 87   Resp: 16   Temp: 98 °F (36.7 °C)     Physical Exam  Vitals and nursing note reviewed.   Constitutional:       Appearance: Normal appearance.   HENT:      Head: Normocephalic.      Right Ear: Tympanic membrane normal.      Left Ear: Tympanic membrane normal.      Nose: Congestion and rhinorrhea present. Rhinorrhea is purulent.      Right Turbinates: Pale.      Left Turbinates: Pale.      Right Sinus: Maxillary sinus tenderness and frontal sinus tenderness present.      Left Sinus: Maxillary sinus tenderness and frontal sinus tenderness present.      Mouth/Throat:      Lips: Pink.      Mouth: Mucous membranes are moist.      Pharynx: Oropharyngeal exudate (clear post nasal drainage.) and posterior oropharyngeal erythema present.   Eyes:      Conjunctiva/sclera: Conjunctivae normal.   Cardiovascular:      Rate and Rhythm: Normal rate and regular rhythm.      Pulses: Normal pulses.      Heart sounds: Normal heart sounds.   Pulmonary:      Effort: Pulmonary effort is normal.      Breath sounds: Normal breath sounds. No wheezing or rhonchi.   Abdominal:      General: Abdomen is flat. Bowel sounds are normal. There is no distension.      Palpations: Abdomen is soft.      Tenderness: There is no abdominal tenderness.   Musculoskeletal:         General: Normal range of motion.      Cervical back: Normal range of motion.   Skin:     General: Skin is warm and dry.      Capillary Refill: Capillary refill takes less than 2 seconds.   Neurological:      General: No focal deficit present.      Mental Status: He is alert and oriented to person, place, and time. Mental status is at baseline.   Psychiatric:         Mood and Affect: Mood normal.         Behavior: Behavior normal.             Lab  Results   Component Value Date    WBC 9.64 05/01/2022    HGB 15.6 05/01/2022    HCT 44.0 05/01/2022    MCV 89.4 05/01/2022     05/01/2022          Sodium   Date Value Ref Range Status   05/01/2022 139 136 - 145 mmol/L Final     Potassium   Date Value Ref Range Status   05/01/2022 3.8 3.5 - 5.1 mmol/L Final     Chloride   Date Value Ref Range Status   05/01/2022 103 98 - 107 mmol/L Final     CO2   Date Value Ref Range Status   05/01/2022 25 21 - 32 mmol/L Final     Glucose   Date Value Ref Range Status   05/01/2022 100 74 - 106 mg/dL Final     BUN   Date Value Ref Range Status   05/01/2022 8 7 - 18 mg/dL Final     Creatinine   Date Value Ref Range Status   05/01/2022 0.96 0.70 - 1.30 mg/dL Final     Calcium   Date Value Ref Range Status   05/01/2022 9.6 8.5 - 10.1 mg/dL Final     Total Protein   Date Value Ref Range Status   05/01/2022 7.7 6.4 - 8.2 g/dL Final     Albumin   Date Value Ref Range Status   05/01/2022 3.9 3.5 - 5.0 g/dL Final     Bilirubin, Total   Date Value Ref Range Status   05/01/2022 1.1 0.0 - 1.2 mg/dL Final     Alk Phos   Date Value Ref Range Status   05/01/2022 65 45 - 115 U/L Final     AST   Date Value Ref Range Status   05/01/2022 20 15 - 37 U/L Final     ALT   Date Value Ref Range Status   05/01/2022 25 16 - 61 U/L Final     Anion Gap   Date Value Ref Range Status   05/01/2022 15 7 - 16 mmol/L Final     eGFR   Date Value Ref Range Status   05/01/2022 90 >=60 mL/min/1.73m² Final      X-Ray Chest PA And Lateral  Narrative: EXAMINATION:  XR CHEST PA AND LATERAL    CLINICAL HISTORY:  Other chest pain    TECHNIQUE:  PA and lateral views of the chest were performed.    COMPARISON:  05/01/2022    FINDINGS:  Heart size normal.  Lungs clear.  No pneumothorax or pleural effusion.  Pulmonary vessels show normal caliber.  Bones intact.  Impression: No acute findings.    Electronically signed by: Armando Ray  Date:    05/03/2022  Time:    07:43     Procedures   Assessment and Plan (including Health  Maintenance)      Problem List  Smart Sets  Document Outside HM   :    Plan:           Problem List Items Addressed This Visit          ENT    Acute non-recurrent pansinusitis - Primary    Current Assessment & Plan     Rocephin and Decadron given IM in clinic.   Augmentin and Medrol Dose Pack as ordered and Zyrtec D as needed.    Reviewed pathology of current symptoms, medication side effects/risk/benefits/directions on taking medications, and worsening or persistent symptoms that require follow up in next 2-3 days. Saline/steroid nasal sprays, antihistamine use, increase fluid intake, and multivitamin/elderberry/Zinc use were recommended. May take Tylenol or Motrin as needed for pain and/or fever. Patient was instructed to take antibiotic as directed, complete entire course to avoid antibiotic resistance, and take OTC probiotic with antibiotic to prevent GI upset. Patient verbalized understanding of treatment plan and denies any questions.         Relevant Medications    amoxicillin-clavulanate 875-125mg (AUGMENTIN) 875-125 mg per tablet    Other Relevant Orders    POCT SARS-COV2 (COVID) with Flu Antigen (Completed)     Other Visit Diagnoses       Cough, unspecified type        Relevant Orders    POCT SARS-COV2 (COVID) with Flu Antigen (Completed)            Health Maintenance Topics with due status: Not Due       Topic Last Completion Date    TETANUS VACCINE 01/21/2022    Hemoglobin A1c (Diabetic Prevention Screening) 02/07/2022    Lipid Panel 02/07/2022       Future Appointments   Date Time Provider Department Center   12/18/2023  1:30 PM Ricardo Trevizo DO [facility]        Health Maintenance Due   Topic Date Due    COVID-19 Vaccine (1) Never done    Colorectal Cancer Screening  Never done        Follow up in about 3 months (around 5/2/2023), or if symptoms worsen or fail to improve.     Signature:  Rocio Talamantes NP  [place] [address] [place]  [address]    Date of  encounter: 2/2/23

## 2023-02-08 ENCOUNTER — PATIENT OUTREACH (OUTPATIENT)
Dept: ADMINISTRATIVE | Facility: HOSPITAL | Age: 43
End: 2023-02-08

## 2023-02-08 NOTE — PROGRESS NOTES
Population Health review of encounter with date of service 02/02/23 with BUD Talamantes.  Added HM Due in Care Coordinator note.  Does not qualify for HY or CMH.  No HY scheduled.  Same day appt; not added to ON for scheduling.

## 2023-03-06 DIAGNOSIS — F90.9 ATTENTION DEFICIT HYPERACTIVITY DISORDER (ADHD), UNSPECIFIED ADHD TYPE: ICD-10-CM

## 2023-03-06 RX ORDER — DEXTROAMPHETAMINE SACCHARATE, AMPHETAMINE ASPARTATE, DEXTROAMPHETAMINE SULFATE AND AMPHETAMINE SULFATE 5; 5; 5; 5 MG/1; MG/1; MG/1; MG/1
1 TABLET ORAL 2 TIMES DAILY
Qty: 60 TABLET | Refills: 0 | Status: SHIPPED | OUTPATIENT
Start: 2023-03-06 | End: 2023-04-19 | Stop reason: SDUPTHER

## 2023-03-12 PROBLEM — J01.40 ACUTE NON-RECURRENT PANSINUSITIS: Status: ACTIVE | Noted: 2023-03-12

## 2023-03-12 NOTE — ASSESSMENT & PLAN NOTE
Rocephin and Decadron given IM in clinic.   Augmentin and Medrol Dose Pack as ordered and Zyrtec D as needed.    Reviewed pathology of current symptoms, medication side effects/risk/benefits/directions on taking medications, and worsening or persistent symptoms that require follow up in next 2-3 days. Saline/steroid nasal sprays, antihistamine use, increase fluid intake, and multivitamin/elderberry/Zinc use were recommended. May take Tylenol or Motrin as needed for pain and/or fever. Patient was instructed to take antibiotic as directed, complete entire course to avoid antibiotic resistance, and take OTC probiotic with antibiotic to prevent GI upset. Patient verbalized understanding of treatment plan and denies any questions.

## 2023-04-19 ENCOUNTER — OFFICE VISIT (OUTPATIENT)
Dept: FAMILY MEDICINE | Facility: CLINIC | Age: 43
End: 2023-04-19
Payer: COMMERCIAL

## 2023-04-19 VITALS
DIASTOLIC BLOOD PRESSURE: 86 MMHG | HEART RATE: 89 BPM | HEIGHT: 71 IN | TEMPERATURE: 98 F | WEIGHT: 220 LBS | SYSTOLIC BLOOD PRESSURE: 164 MMHG | BODY MASS INDEX: 30.8 KG/M2 | OXYGEN SATURATION: 99 % | RESPIRATION RATE: 18 BRPM

## 2023-04-19 DIAGNOSIS — B00.9 HERPES: ICD-10-CM

## 2023-04-19 DIAGNOSIS — F90.9 ATTENTION DEFICIT HYPERACTIVITY DISORDER (ADHD), UNSPECIFIED ADHD TYPE: ICD-10-CM

## 2023-04-19 DIAGNOSIS — E55.9 VITAMIN D DEFICIENCY, UNSPECIFIED: ICD-10-CM

## 2023-04-19 PROCEDURE — 3008F PR BODY MASS INDEX (BMI) DOCUMENTED: ICD-10-PCS | Mod: ,,, | Performed by: NURSE PRACTITIONER

## 2023-04-19 PROCEDURE — 1159F MED LIST DOCD IN RCRD: CPT | Mod: ,,, | Performed by: NURSE PRACTITIONER

## 2023-04-19 PROCEDURE — 3008F BODY MASS INDEX DOCD: CPT | Mod: ,,, | Performed by: NURSE PRACTITIONER

## 2023-04-19 PROCEDURE — 3079F DIAST BP 80-89 MM HG: CPT | Mod: ,,, | Performed by: NURSE PRACTITIONER

## 2023-04-19 PROCEDURE — 3077F PR MOST RECENT SYSTOLIC BLOOD PRESSURE >= 140 MM HG: ICD-10-PCS | Mod: ,,, | Performed by: NURSE PRACTITIONER

## 2023-04-19 PROCEDURE — 99213 PR OFFICE/OUTPT VISIT, EST, LEVL III, 20-29 MIN: ICD-10-PCS | Mod: ,,, | Performed by: NURSE PRACTITIONER

## 2023-04-19 PROCEDURE — 99213 OFFICE O/P EST LOW 20 MIN: CPT | Mod: ,,, | Performed by: NURSE PRACTITIONER

## 2023-04-19 PROCEDURE — 3079F PR MOST RECENT DIASTOLIC BLOOD PRESSURE 80-89 MM HG: ICD-10-PCS | Mod: ,,, | Performed by: NURSE PRACTITIONER

## 2023-04-19 PROCEDURE — 3077F SYST BP >= 140 MM HG: CPT | Mod: ,,, | Performed by: NURSE PRACTITIONER

## 2023-04-19 PROCEDURE — 1159F PR MEDICATION LIST DOCUMENTED IN MEDICAL RECORD: ICD-10-PCS | Mod: ,,, | Performed by: NURSE PRACTITIONER

## 2023-04-19 RX ORDER — DEXTROAMPHETAMINE SACCHARATE, AMPHETAMINE ASPARTATE, DEXTROAMPHETAMINE SULFATE AND AMPHETAMINE SULFATE 5; 5; 5; 5 MG/1; MG/1; MG/1; MG/1
1 TABLET ORAL 2 TIMES DAILY
Qty: 60 TABLET | Refills: 0 | Status: SHIPPED | OUTPATIENT
Start: 2023-04-19 | End: 2023-11-21 | Stop reason: SDUPTHER

## 2023-04-19 RX ORDER — DEXTROAMPHETAMINE SACCHARATE, AMPHETAMINE ASPARTATE, DEXTROAMPHETAMINE SULFATE AND AMPHETAMINE SULFATE 5; 5; 5; 5 MG/1; MG/1; MG/1; MG/1
1 TABLET ORAL 2 TIMES DAILY
Qty: 60 TABLET | Refills: 0 | Status: SHIPPED | OUTPATIENT
Start: 2023-04-19 | End: 2023-07-31 | Stop reason: SDUPTHER

## 2023-04-19 RX ORDER — VALACYCLOVIR HYDROCHLORIDE 500 MG/1
500 TABLET, FILM COATED ORAL 2 TIMES DAILY
Qty: 180 TABLET | Refills: 1 | Status: SHIPPED | OUTPATIENT
Start: 2023-04-19 | End: 2023-11-21 | Stop reason: SDUPTHER

## 2023-04-19 RX ORDER — DEXTROAMPHETAMINE SACCHARATE, AMPHETAMINE ASPARTATE, DEXTROAMPHETAMINE SULFATE AND AMPHETAMINE SULFATE 5; 5; 5; 5 MG/1; MG/1; MG/1; MG/1
1 TABLET ORAL 2 TIMES DAILY
Qty: 60 TABLET | Refills: 0 | Status: SHIPPED | OUTPATIENT
Start: 2023-04-19 | End: 2023-09-22 | Stop reason: SDUPTHER

## 2023-04-19 RX ORDER — ERGOCALCIFEROL 1.25 MG/1
50000 CAPSULE ORAL
Qty: 12 CAPSULE | Refills: 1 | Status: SHIPPED | OUTPATIENT
Start: 2023-04-19 | End: 2023-07-31 | Stop reason: SDUPTHER

## 2023-04-20 NOTE — PROGRESS NOTES
Rocio Talamantes NP   [place] [id number] [facility] [address]  [place]  [address]      PATIENT NAME: Hemanth Cottrell  : 1980  DATE: 23  MRN: [id number]      Billing Provider: Rocio Talamantes NP  Level of Service: [place] OFFICE/OUTPT VISIT, EST, LEVL III, 20-29 MIN  Patient PCP Information       Provider PCP Type    Rocio Talamantes NP General            Reason for Visit / Chief Complaint: ADHD (Routine visit for adderall refill. ), Vitamin D Deficiency (Routine visit for med refill), and Herpes Zoster (Routine visit for med refill)         History of Present Illness / Problem Focused Workflow     Hemanth Cottrell presents to the clinic with ADHD (Routine visit for adderall refill. ), Vitamin D Deficiency (Routine visit for med refill), and Herpes Zoster (Routine visit for med refill)     43 year old male presents to clinic for check up and medication refills. HE states he is doing well and denies any problems.      Review of Systems     @Review of Systems   Constitutional:  Negative for activity change, appetite change, fatigue and fever.   HENT:  Negative for nasal congestion, ear pain, rhinorrhea, sinus pressure/congestion and sore throat.    Eyes:  Negative for pain, redness, visual disturbance and eye dryness.   Respiratory:  Negative for cough and shortness of breath.    Cardiovascular:  Negative for chest pain and leg swelling.   Gastrointestinal:  Negative for abdominal distention, abdominal pain, constipation and diarrhea.   Endocrine: Negative for cold intolerance, heat intolerance and polyuria.   Genitourinary:  Negative for bladder incontinence, dysuria, frequency and urgency.   Musculoskeletal:  Negative for arthralgias, gait problem and myalgias.   Integumentary:  Negative for color change, rash and wound.   Allergic/Immunologic: Negative for environmental allergies and food allergies.   Neurological:  Negative for dizziness, weakness, light-headedness and headaches.    Psychiatric/Behavioral:  Negative for behavioral problems and sleep disturbance.      Medical / Social / Family History     Past Medical History:   Diagnosis Date    Achilles tendinitis     Allergic rhinitis     Anxiety disorder     COVID-19 08/2021    x 2 (02/2021)    Depressive disorder     GERD (gastroesophageal reflux disease)     Recurrent genital herpes simplex type 2 infection     Taking multiple medications for chronic disease     Urinary retention 05/05/2022    PVR per ED 5/1/22 337 ml       Past Surgical History:   Procedure Laterality Date    COLONOSCOPY      ESOPHAGOGASTRODUODENOSCOPY      KNEE ARTHROSCOPY Left 2000    VASECTOMY  2005       Social History    reports that he has never smoked. He has never been exposed to tobacco smoke. He has never used smokeless tobacco. He reports current alcohol use. He reports that he does not use drugs.    Family History  's family history includes Diabetes in his father, maternal grandmother, and mother; Hypertension in his maternal grandmother; Lung cancer in his paternal grandfather; No Known Problems in his brother, daughter, daughter, daughter, maternal grandfather, paternal grandmother, sister, and son.    Medications and Allergies     Medications  Outpatient Medications Marked as Taking for the 4/19/23 encounter (Office Visit) with Rocio Talamantes NP   Medication Sig Dispense Refill    [DISCONTINUED] dextroamphetamine-amphetamine (ADDERALL) 20 mg tablet Take 1 tablet by mouth 2 (two) times a day. 60 tablet 0    [DISCONTINUED] ergocalciferol (ERGOCALCIFEROL) 50,000 unit Cap Take 1 capsule (50,000 Units total) by mouth every 7 days. 4 capsule 6    [DISCONTINUED] valACYclovir (VALTREX) 500 MG tablet Take 1 tablet (500 mg total) by mouth 2 (two) times daily. 60 tablet 5       Allergies  Review of patient's allergies indicates:  No Known Allergies    Physical Examination     Vitals:    04/19/23 1312   BP: (!) 164/86   Pulse: 89   Resp: 18   Temp: 98.1 °F  (36.7 °C)     Physical Exam  Vitals and nursing note reviewed.   HENT:      Head: Normocephalic.      Right Ear: Tympanic membrane normal.      Left Ear: Tympanic membrane normal.      Nose: Nose normal.      Mouth/Throat:      Mouth: Mucous membranes are moist.      Pharynx: Oropharynx is clear. No posterior oropharyngeal erythema.   Eyes:      Conjunctiva/sclera: Conjunctivae normal.   Cardiovascular:      Rate and Rhythm: Normal rate and regular rhythm.      Pulses: Normal pulses.      Heart sounds: Normal heart sounds.   Pulmonary:      Effort: Pulmonary effort is normal.      Breath sounds: Normal breath sounds.   Abdominal:      General: Abdomen is flat. Bowel sounds are normal. There is no distension.      Palpations: Abdomen is soft.   Musculoskeletal:         General: No swelling or tenderness. Normal range of motion.      Cervical back: Normal range of motion.      Right lower leg: No edema.      Left lower leg: No edema.   Skin:     General: Skin is warm and dry.      Capillary Refill: Capillary refill takes less than 2 seconds.   Neurological:      Mental Status: He is alert. Mental status is at baseline.   Psychiatric:         Mood and Affect: Mood normal.         Behavior: Behavior normal.             Lab Results   Component Value Date    WBC 9.64 05/01/2022    HGB 15.6 05/01/2022    HCT 44.0 05/01/2022    MCV 89.4 05/01/2022     05/01/2022        CMP  Sodium   Date Value Ref Range Status   05/01/2022 139 136 - 145 mmol/L Final     Potassium   Date Value Ref Range Status   05/01/2022 3.8 3.5 - 5.1 mmol/L Final     Chloride   Date Value Ref Range Status   05/01/2022 103 98 - 107 mmol/L Final     CO2   Date Value Ref Range Status   05/01/2022 25 21 - 32 mmol/L Final     Glucose   Date Value Ref Range Status   05/01/2022 100 74 - 106 mg/dL Final     BUN   Date Value Ref Range Status   05/01/2022 8 7 - 18 mg/dL Final     Creatinine   Date Value Ref Range Status   05/01/2022 0.96 0.70 - 1.30 mg/dL  Final     Calcium   Date Value Ref Range Status   05/01/2022 9.6 8.5 - 10.1 mg/dL Final     Total Protein   Date Value Ref Range Status   05/01/2022 7.7 6.4 - 8.2 g/dL Final     Albumin   Date Value Ref Range Status   05/01/2022 3.9 3.5 - 5.0 g/dL Final     Bilirubin, Total   Date Value Ref Range Status   05/01/2022 1.1 0.0 - 1.2 mg/dL Final     Alk Phos   Date Value Ref Range Status   05/01/2022 65 45 - 115 U/L Final     AST   Date Value Ref Range Status   05/01/2022 20 15 - 37 U/L Final     ALT   Date Value Ref Range Status   05/01/2022 25 16 - 61 U/L Final     Anion Gap   Date Value Ref Range Status   05/01/2022 15 7 - 16 mmol/L Final     Procedures   Assessment and Plan (including Health Maintenance)   :    Plan:           Problem List Items Addressed This Visit          Psychiatric    Attention deficit hyperactivity disorder (ADHD)    Current Assessment & Plan     Well controlled on Adderall 20mg BID. ADHD testing within last 7 years,  checked and is ok. NO signs of abuse, no aberrant behavior noted. Instructed patient to take meds as ordered. Follow up in 3 months or as needed.           Relevant Medications    dextroamphetamine-amphetamine (ADDERALL) 20 mg tablet       ID    Herpes    Overview     Continue current medication           Current Assessment & Plan     Continue current meds. Follow up as needed.            Relevant Medications    valACYclovir (VALTREX) 500 MG tablet       Endocrine    Vitamin D deficiency, unspecified    Current Assessment & Plan     Continue Ergocalciferol as ordered. Follow up in 6 months or as needed.           Relevant Medications    ergocalciferol (ERGOCALCIFEROL) 50,000 unit Cap       Health Maintenance Topics with due status: Not Due       Topic Last Completion Date    TETANUS VACCINE 01/21/2022    Hemoglobin A1c (Diabetic Prevention Screening) 02/07/2022    Lipid Panel 02/07/2022       Future Appointments   Date Time Provider Department Center   12/18/2023  1:30 PM  Ricardo Trevizo DO [facility]        Health Maintenance Due   Topic Date Due    Hepatitis C Screening  Never done    HIV Screening  Never done    COVID-19 Vaccine (3 - Booster) 02/16/2021    Colorectal Cancer Screening  Never done        No follow-ups on file.     Signature:  Rocio Talamantes NP  [facility]  [address] [address] [place]  [address]    Date of encounter: 4/19/23

## 2023-04-20 NOTE — ASSESSMENT & PLAN NOTE
Well controlled on Adderall 20mg BID. ADHD testing within last 7 years,  checked and is ok. NO signs of abuse, no aberrant behavior noted. Instructed patient to take meds as ordered. Follow up in 3 months or as needed.

## 2023-06-12 PROBLEM — J01.40 ACUTE NON-RECURRENT PANSINUSITIS: Status: RESOLVED | Noted: 2023-03-12 | Resolved: 2023-06-12

## 2023-07-31 ENCOUNTER — OFFICE VISIT (OUTPATIENT)
Dept: FAMILY MEDICINE | Facility: CLINIC | Age: 43
End: 2023-07-31
Payer: COMMERCIAL

## 2023-07-31 VITALS
HEART RATE: 92 BPM | RESPIRATION RATE: 19 BRPM | SYSTOLIC BLOOD PRESSURE: 144 MMHG | WEIGHT: 234 LBS | DIASTOLIC BLOOD PRESSURE: 84 MMHG | BODY MASS INDEX: 32.76 KG/M2 | OXYGEN SATURATION: 99 % | TEMPERATURE: 98 F | HEIGHT: 71 IN

## 2023-07-31 DIAGNOSIS — Z79.899 ENCOUNTER FOR LONG-TERM (CURRENT) USE OF MEDICATIONS: ICD-10-CM

## 2023-07-31 DIAGNOSIS — E55.9 VITAMIN D DEFICIENCY, UNSPECIFIED: ICD-10-CM

## 2023-07-31 DIAGNOSIS — E03.8 TSH (THYROID-STIMULATING HORMONE DEFICIENCY): ICD-10-CM

## 2023-07-31 DIAGNOSIS — R53.83 FATIGUE, UNSPECIFIED TYPE: ICD-10-CM

## 2023-07-31 DIAGNOSIS — F90.9 ATTENTION DEFICIT HYPERACTIVITY DISORDER (ADHD), UNSPECIFIED ADHD TYPE: Primary | ICD-10-CM

## 2023-07-31 LAB
25(OH)D3 SERPL-MCNC: 32.3 NG/ML
CTP QC/QA: YES
POC (AMP) AMPHETAMINE: NEGATIVE
POC (BAR) BARBITURATES: NEGATIVE
POC (BUP) BUPRENORPHINE: NEGATIVE
POC (BZO) BENZODIAZEPINES: NEGATIVE
POC (COC) COCAINE: NEGATIVE
POC (MDMA) METHYLENEDIOXYMETHAMPHETAMINE 3,4: NEGATIVE
POC (MET) METHAMPHETAMINE: NEGATIVE
POC (MOP) OPIATES: NEGATIVE
POC (MTD) METHADONE: NEGATIVE
POC (OXY) OXYCODONE: NEGATIVE
POC (PCP) PHENCYCLIDINE: NEGATIVE
POC (TCA) TRICYCLIC ANTIDEPRESSANTS: NEGATIVE
POC TEMPERATURE (URINE): NORMAL
POC THC: NEGATIVE
TSH SERPL DL<=0.005 MIU/L-ACNC: 1.44 UIU/ML (ref 0.36–3.74)

## 2023-07-31 PROCEDURE — 3044F PR MOST RECENT HEMOGLOBIN A1C LEVEL <7.0%: ICD-10-PCS | Mod: ,,, | Performed by: NURSE PRACTITIONER

## 2023-07-31 PROCEDURE — 84403 TESTOSTERONE, FREE (DIALYSIS) AND TOTAL, LC/MS/MS: ICD-10-PCS | Mod: 90,,, | Performed by: CLINICAL MEDICAL LABORATORY

## 2023-07-31 PROCEDURE — 3079F DIAST BP 80-89 MM HG: CPT | Mod: ,,, | Performed by: NURSE PRACTITIONER

## 2023-07-31 PROCEDURE — 3008F PR BODY MASS INDEX (BMI) DOCUMENTED: ICD-10-PCS | Mod: ,,, | Performed by: NURSE PRACTITIONER

## 2023-07-31 PROCEDURE — 84402 ASSAY OF FREE TESTOSTERONE: CPT | Mod: 90,,, | Performed by: CLINICAL MEDICAL LABORATORY

## 2023-07-31 PROCEDURE — 82306 VITAMIN D: ICD-10-PCS | Mod: ,,, | Performed by: CLINICAL MEDICAL LABORATORY

## 2023-07-31 PROCEDURE — 80305 POCT URINE DRUG SCREEN PRESUMP: ICD-10-PCS | Mod: QW,,, | Performed by: NURSE PRACTITIONER

## 2023-07-31 PROCEDURE — 84402 TESTOSTERONE, FREE (DIALYSIS) AND TOTAL, LC/MS/MS: ICD-10-PCS | Mod: 90,,, | Performed by: CLINICAL MEDICAL LABORATORY

## 2023-07-31 PROCEDURE — 99214 OFFICE O/P EST MOD 30 MIN: CPT | Mod: ,,, | Performed by: NURSE PRACTITIONER

## 2023-07-31 PROCEDURE — 3008F BODY MASS INDEX DOCD: CPT | Mod: ,,, | Performed by: NURSE PRACTITIONER

## 2023-07-31 PROCEDURE — 84403 ASSAY OF TOTAL TESTOSTERONE: CPT | Mod: 90,,, | Performed by: CLINICAL MEDICAL LABORATORY

## 2023-07-31 PROCEDURE — 3077F PR MOST RECENT SYSTOLIC BLOOD PRESSURE >= 140 MM HG: ICD-10-PCS | Mod: ,,, | Performed by: NURSE PRACTITIONER

## 2023-07-31 PROCEDURE — 3044F HG A1C LEVEL LT 7.0%: CPT | Mod: ,,, | Performed by: NURSE PRACTITIONER

## 2023-07-31 PROCEDURE — 82306 VITAMIN D 25 HYDROXY: CPT | Mod: ,,, | Performed by: CLINICAL MEDICAL LABORATORY

## 2023-07-31 PROCEDURE — 80305 DRUG TEST PRSMV DIR OPT OBS: CPT | Mod: QW,,, | Performed by: NURSE PRACTITIONER

## 2023-07-31 PROCEDURE — 3079F PR MOST RECENT DIASTOLIC BLOOD PRESSURE 80-89 MM HG: ICD-10-PCS | Mod: ,,, | Performed by: NURSE PRACTITIONER

## 2023-07-31 PROCEDURE — 99214 PR OFFICE/OUTPT VISIT, EST, LEVL IV, 30-39 MIN: ICD-10-PCS | Mod: ,,, | Performed by: NURSE PRACTITIONER

## 2023-07-31 PROCEDURE — 84443 TSH: ICD-10-PCS | Mod: ,,, | Performed by: CLINICAL MEDICAL LABORATORY

## 2023-07-31 PROCEDURE — 84443 ASSAY THYROID STIM HORMONE: CPT | Mod: ,,, | Performed by: CLINICAL MEDICAL LABORATORY

## 2023-07-31 PROCEDURE — 3077F SYST BP >= 140 MM HG: CPT | Mod: ,,, | Performed by: NURSE PRACTITIONER

## 2023-07-31 RX ORDER — ERGOCALCIFEROL 1.25 MG/1
50000 CAPSULE ORAL
Qty: 12 CAPSULE | Refills: 1 | Status: SHIPPED | OUTPATIENT
Start: 2023-07-31 | End: 2023-11-21 | Stop reason: SDUPTHER

## 2023-07-31 RX ORDER — DEXTROAMPHETAMINE SACCHARATE, AMPHETAMINE ASPARTATE, DEXTROAMPHETAMINE SULFATE AND AMPHETAMINE SULFATE 5; 5; 5; 5 MG/1; MG/1; MG/1; MG/1
1 TABLET ORAL 2 TIMES DAILY
Qty: 60 TABLET | Refills: 0 | Status: SHIPPED | OUTPATIENT
Start: 2023-07-31 | End: 2023-09-22 | Stop reason: SDUPTHER

## 2023-07-31 NOTE — PROGRESS NOTES
Rocio Talamantes NP   [facility]  [address]  [place]  [address]      PATIENT NAME: Hemanth Cottrell  : 1980  DATE: 23  MRN: [id number]      Billing Provider: Rocio Talamantes NP  Level of Service: DE OFFICE/OUTPT VISIT, EST, LEVL IV, 30-39 MIN  Patient PCP Information       Provider PCP Type    Rocio Talamantes NP General            Reason for Visit / Chief Complaint: Medication Refill         History of Present Illness / Problem Focused Workflow     Hemanth Cottrell presents to the clinic with Medication Refill     43 year old male presents to clinic for medication refill and check up. States he has been feeling more fatigued than usual, would like to have testosterone checked.         Review of Systems     @Review of Systems   Constitutional:  Positive for fatigue. Negative for activity change, appetite change and fever.   HENT:  Negative for nasal congestion, ear pain, rhinorrhea, sinus pressure/congestion and sore throat.    Eyes:  Negative for pain, redness, visual disturbance and eye dryness.   Respiratory:  Negative for cough and shortness of breath.    Cardiovascular:  Negative for chest pain and leg swelling.   Gastrointestinal:  Negative for abdominal distention, abdominal pain, constipation and diarrhea.   Endocrine: Negative for cold intolerance, heat intolerance and polyuria.   Genitourinary:  Negative for bladder incontinence, dysuria, frequency and urgency.   Musculoskeletal:  Negative for arthralgias, gait problem and myalgias.   Integumentary:  Negative for color change, rash and wound.   Allergic/Immunologic: Negative for environmental allergies and food allergies.   Neurological:  Negative for dizziness, weakness, light-headedness and headaches.   Psychiatric/Behavioral:  Negative for behavioral problems and sleep disturbance.        Medical / Social / Family History     Past Medical History:   Diagnosis Date    Achilles tendinitis     Allergic rhinitis      Anxiety disorder     COVID-19 08/2021    x 2 (02/2021)    Depressive disorder     GERD (gastroesophageal reflux disease)     Recurrent genital herpes simplex type 2 infection     Taking multiple medications for chronic disease     Urinary retention 05/05/2022    PVR per ED 5/1/22 337 ml       Past Surgical History:   Procedure Laterality Date    COLONOSCOPY      ESOPHAGOGASTRODUODENOSCOPY      KNEE ARTHROSCOPY Left 2000    VASECTOMY  2005       Social History    reports that he has never smoked. He has never been exposed to tobacco smoke. He has never used smokeless tobacco. He reports current alcohol use. He reports that he does not use drugs.    Family History  's family history includes Diabetes in his father, maternal grandmother, and mother; Hypertension in his maternal grandmother; Lung cancer in his paternal grandfather; No Known Problems in his brother, daughter, daughter, daughter, maternal grandfather, paternal grandmother, sister, and son.    Medications and Allergies     Medications  Outpatient Medications Marked as Taking for the 7/31/23 encounter (Office Visit) with Rocio Talamantes NP   Medication Sig Dispense Refill    dextroamphetamine-amphetamine (ADDERALL) 20 mg tablet Take 1 tablet by mouth 2 (two) times a day. 60 tablet 0    dextroamphetamine-amphetamine (ADDERALL) 20 mg tablet Take 1 tablet by mouth 2 (two) times a day. 60 tablet 0    valACYclovir (VALTREX) 500 MG tablet Take 1 tablet (500 mg total) by mouth 2 (two) times daily. 180 tablet 1    [DISCONTINUED] dextroamphetamine-amphetamine (ADDERALL) 20 mg tablet Take 1 tablet by mouth 2 (two) times a day. 60 tablet 0    [DISCONTINUED] ergocalciferol (ERGOCALCIFEROL) 50,000 unit Cap Take 1 capsule (50,000 Units total) by mouth every 7 days. 12 capsule 1       Allergies  Review of patient's allergies indicates:  No Known Allergies    Physical Examination     Vitals:    07/31/23 0903   BP: (!) 144/84   Pulse: 92   Resp: 19   Temp: 97.7  °F (36.5 °C)     Physical Exam  Vitals and nursing note reviewed.   HENT:      Head: Normocephalic.      Right Ear: Tympanic membrane normal.      Left Ear: Tympanic membrane normal.      Nose: Nose normal.      Mouth/Throat:      Mouth: Mucous membranes are moist.      Pharynx: Oropharynx is clear. No posterior oropharyngeal erythema.   Eyes:      Conjunctiva/sclera: Conjunctivae normal.   Cardiovascular:      Rate and Rhythm: Normal rate and regular rhythm.      Pulses: Normal pulses.      Heart sounds: Normal heart sounds.   Pulmonary:      Effort: Pulmonary effort is normal.      Breath sounds: Normal breath sounds.   Abdominal:      General: Abdomen is flat. Bowel sounds are normal. There is no distension.      Palpations: Abdomen is soft.   Musculoskeletal:         General: No swelling or tenderness. Normal range of motion.      Cervical back: Normal range of motion.      Right lower leg: No edema.      Left lower leg: No edema.   Skin:     General: Skin is warm and dry.      Capillary Refill: Capillary refill takes less than 2 seconds.   Neurological:      Mental Status: He is alert. Mental status is at baseline.   Psychiatric:         Mood and Affect: Mood normal.         Behavior: Behavior normal.               Lab Results   Component Value Date    WBC 9.64 05/01/2022    HGB 15.6 05/01/2022    HCT 44.0 05/01/2022    MCV 89.4 05/01/2022     05/01/2022        CMP  Sodium   Date Value Ref Range Status   05/01/2022 139 136 - 145 mmol/L Final     Potassium   Date Value Ref Range Status   05/01/2022 3.8 3.5 - 5.1 mmol/L Final     Chloride   Date Value Ref Range Status   05/01/2022 103 98 - 107 mmol/L Final     CO2   Date Value Ref Range Status   05/01/2022 25 21 - 32 mmol/L Final     Glucose   Date Value Ref Range Status   05/01/2022 100 74 - 106 mg/dL Final     BUN   Date Value Ref Range Status   05/01/2022 8 7 - 18 mg/dL Final     Creatinine   Date Value Ref Range Status   05/01/2022 0.96 0.70 - 1.30  mg/dL Final     Calcium   Date Value Ref Range Status   05/01/2022 9.6 8.5 - 10.1 mg/dL Final     Total Protein   Date Value Ref Range Status   05/01/2022 7.7 6.4 - 8.2 g/dL Final     Albumin   Date Value Ref Range Status   05/01/2022 3.9 3.5 - 5.0 g/dL Final     Bilirubin, Total   Date Value Ref Range Status   05/01/2022 1.1 0.0 - 1.2 mg/dL Final     Alk Phos   Date Value Ref Range Status   05/01/2022 65 45 - 115 U/L Final     AST   Date Value Ref Range Status   05/01/2022 20 15 - 37 U/L Final     ALT   Date Value Ref Range Status   05/01/2022 25 16 - 61 U/L Final     Anion Gap   Date Value Ref Range Status   05/01/2022 15 7 - 16 mmol/L Final     Procedures   Assessment and Plan (including Health Maintenance)   :    Plan:           Problem List Items Addressed This Visit          Psychiatric    Attention deficit hyperactivity disorder (ADHD) - Primary    Current Assessment & Plan     Well controlled on Adderall 20mg BID. ADHD testing within last 7 years,  checked and is ok. NO signs of abuse, no aberrant behavior noted. Instructed patient to take meds as ordered. Follow up in 3 months or as needed.         Relevant Medications    dextroamphetamine-amphetamine (ADDERALL) 20 mg tablet    dextroamphetamine-amphetamine (ADDERALL) 20 mg tablet    dextroamphetamine-amphetamine (ADDERALL) 20 mg tablet       Endocrine    Vitamin D deficiency, unspecified    Relevant Medications    ergocalciferol (ERGOCALCIFEROL) 50,000 unit Cap    Other Relevant Orders    Vitamin D (Completed)     Other Visit Diagnoses       Fatigue, unspecified type        Relevant Orders    Testosterone, Total and Free, S (Completed)    TSH (Completed)    TSH (thyroid-stimulating hormone deficiency)        Encounter for long-term (current) use of medications        Relevant Orders    POCT Urine Drug Screen Presump (Completed)            Health Maintenance Topics with due status: Not Due       Topic Last Completion Date    TETANUS VACCINE 01/21/2022     Influenza Vaccine 11/11/2022    Hemoglobin A1c (Diabetic Prevention Screening) 08/23/2023    Lipid Panel 08/23/2023       Future Appointments   Date Time Provider Department Center   12/18/2023  1:30 PM Ricardo Trevizo DO [facility]    8/22/2024  1:30 PM Rocio Talamantes NP [facility]        Health Maintenance Due   Topic Date Due    Hepatitis C Screening  Never done    Colorectal Cancer Screening  Never done        No follow-ups on file.     Signature:  Rocio Talamantes NP  [address]  [address]    Date of encounter: 7/31/23

## 2023-08-08 LAB
TESTOST FREE SERPL-MCNC: 14.5 NG/DL (ref 4.26–16.4)
TESTOST SERPL-MCNC: 460 NG/DL (ref 240–950)

## 2023-08-22 ENCOUNTER — OFFICE VISIT (OUTPATIENT)
Dept: FAMILY MEDICINE | Facility: CLINIC | Age: 43
End: 2023-08-22
Payer: COMMERCIAL

## 2023-08-22 VITALS
TEMPERATURE: 98 F | RESPIRATION RATE: 18 BRPM | HEART RATE: 89 BPM | OXYGEN SATURATION: 98 % | HEIGHT: 71 IN | WEIGHT: 238.38 LBS | DIASTOLIC BLOOD PRESSURE: 74 MMHG | SYSTOLIC BLOOD PRESSURE: 142 MMHG | BODY MASS INDEX: 33.37 KG/M2

## 2023-08-22 DIAGNOSIS — Z13.1 SCREENING FOR DIABETES MELLITUS: ICD-10-CM

## 2023-08-22 DIAGNOSIS — Z13.220 SCREENING FOR LIPOID DISORDERS: ICD-10-CM

## 2023-08-22 DIAGNOSIS — Z00.00 ROUTINE GENERAL MEDICAL EXAMINATION AT A HEALTH CARE FACILITY: Primary | ICD-10-CM

## 2023-08-22 PROCEDURE — 3077F SYST BP >= 140 MM HG: CPT | Mod: ,,, | Performed by: NURSE PRACTITIONER

## 2023-08-22 PROCEDURE — 1160F PR REVIEW ALL MEDS BY PRESCRIBER/CLIN PHARMACIST DOCUMENTED: ICD-10-PCS | Mod: ,,, | Performed by: NURSE PRACTITIONER

## 2023-08-22 PROCEDURE — 99396 PR PREVENTIVE VISIT,EST,40-64: ICD-10-PCS | Mod: ,,, | Performed by: NURSE PRACTITIONER

## 2023-08-22 PROCEDURE — 3077F PR MOST RECENT SYSTOLIC BLOOD PRESSURE >= 140 MM HG: ICD-10-PCS | Mod: ,,, | Performed by: NURSE PRACTITIONER

## 2023-08-22 PROCEDURE — 1160F RVW MEDS BY RX/DR IN RCRD: CPT | Mod: ,,, | Performed by: NURSE PRACTITIONER

## 2023-08-22 PROCEDURE — 3008F PR BODY MASS INDEX (BMI) DOCUMENTED: ICD-10-PCS | Mod: ,,, | Performed by: NURSE PRACTITIONER

## 2023-08-22 PROCEDURE — 99396 PREV VISIT EST AGE 40-64: CPT | Mod: ,,, | Performed by: NURSE PRACTITIONER

## 2023-08-22 PROCEDURE — 3008F BODY MASS INDEX DOCD: CPT | Mod: ,,, | Performed by: NURSE PRACTITIONER

## 2023-08-22 PROCEDURE — 1159F PR MEDICATION LIST DOCUMENTED IN MEDICAL RECORD: ICD-10-PCS | Mod: ,,, | Performed by: NURSE PRACTITIONER

## 2023-08-22 PROCEDURE — 3078F DIAST BP <80 MM HG: CPT | Mod: ,,, | Performed by: NURSE PRACTITIONER

## 2023-08-22 PROCEDURE — 3078F PR MOST RECENT DIASTOLIC BLOOD PRESSURE < 80 MM HG: ICD-10-PCS | Mod: ,,, | Performed by: NURSE PRACTITIONER

## 2023-08-22 PROCEDURE — 1159F MED LIST DOCD IN RCRD: CPT | Mod: ,,, | Performed by: NURSE PRACTITIONER

## 2023-08-22 NOTE — PATIENT INSTRUCTIONS
"Patient Education       High Blood Pressure in Adults   The Basics   Written by the doctors and editors at [facility]   What is high blood pressure? -- High blood pressure is a condition that puts you at risk for heart attack, stroke, and kidney disease. It does not usually cause symptoms. But it can be serious.  When your doctor or nurse tells you your blood pressure, they will say 2 numbers. For instance, your doctor or nurse might say that your blood pressure is "130 over 80." The top number is the pressure inside your arteries when your heart is nghia. The bottom number is the pressure inside your arteries when your heart is relaxed.  "Elevated blood pressure" is a term doctors or nurses use as a warning. People with elevated blood pressure do not yet have high blood pressure. But their blood pressure is not as low as it should be for good health.  Many experts define high, elevated, and normal blood pressure as follows:  High - Top number of 130 or above and/or bottom number of 80 or above  Elevated - Top number between 120 and 129 and bottom number of 79 or below  Normal - Top number of 119 or below and bottom number of 79 or below  This information is also in the table (table 1).   How can I lower my blood pressure? -- If your doctor or nurse has prescribed blood pressure medicine, the most important thing you can do is to take it. If it causes side effects, do not just stop taking it. Instead, talk to your doctor or nurse about the problems it causes. They might be able to lower your dose or switch you to another medicine. If cost is a problem, mention that too. They might be able to put you on a less expensive medicine. Taking your blood pressure medicine can keep you from having a heart attack or stroke, and it can save your life!  Can I do anything on my own? -- You have a lot of control over your blood pressure. To lower it:  Lose weight (if you are overweight)  Choose a diet low in fat and rich in " "fruits, vegetables, and low-fat dairy products  Reduce the amount of salt you eat  Do something active for at least 30 minutes a day on most days of the week  Cut down on alcohol (if you drink more than 2 alcoholic drinks per day)  It's also a good idea to get a home blood pressure meter. People who check their own blood pressure at home do better at keeping it low and can sometimes even reduce the amount of medicine they take.  All topics are updated as new evidence becomes available and our peer review process is complete.  This topic retrieved from [employer] on: Sep 21, 2021.  Topic 37024 Version 15.0  Release: 29.4.2 - C29.263  © 2021 UpToDate, Inc. and/or its affiliates. All rights reserved.  table 1: Definition of normal and high blood pressure  Level  Top number  Bottom number    High 130 or above 80 or above   Elevated 120 to 129 79 or below   Normal 119 or below 79 or below   These definitions are from the American College of Cardiology/American Heart Association. Other expert groups might use slightly different definitions.  "Elevated blood pressure" is a term doctor or nurses use as a warning. It means you do not yet have high blood pressure, but your blood pressure is not as low as it should be for good health.  Graphic 14805 Version 6.0  Consumer Information Use and Disclaimer   This information is not specific medical advice and does not replace information you receive from your health care provider. This is only a brief summary of general information. It does NOT include all information about conditions, illnesses, injuries, tests, procedures, treatments, therapies, discharge instructions or life-style choices that may apply to you. You must talk with your health care provider for complete information about your health and treatment options. This information should not be used to decide whether or not to accept your health care provider's advice, instructions or recommendations. Only your health care " "provider has the knowledge and training to provide advice that is right for you. The use of this information is governed by the [employer] End User License Agreement, available at https://www.[employer]/en/solutions/StyleShare/about/brett.The use of [employer] content is governed by the [employer] Terms of Use. ©2021 UpToDate, Inc. All rights reserved.  Copyright   © 2021 UpToDate, Inc. and/or its affiliates. All rights reserved.    Patient Education       Hemoglobin A1C Tests   The Basics   Written by the doctors and editors at [employer]   What is hemoglobin A1C? -- Hemoglobin A1C is a blood test that shows what your average blood sugar level has been for the past 2 to 3 months. Doctors and nurses use this test for 2 reasons:  To see whether a person has diabetes  To see whether diabetes treatment is working the right way  Other names for hemoglobin A1C are "glycated hemoglobin," "HbA1C," or just "A1C."  What should my A1C numbers be? -- That depends on why you have the test.  When checking for diabetes - If you had an A1C test to see if you have diabetes, your A1C should be 6 or less.  If your A1C is 6.5 or higher, it probably means you have diabetes, but you should have the test done again to be sure.  If your A1C is between 5.7 and 6.4, you are at risk for getting diabetes. You should probably start doing things that can help prevent diabetes. For example, you should become more active and lose weight (if you are overweight).  When checking how treatment is working - If you already know you have diabetes, and you had an A1C test to see how well controlled your blood sugar is, your A1C should probably be 7 or less. But you need to check with your doctor on what your level should be. Not everyone with diabetes is the same. Some people need to aim for different A1C levels than others.  Can I do this test at home? -- It is now possible to buy kits to test your A1C at home. But home testing of A1C is not usually " necessary.  How often should I have an A1C test? -- That depends on whether you have diabetes and on what your last A1C test showed.  If you had an A1C test to check for diabetes and your A1C was less than 5.7 (meaning you do not have diabetes), you should have A1C tests done every 3 years.  If you had an A1C test to check for diabetes and your A1C was between 5.7 and 6.4 (meaning you do not have diabetes but are at risk for it), you should have A1C tests done every 1 to 2 years.  If you do have diabetes and your blood sugar is well controlled, you should have A1C tests every 6 months.  If you have diabetes and you recently changed treatment plans or you are having trouble controlling your blood sugar, you should have A1C tests every 3 months.  Why do my A1C numbers matter? -- Studies show that keeping A1C numbers close to normal helps keep people from getting:  Diabetic retinopathy, an eye disease that can cause blindness  Nerve damage caused by diabetes (also called neuropathy)  Kidney disease  For people with newly diagnosed diabetes, keeping the A1C close to normal might also prevent heart attacks and strokes in the future.  Do I still need to measure my blood sugar at home? -- If your doctor wants you to check your blood sugar at home, you should keep doing so even if you have routine A1C tests. Blood sugar tests tell you what your blood sugar is from moment to moment. That's important information to have, because it lets you know if your medications and lifestyle changes are keeping your blood sugar in a safe range.  All topics are updated as new evidence becomes available and our peer review process is complete.  This topic retrieved from [employer] on: Sep 21, 2021.  Topic 20206 Version 8.0  Release: 29.4.2 - C29.263  © 2021 UpToDate, Inc. and/or its affiliates. All rights reserved.  table 1: A1C level and average blood sugar  If your A1C level is (percent):  That means your average blood sugar level during  the past two to three months was:     If you live within the [place], use these values. Your blood sugar is measured in milligrams/deciliter (mg/dL).  If you live outside the [place], use these values. Your blood sugar is measured in millimoles/liter (mmol/L).    5 97 5.4   6 126 7   7 154 8.6   8 183 10.2   9 212 11.8   10 240 13.3   11 269 15   12 298 16.5   13 326 18.1   14 355 19.7   The A1C blood test tells you what your average blood sugar level has been for the past two to three months. This table lists which A1C levels go with which average blood sugar levels. Blood sugar is measured differently within the [place] than it is in most other countries. The column in the middle is for people in the [place]. The column on the right is for people who live outside the [place].  Graphic 46686 Version 3.0  Consumer Information Use and Disclaimer   This information is not specific medical advice and does not replace information you receive from your health care provider. This is only a brief summary of general information. It does NOT include all information about conditions, illnesses, injuries, tests, procedures, treatments, therapies, discharge instructions or life-style choices that may apply to you. You must talk with your health care provider for complete information about your health and treatment options. This information should not be used to decide whether or not to accept your health care provider's advice, instructions or recommendations. Only your health care provider has the knowledge and training to provide advice that is right for you. The use of this information is governed by the [employer] End User License Agreement, available at https://www.[employer].Cardiac Insight/en/solutions/Rumgr/about/brett.The use of [employer] content is governed by the [employer] Terms of Use. ©2021 UpToDate, Inc. All rights reserved.  Copyright   © 2021 UpToDate, Inc. and/or its affiliates. All rights  "reserved.    Patient Education       Diet and Health   The Basics   Written by the doctors and editors at [facility]   Why is it important to eat a healthy diet? -- It's important to eat a healthy diet because eating the right foods can keep you healthy now and later on in life.  Which foods are especially healthy? -- Foods that are especially healthy include:  Fruits and vegetables - Eating a diet with lots of fruits and vegetables can help prevent heart disease and strokes. It might also help prevent certain types of cancers. Try to eat fruits and vegetables at each meal and also for snacks. If you don't have fresh fruits and vegetables available, you can eat frozen or canned ones instead. Doctors recommend eating at least 2 1/2 servings of vegetables and 2 servings of fruits each day.  Foods with fiber - Eating foods with a lot of fiber can help prevent heart disease and strokes. If you have type 2 diabetes, it can also help control your blood sugar. Foods that have a lot of fiber include vegetables, fruits, beans, nuts, oatmeal, and whole grain breads and cereals. You can tell how much fiber is in a food by reading the nutrition label (figure 1). Doctors recommend eating 25 to 36 grams of fiber each day.  Foods with folate (also called folic acid) - Folate is a vitamin that is important for pregnant people, since it helps prevent certain birth defects. Anyone who could get pregnant should get at least 400 micrograms of folic acid daily, whether or not they are actively trying to get pregnant. Folate is found in many breakfast cereals, oranges, orange juice, and green leafy vegetables.  Foods with calcium and vitamin D - Babies, children, and adults need calcium and vitamin D to help keep their bones strong. Adults also need calcium and vitamin D to help prevent osteoporosis. Osteoporosis is a condition that causes bones to get "thin" and break more easily than usual. Different foods and drinks have calcium and " "vitamin D in them (figure 2). People who don't get enough calcium and vitamin D in their diet might need to take a supplement.  Foods with protein - Protein helps your muscles stay strong. Healthy foods with a lot of protein include chicken, fish, eggs, beans, nuts, and soy products. Red meat also has a lot of protein, but it also contains fats, which can be unhealthy.  Some experts recommend a "Mediterranean diet." This involves eating a lot of fruits, vegetables, nuts, whole grains, and olive oil. It also includes some fish, poultry, and dairy products, but not a lot of red meat. Eating this way can help your overall health, and might even lower your risk of having a stroke.  What foods should I avoid or limit? -- To eat a healthy diet, there are some things you should avoid or limit. They include:   Fats - There are different types of fats. Some types of fats are better for your body than others.  Trans fats are especially unhealthy. They are found in margarines, many fast foods, and some store-bought baked goods. Trans fats can raise your cholesterol level and your increase your chance of getting heart disease. You should avoid eating foods with these types of fats.  The type of "polyunsaturated" fats found in fish seems to be healthy and can reduce your chance of getting heart disease. Other polyunsaturated fats might also be good for your health. When you cook, it's best to use oils with healthier fats, such as olive oil and canola oil.  Sugar - To have a healthy diet, it's important to limit or avoid sugar, sweets, and refined grains. Refined grains are found in white bread, white rice, most forms of pasta, and most packaged "snack" foods. Whole grains, such as whole-wheat bread and brown rice, have more fiber and are better for your health.  Avoiding sugar-sweetened beverages, like soda and sports drinks, can also help improve your health.  Red meat - Studies have shown that eating a lot of red meat can " "increase your risk of certain health problems, including heart disease and cancer. You should limit the amount of red meat that you eat.  Can I drink alcohol as part of a healthy diet? -- People who drink a small amount of alcohol each day might have a lower chance of getting heart disease. But drinking alcohol can lead to problems. For example, it can raise a person's chances of getting liver disease and certain types of cancers. In women, even 1 drink a day can increase the risk of getting breast cancer.  Most doctors recommend that adult women not have more than 1 drink a day and that adult men not have more than 2 drinks a day. The limits are different because most women's bodies take longer to break down alcohol.  How many calories do I need each day? -- The number of calories you need each day depends on your weight, height, age, sex, and how active you are.  Your doctor or nurse can tell you how many calories you should eat each day. If you are trying to lose weight, you should eat fewer calories each day.  What if I have questions? -- If you have questions about which foods you should or should not eat, ask your doctor or nurse. The right diet for you will depend, in part, on your health and any medical conditions you have.  All topics are updated as new evidence becomes available and our peer review process is complete.  This topic retrieved from [facility] on: Sep 21, 2021.  Topic 39756 Version 20.0  Release: 29.4.2 - C29.263  © 2021 UpToDate, Inc. and/or its affiliates. All rights reserved.  figure 1: Nutrition label - fiber     This is an example of a nutrition label. To figure out how much fiber is in a food, look for the line that says "Dietary Fiber." It's also important to look at the serving size. This food has 7 grams of fiber in each serving, and each serving is 1 cup.  Graphic 46913 Version 7.0    figure 2: Foods and drinks with calcium and vitamin D     Foods rich in calcium include ice cream, soy " "milk, breads, kale, broccoli, milk, cheese, cottage cheese, almonds, yogurt, ready-to-eat cereals, beans, and tofu. Foods rich in vitamin D include milk, fortified plant-based "milks" (soy, almond), canned tuna fish, cod liver oil, yogurt, ready-to-eat-cereals, cooked salmon, canned sardines, mackerel, and eggs. Some of these foods are rich in both.  Graphic 54375 Version 4.0    Consumer Information Use and Disclaimer   This information is not specific medical advice and does not replace information you receive from your health care provider. This is only a brief summary of general information. It does NOT include all information about conditions, illnesses, injuries, tests, procedures, treatments, therapies, discharge instructions or life-style choices that may apply to you. You must talk with your health care provider for complete information about your health and treatment options. This information should not be used to decide whether or not to accept your health care provider's advice, instructions or recommendations. Only your health care provider has the knowledge and training to provide advice that is right for you. The use of this information is governed by the [employer] End User License Agreement, available at https://www.[employer] Care/en/solutions/[employer]/about/brett.The use of [employer] content is governed by the [employer] Terms of Use. ©2021 UpToDate, Inc. All rights reserved.  Copyright   © 2021 UpToDate, Inc. and/or its affiliates. All rights reserved.    "

## 2023-08-22 NOTE — PROGRESS NOTES
Rocio Talamantes NP   [facility]  [address] [address] [place]  [address]      PATIENT NAME: Hemanth Cottrell  : 1980  DATE: 23  MRN: [id number]      Billing Provider: Rocio Talamantes NP  Level of Service:   Patient PCP Information       Provider PCP Type    Rocio Talamantes NP General            Reason for Visit / Chief Complaint: Healthy You         History of Present Illness / Problem Focused Workflow     Hemanth Cottrell presents to the clinic with Healthy You     HPI    Review of Systems     @Review of Systems    Medical / Social / Family History     Past Medical History:   Diagnosis Date    Achilles tendinitis     Allergic rhinitis     Anxiety disorder     COVID-19 08/2021    x 2 (2021)    Depressive disorder     GERD (gastroesophageal reflux disease)     Recurrent genital herpes simplex type 2 infection     Taking multiple medications for chronic disease     Urinary retention 2022    PVR per ED 22 337 ml       Past Surgical History:   Procedure Laterality Date    COLONOSCOPY      ESOPHAGOGASTRODUODENOSCOPY      KNEE ARTHROSCOPY Left     VASECTOMY         Social History    reports that he has never smoked. He has never been exposed to tobacco smoke. He has never used smokeless tobacco. He reports current alcohol use. He reports that he does not use drugs.    Family History  's family history includes Diabetes in his father, maternal grandmother, and mother; Hypertension in his maternal grandmother; Lung cancer in his paternal grandfather; No Known Problems in his brother, daughter, daughter, daughter, maternal grandfather, paternal grandmother, sister, and son.    Medications and Allergies     Medications  Outpatient Medications Marked as Taking for the 23 encounter (Office Visit) with Rocio Talamantes NP   Medication Sig Dispense Refill    dextroamphetamine-amphetamine (ADDERALL) 20 mg tablet Take 1 tablet by mouth 2 (two) times a day. 60  tablet 0    ergocalciferol (ERGOCALCIFEROL) 50,000 unit Cap Take 1 capsule (50,000 Units total) by mouth every 7 days. 12 capsule 1    valACYclovir (VALTREX) 500 MG tablet Take 1 tablet (500 mg total) by mouth 2 (two) times daily. 180 tablet 1       Allergies  Review of patient's allergies indicates:  No Known Allergies    Physical Examination     Vitals:    08/22/23 1324   BP: (!) 142/74   Pulse: 89   Resp: 18   Temp: 98 °F (36.7 °C)     Physical Exam          Lab Results   Component Value Date    WBC 9.64 05/01/2022    HGB 15.6 05/01/2022    HCT 44.0 05/01/2022    MCV 89.4 05/01/2022     05/01/2022        CMP  Sodium   Date Value Ref Range Status   05/01/2022 139 136 - 145 mmol/L Final     Potassium   Date Value Ref Range Status   05/01/2022 3.8 3.5 - 5.1 mmol/L Final     Chloride   Date Value Ref Range Status   05/01/2022 103 98 - 107 mmol/L Final     CO2   Date Value Ref Range Status   05/01/2022 25 21 - 32 mmol/L Final     Glucose   Date Value Ref Range Status   05/01/2022 100 74 - 106 mg/dL Final     BUN   Date Value Ref Range Status   05/01/2022 8 7 - 18 mg/dL Final     Creatinine   Date Value Ref Range Status   05/01/2022 0.96 0.70 - 1.30 mg/dL Final     Calcium   Date Value Ref Range Status   05/01/2022 9.6 8.5 - 10.1 mg/dL Final     Total Protein   Date Value Ref Range Status   05/01/2022 7.7 6.4 - 8.2 g/dL Final     Albumin   Date Value Ref Range Status   05/01/2022 3.9 3.5 - 5.0 g/dL Final     Bilirubin, Total   Date Value Ref Range Status   05/01/2022 1.1 0.0 - 1.2 mg/dL Final     Alk Phos   Date Value Ref Range Status   05/01/2022 65 45 - 115 U/L Final     AST   Date Value Ref Range Status   05/01/2022 20 15 - 37 U/L Final     ALT   Date Value Ref Range Status   05/01/2022 25 16 - 61 U/L Final     Anion Gap   Date Value Ref Range Status   05/01/2022 15 7 - 16 mmol/L Final     Procedures   Assessment and Plan (including Health Maintenance)   :    Plan:           Problem List Items Addressed This  Visit    None  Visit Diagnoses       Screening for lipoid disorders    -  Primary    Relevant Orders    Lipid Panel    Screening for diabetes mellitus        Relevant Orders    Glucose, Fasting    Hemoglobin A1C            Health Maintenance Topics with due status: Not Due       Topic Last Completion Date    TETANUS VACCINE 01/21/2022    Hemoglobin A1c (Diabetic Prevention Screening) 02/07/2022    Lipid Panel 02/07/2022    Influenza Vaccine 11/11/2022       Future Appointments   Date Time Provider Department Center   12/18/2023  1:30 PM Ricardo Trevizo DO [facility]   8/22/2024  1:30 PM Rocio Talamantes NP [facility]        Health Maintenance Due   Topic Date Due    Colorectal Cancer Screening  Never done        No follow-ups on file.     Signature:  Rocio Talamantes NP  [facility]  [id number] [place] [address], [place]  [address]    Date of encounter: 8/22/23

## 2023-08-22 NOTE — PROGRESS NOTES
Subjective     Patient ID: Hemanth Cottrell is a 43 y.o. male.    Chief Complaint: Healthy You    43 year old male presents to clinic for Healthy You visit. He states he has been doing well and denies problems. He is up to date on Health Maintenance with exception of Colorectal screening. He reports he has had colonoscopy done in past at [facility]. He thinks it was less than 5 years ago. Will request records.         Review of Systems   Constitutional:  Negative for activity change, appetite change, fatigue and fever.   HENT:  Negative for nasal congestion, ear pain, rhinorrhea, sinus pressure/congestion and sore throat.    Eyes:  Negative for pain, redness, visual disturbance and eye dryness.   Respiratory:  Negative for cough and shortness of breath.    Cardiovascular:  Negative for chest pain and leg swelling.   Gastrointestinal:  Negative for abdominal distention, abdominal pain, constipation and diarrhea.   Endocrine: Negative for cold intolerance, heat intolerance and polyuria.   Genitourinary:  Negative for bladder incontinence, dysuria, frequency and urgency.   Musculoskeletal:  Negative for arthralgias, gait problem and myalgias.   Integumentary:  Negative for color change, rash and wound.   Allergic/Immunologic: Negative for environmental allergies and food allergies.   Neurological:  Negative for dizziness, weakness, light-headedness and headaches.   Psychiatric/Behavioral:  Negative for behavioral problems and sleep disturbance.        Tobacco Use: Low Risk  (8/22/2023)    Patient History     Smoking Tobacco Use: Never     Smokeless Tobacco Use: Never     Passive Exposure: Never     Review of patient's allergies indicates:  No Known Allergies  Current Outpatient Medications   Medication Instructions    dextroamphetamine-amphetamine (ADDERALL) 20 mg tablet 1 tablet, Oral, 2 times daily    dextroamphetamine-amphetamine (ADDERALL) 20 mg tablet 1 tablet, Oral, 2 times daily     "dextroamphetamine-amphetamine (ADDERALL) 20 mg tablet 1 tablet, Oral, 2 times daily    dextroamphetamine-amphetamine (ADDERALL) 20 mg tablet 1 tablet, Oral, 2 times daily    dextroamphetamine-amphetamine (ADDERALL) 20 mg tablet 1 tablet, Oral, 2 times daily    ergocalciferol (ERGOCALCIFEROL) 50,000 Units, Oral, Every 7 days    valACYclovir (VALTREX) 500 mg, Oral, 2 times daily     There are no discontinued medications.    Past Medical History:   Diagnosis Date    Achilles tendinitis     Allergic rhinitis     Anxiety disorder     COVID-19 08/2021    x 2 (02/2021)    Depressive disorder     GERD (gastroesophageal reflux disease)     Recurrent genital herpes simplex type 2 infection     Taking multiple medications for chronic disease     Urinary retention 05/05/2022    PVR per ED 5/1/22 337 ml     Health Maintenance Topics with due status: Not Due       Topic Last Completion Date    TETANUS VACCINE 01/21/2022    Hemoglobin A1c (Diabetic Prevention Screening) 02/07/2022    Lipid Panel 02/07/2022    Influenza Vaccine 11/11/2022     Immunization History   Administered Date(s) Administered    COVID-19, MRNA, LN-S, PF (MODERNA FULL 0.5 ML DOSE) 12/01/2020, 12/22/2020    Influenza 11/01/2018    Influenza - Quadrivalent - PF *Preferred* (6 months and older) 11/11/2022    PPD Test 01/14/2022, 12/27/2022    Tdap 01/21/2022       Objective     Body mass index is 33.25 kg/m².  Wt Readings from Last 3 Encounters:   08/22/23 108.1 kg (238 lb 6.4 oz)   07/31/23 106.1 kg (234 lb)   04/19/23 99.8 kg (220 lb)     Ht Readings from Last 3 Encounters:   08/22/23 5' 11" (1.803 m)   07/31/23 5' 11" (1.803 m)   04/19/23 5' 11" (1.803 m)     BP Readings from Last 3 Encounters:   08/22/23 (!) 142/74   07/31/23 (!) 144/84   04/19/23 (!) 164/86     Temp Readings from Last 3 Encounters:   08/22/23 98 °F (36.7 °C) (Oral)   07/31/23 97.7 °F (36.5 °C) (Oral)   04/19/23 98.1 °F (36.7 °C) (Oral)     Pulse Readings from Last 3 Encounters:   08/22/23 89 "   07/31/23 92   04/19/23 89     Resp Readings from Last 3 Encounters:   08/22/23 18   07/31/23 19   04/19/23 18     PF Readings from Last 3 Encounters:   No data found for PF       Physical Exam  Vitals and nursing note reviewed.   HENT:      Head: Normocephalic.      Right Ear: Tympanic membrane normal.      Left Ear: Tympanic membrane normal.      Nose: Nose normal.      Mouth/Throat:      Mouth: Mucous membranes are moist.      Pharynx: Oropharynx is clear. No posterior oropharyngeal erythema.   Eyes:      Conjunctiva/sclera: Conjunctivae normal.   Cardiovascular:      Rate and Rhythm: Normal rate and regular rhythm.      Pulses: Normal pulses.      Heart sounds: Normal heart sounds.   Pulmonary:      Effort: Pulmonary effort is normal.      Breath sounds: Normal breath sounds.   Abdominal:      General: Abdomen is flat. Bowel sounds are normal. There is no distension.      Palpations: Abdomen is soft.   Musculoskeletal:         General: No swelling or tenderness. Normal range of motion.      Cervical back: Normal range of motion.      Right lower leg: No edema.      Left lower leg: No edema.   Skin:     General: Skin is warm and dry.      Capillary Refill: Capillary refill takes less than 2 seconds.   Neurological:      Mental Status: He is alert. Mental status is at baseline.   Psychiatric:         Mood and Affect: Mood normal.         Behavior: Behavior normal.         Assessment and Plan     Problem List Items Addressed This Visit    None  Visit Diagnoses       Routine general medical examination at a health care facility    -  Primary    Screening for diabetes mellitus        Relevant Orders    Glucose, Fasting    Hemoglobin A1C    Screening for lipoid disorders        Relevant Orders    Lipid Panel            Plan:   Instructed patient to keep appts as scheduled.   Instructed on DASH diet and increased exercise. Recommended at least 150 minutes a week with resistance training as tolerated. Monitor weight  and try to lose some.   Instructed on importance of taking all medications as prescribed.   Discussed yearly dental and eye exams.    Discussed use of sun screen, helmets and seat belts.  Gun safety discussed  Sleep discussed  Stay away from tobacco products    Discussed and provided with a screening schedule and personal prevention plan in accordance with USPSTF age appropriate recommendations and screening guidelines.   Education given and reviewed with patient. Counseling and referrals were provided as needed.  After Visit Summary printed and given to patient which includes written education and a list of any referrals if indicated.            I have reviewed the medications, allergies, and problem list.     Goal Actions:    What type of visit is the patient here for today?: Healthy You  Does the patient consent to enroll in Color Me Healthy?: Yes  Is this a Wellness Follow Up?: No  What is your overall wellness goal? (select at least one): Lifestyle modifications, Understand disease process, Improve overall health  Choose 3: Biometric, Lifestyle, Nutrition  Biometric Actions: Attend regularly scheduled office visits  Lifestyle Actions : Schedule colonoscopy, sigmoidoscopy, or Colorguard if applicable, Take medications as prescribed, Develop good support system  Nurtrition Actions: Read nutrition labels, Avoid carbonated beverages, drink 8-10 glasses of water per day

## 2023-08-23 ENCOUNTER — PATIENT OUTREACH (OUTPATIENT)
Dept: ADMINISTRATIVE | Facility: HOSPITAL | Age: 43
End: 2023-08-23

## 2023-08-23 LAB
CHOLEST SERPL-MCNC: 161 MG/DL (ref 0–200)
CHOLEST/HDLC SERPL: 3.4 {RATIO}
EST. AVERAGE GLUCOSE BLD GHB EST-MCNC: 81 MG/DL
GLUCOSE SERPL-MCNC: 76 MG/DL (ref 74–106)
HBA1C MFR BLD HPLC: 5 % (ref 4.5–6.6)
HDLC SERPL-MCNC: 48 MG/DL (ref 40–60)
LDLC SERPL CALC-MCNC: 88 MG/DL
LDLC/HDLC SERPL: 1.8 {RATIO}
NONHDLC SERPL-MCNC: 113 MG/DL
TRIGL SERPL-MCNC: 123 MG/DL (ref 35–150)
VLDLC SERPL-MCNC: 25 MG/DL

## 2023-08-23 PROCEDURE — 82947 ASSAY GLUCOSE BLOOD QUANT: CPT | Mod: ,,, | Performed by: CLINICAL MEDICAL LABORATORY

## 2023-08-23 PROCEDURE — 80061 LIPID PANEL: ICD-10-PCS | Mod: ,,, | Performed by: CLINICAL MEDICAL LABORATORY

## 2023-08-23 PROCEDURE — 83036 HEMOGLOBIN GLYCOSYLATED A1C: CPT | Mod: ,,, | Performed by: CLINICAL MEDICAL LABORATORY

## 2023-08-23 PROCEDURE — 82947 GLUCOSE, FASTING: ICD-10-PCS | Mod: ,,, | Performed by: CLINICAL MEDICAL LABORATORY

## 2023-08-23 PROCEDURE — 83036 HEMOGLOBIN A1C: ICD-10-PCS | Mod: ,,, | Performed by: CLINICAL MEDICAL LABORATORY

## 2023-08-23 PROCEDURE — 80061 LIPID PANEL: CPT | Mod: ,,, | Performed by: CLINICAL MEDICAL LABORATORY

## 2023-08-23 NOTE — PROGRESS NOTES
Post visit Population Health review of encounter with date of service  8/22/23 with Dar.  All required HY components in encounter.  Added myself to yefri Flores  Followup appt for:  8/22/24 HY

## 2023-08-24 NOTE — PROGRESS NOTES
Labs reviewed: Cholesterol looks good, better than it was a year ago. Hgb A1C was 5.0. Glucose normal. Continue current plan of care.

## 2023-08-28 ENCOUNTER — PATIENT OUTREACH (OUTPATIENT)
Dept: ADMINISTRATIVE | Facility: HOSPITAL | Age: 43
End: 2023-08-28

## 2023-08-28 NOTE — LETTER
AUTHORIZATION FOR RELEASE OF   CONFIDENTIAL INFORMATION    Dear Pérez Meadows,    We are seeing Hemanth Cottrell, date of birth 1980, in the clinic at [facility]. Rocio Talamantes NP is the patient's PCP. Hemanth Cottrell has an outstanding lab/procedure at the time we reviewed his chart. In order to help keep his health information updated, he has authorized us to request the following medical record(s):        (  )  MAMMOGRAM                                      (X)  COLONOSCOPY      (  )  PAP SMEAR                                          (  )  OUTSIDE LAB RESULTS     (  )  DEXA SCAN                                          (  )  EYE EXAM            (  )  FOOT EXAM                                          (  )  ENTIRE RECORD     (  )  OUTSIDE IMMUNIZATIONS                 (  )  _______________         Please fax records to [facility], Bette Shelley, [phone].     If you have any questions, please contact [phone].          Patient Name: Hemanth Cottrell  : 1980  Patient Phone #: [phone]

## 2023-08-28 NOTE — LETTER
AUTHORIZATION FOR RELEASE OF   CONFIDENTIAL INFORMATION    Dear Pérez Meadows,    We are seeing Hemanth Cottrell, date of birth 1980, in the clinic at [facility]. Rocio Talamantes NP is the patient's PCP. Hemanth Cottrell has an outstanding lab/procedure at the time we reviewed his chart. In order to help keep his health information updated, he has authorized us to request the following medical record(s):        (  )  MAMMOGRAM                                      (X)  COLONOSCOPY      (  )  PAP SMEAR                                          (  )  OUTSIDE LAB RESULTS     (  )  DEXA SCAN                                          (  )  EYE EXAM            (  )  FOOT EXAM                                          (  )  ENTIRE RECORD     (  )  OUTSIDE IMMUNIZATIONS                 (  )  _______________         Please fax records to [facility], Bette Shelley, [phone].     If you have any questions, please contact [phone].          Patient Name: Hemanth Cottrell  : 1980  Patient Phone #: [phone]

## 2023-08-31 ENCOUNTER — PATIENT OUTREACH (OUTPATIENT)
Dept: ADMINISTRATIVE | Facility: HOSPITAL | Age: 43
End: 2023-08-31

## 2023-08-31 NOTE — PROGRESS NOTES
Pt stated he had colonoscopy at [facility]. Documents were requested. [facility] stated colonoscopy was not found. Documents have been uploaded into , and Albin is aware.

## 2023-09-22 DIAGNOSIS — R17 ELEVATED BILIRUBIN: Primary | ICD-10-CM

## 2023-09-27 ENCOUNTER — OFFICE VISIT (OUTPATIENT)
Dept: VASCULAR SURGERY | Facility: CLINIC | Age: 43
End: 2023-09-27
Payer: COMMERCIAL

## 2023-09-27 DIAGNOSIS — K82.4 GALLBLADDER POLYP: ICD-10-CM

## 2023-09-27 DIAGNOSIS — K80.50 BILIARY COLIC: Primary | ICD-10-CM

## 2023-09-27 PROCEDURE — 99213 OFFICE O/P EST LOW 20 MIN: CPT | Mod: PBBFAC | Performed by: SURGERY

## 2023-09-27 PROCEDURE — 1160F PR REVIEW ALL MEDS BY PRESCRIBER/CLIN PHARMACIST DOCUMENTED: ICD-10-PCS | Mod: ,,, | Performed by: SURGERY

## 2023-09-27 PROCEDURE — 3044F PR MOST RECENT HEMOGLOBIN A1C LEVEL <7.0%: ICD-10-PCS | Mod: ,,, | Performed by: SURGERY

## 2023-09-27 PROCEDURE — 3044F HG A1C LEVEL LT 7.0%: CPT | Mod: ,,, | Performed by: SURGERY

## 2023-09-27 PROCEDURE — 1159F PR MEDICATION LIST DOCUMENTED IN MEDICAL RECORD: ICD-10-PCS | Mod: ,,, | Performed by: SURGERY

## 2023-09-27 PROCEDURE — 1160F RVW MEDS BY RX/DR IN RCRD: CPT | Mod: ,,, | Performed by: SURGERY

## 2023-09-27 PROCEDURE — 1159F MED LIST DOCD IN RCRD: CPT | Mod: ,,, | Performed by: SURGERY

## 2023-09-27 PROCEDURE — 99204 OFFICE O/P NEW MOD 45 MIN: CPT | Mod: S$PBB,,, | Performed by: SURGERY

## 2023-09-27 PROCEDURE — 99204 PR OFFICE/OUTPT VISIT, NEW, LEVL IV, 45-59 MIN: ICD-10-PCS | Mod: S$PBB,,, | Performed by: SURGERY

## 2023-09-27 RX ORDER — ONDANSETRON 2 MG/ML
4 INJECTION INTRAMUSCULAR; INTRAVENOUS EVERY 12 HOURS PRN
Status: CANCELLED | OUTPATIENT
Start: 2023-09-27

## 2023-09-27 RX ORDER — SODIUM CHLORIDE 9 MG/ML
INJECTION, SOLUTION INTRAVENOUS CONTINUOUS
Status: CANCELLED | OUTPATIENT
Start: 2023-09-27

## 2023-09-27 RX ORDER — ONDANSETRON 4 MG/1
8 TABLET, ORALLY DISINTEGRATING ORAL EVERY 8 HOURS PRN
Status: CANCELLED | OUTPATIENT
Start: 2023-09-27

## 2023-09-27 NOTE — PROGRESS NOTES
Subjective:       Patient ID: Hemanth Cottrell is a 43 y.o. male.    Chief Complaint: No chief complaint on file.  New patient.  Classic biliary colic symptoms nausea epigastric discomfort brought on by fatty foods.  He is had a gallbladder ultrasound in [place].  He reports sludge, gallbladder polyp, thickened gallbladder wall mild.  Denies ductal dilatation.  Recent liver enzymes are normal there was bili was slightly elevated.  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  Past Medical History:   Diagnosis Date    Achilles tendinitis     Allergic rhinitis     Anxiety disorder     COVID-19 08/2021    x 2 (02/2021)    Depressive disorder     GERD (gastroesophageal reflux disease)     Recurrent genital herpes simplex type 2 infection     Taking multiple medications for chronic disease     Urinary retention 05/05/2022    PVR per ED 5/1/22 337 ml      Past Surgical History:   Procedure Laterality Date    COLONOSCOPY      ESOPHAGOGASTRODUODENOSCOPY      KNEE ARTHROSCOPY Left 2000    VASECTOMY  2005       reports that he has never smoked. He has never been exposed to tobacco smoke. He has never used smokeless tobacco. He reports current alcohol use. He reports that he does not use drugs.   HPI  Review of Systems      Objective:      There were no vitals taken for this visit.   Physical Exam  Constitutional:       Appearance: Normal appearance.   Cardiovascular:      Rate and Rhythm: Normal rate.   Pulmonary:      Effort: Pulmonary effort is normal.   Abdominal:      General: Abdomen is flat.      Tenderness: There is no abdominal tenderness.      Hernia: No hernia is present.   Skin:      General: Skin is warm.      Capillary Refill: Capillary refill takes less than 2 seconds.   Neurological:      General: No focal deficit present.      Mental Status: He is alert.           Assessment:       1. Biliary colic    2. Gallbladder polyp        Plan:       Obtain a copy of his ultrasound.  Pros and cons of lap choly discussed bleeding infection damage to biliary

## 2023-09-27 NOTE — PATIENT INSTRUCTIONS
[facility]      Your surgery is scheduled for 10/17 at [facility] on the ground floor of the Ambulatory building. You should arrive at 8:30 the Ambulatory Care Center located at [address].                                                                                                                                                                                                                                                                                                                                                                                                                                                                                               Day of Surgery Instructions      Bring a list of all your medications with you the day of your surgery. You can also give the list to your doctor or nurse during your final clinic appointment before surgery.      Do not eat any solid foods or drink any liquids after 12:00 AM (midnight). This includes gum, hard candy, mints, and chewing tobacco.  Medications: Take any medications specified with a small sip of water the morning of your surgery.  Brush your teeth: You may brush your teeth and rinse your mouth. Do not swallow any water or toothpaste.  Clothing: A button front shirt and loose-fitting clothes are the most comfortable before and after surgery. We also recommend low-heeled shoes.  Hair: Avoid buns, ponytails, or hairpieces at the back of the head. Remove or avoid any clips, pins or bands that bind hair. Do not use hairspray. Before going to surgery, you will need to remove any wigs or hairpieces.  We will cover your hair during surgery. Your privacy regarding personal appearance will be respected.  Fingernails: Please be sure to remove all nail polish before you arrive for surgery. We understand that tips, wraps, gels, etc., are expensive; however, we ask these products to be removed from at least one finger on  each hand. Your fingertips are used to accurately monitor your oxygen level during surgery by a device called an oximeter.  Glasses and Contact Lenses: Wear glasses when possible. If contact lenses must be worn, bring a lens case and solution. If glasses are worn, bring a case for them.  Hearing Aids: If you rely on a hearing aid, wear it to the hospital on the day of surgery. This will ensure you can hear and understand everything we need to communicate with you.  Valuables: Jewelry, including body piercings, Dentures, money, and credit cards should be left at home. [facility] is not responsible for valuables that are not secured in our surgery center.  Makeup, Perfume, Creams, Lotions and Deodorants: Do not use any of these products on the day of surgery. Remove false eyelashes prior to surgery.  Implanted Medical Devices: If you have an implanted device, such as a pacemaker or AICD, bring the device information card (if you have it) with you.  Medical Equipment: If you have been fitted for a brace to wear after surgery or you have been given crutches, bring those with you to the surgery center.  Shower: Take a shower with Hibiclens® (chlorhexidine) (available over the counter). This reduces the chance of infection. PLEASE USE CHLORHEXIDINE WASH THE NIGHT BEFORE SURGERY AND THE MORNING OF SURGERY.      Medication instructions:  You may take blood pressure medication with a small drink of water the morning of surgery.      IF YOU ARE ON ANY OF THESE BLOOD THINNERS, MAKE SURE YOUR PHYSICIAN IS AWARE.  Eliquis/Apixaban            Wafarin/Coumadin,Jantoven  Xarelto/Rivaroxaban      Pletal/Cilostazol  Plavix/Clopidogrel          Pradaxa/Dibigatran      If you are diabetic      Follow the diabetic medicine instructions you received during your pre-operative visit.  DO NOT take your insulin or diabetic medications the morning of surgery.  When you arrive at the surgical center, be sure to tell the nurse you are  diabetic.    The following blood sugar medications have to be stopped prior to surgery:    Hold 24 hours prior to surgery:    Libraglutide - Saxenda   Adlyxen - Lixixerutose  Byetta - Exenatide  Saxenda - Liralutide   Victoza    Hold 1 week prior to surgery:    Semaglutide - Ozempic, Wegouy, Rybelsus  Dulaglutide - Trulicity  Tiazepatide - Mounjaro  Bydurcon    Hold 48 hours prior to surgery:    Metformin, Glucovance, Metaglip, Fortamet, Glucophage, Riomet, Avandamet, Glimepiride            Other Items to bring with you and know      Insurance card  Identification card such as 's license, passport, or other picture ID  Copy of your advance directives  List of medications and allergies, if not already provided  Name and phone number of person to contact if your condition changes significantly. YOU CANNOT DRIVE YOURSELF HOME FROM THE HOSPITAL THE DAY OF SURGERY.  PLEASE UNDERSTAND THAT OUR OFFICE DOES NOT GIVE PATHOLOGY RESULTS OR TEST RESULTS OVER THE PHONE. THIS WILL BE DISCUSSED WITH YOU ON YOUR FOLLOW UP APPOINTMENT.          Alcohol and Surgery  We want to help you prepare for and recover from surgery as quickly and safely as possible. Be open and honest with your provider about how many drinks you have per day. Excessive alcohol use is defined as drinking more than three drinks per day. It can affect the outcome of your surgery. Binge drinking (consuming large amounts of alcohol infrequently, such as on weekends) can also affect the outcome of your surgery.  Alcohol withdrawal  If you drink more than three drinks a day, you could have a complication, called alcohol withdrawal, after surgery.  Alcohol withdrawal is a set of symptoms that people have when they suddenly stop drinking after using alcohol  for a long time. During withdrawal, a person's central nervous system overreacts. This can cause mild symptoms such as shakiness, sweating or hallucinating. It can also cause other more serious side effects.  If not treated properly, alcohol withdrawal can cause potentially life-threatening complications after surgery. This can include tremors, seizures, hallucinations, delirium tremors, and even death. Untreated alcohol withdrawal often leads to a longer stay in the hospital, potentially in the Intensive Care Unit.  Chronic heavy drinking also can interfere with several organ systems and biochemical processes in the body.  This interference can cause serious, even life-threatening complications.  Your care team can offer alcohol withdrawal treatment to help:  Decrease the risk of seizures and delirium tremors after surgery  Decrease the risk we will need to restrain you for your own safety or the safety of others  Decrease your risk of falling after surgery  Reduce the use of potent sedative medications  Reduce the time you stay in the hospital after surgery  Reduce the time you might spend on a mechanical ventilator to help you breathe  Lower incidence of organ failure and biochemical complications  Talk to a member of your care team or your primary care physician about your alcohol use if you feel you may be at risk of any of these complications.        Smoking and Surgery  Quitting smoking is extremely important for a successful surgery and recovery. Cigarette smoking compromises your immune system. This increases your risk of an infection after surgery. Quitting the habit before surgery will decrease the surgical risks associated with smoking.

## 2023-10-30 ENCOUNTER — ANESTHESIA EVENT (OUTPATIENT)
Dept: SURGERY | Facility: HOSPITAL | Age: 43
End: 2023-10-30
Payer: COMMERCIAL

## 2023-10-30 ENCOUNTER — ANESTHESIA (OUTPATIENT)
Dept: SURGERY | Facility: HOSPITAL | Age: 43
End: 2023-10-30
Payer: COMMERCIAL

## 2023-10-30 ENCOUNTER — HOSPITAL ENCOUNTER (OUTPATIENT)
Facility: HOSPITAL | Age: 43
Discharge: HOME | End: 2023-10-30
Attending: SURGERY | Admitting: SURGERY
Payer: COMMERCIAL

## 2023-10-30 VITALS
RESPIRATION RATE: 16 BRPM | OXYGEN SATURATION: 95 % | BODY MASS INDEX: 32.2 KG/M2 | HEART RATE: 84 BPM | TEMPERATURE: 98 F | WEIGHT: 230 LBS | DIASTOLIC BLOOD PRESSURE: 84 MMHG | SYSTOLIC BLOOD PRESSURE: 127 MMHG | HEIGHT: 71 IN

## 2023-10-30 DIAGNOSIS — K80.50 BILIARY COLIC: Primary | ICD-10-CM

## 2023-10-30 LAB
ALBUMIN SERPL BCP-MCNC: 3.7 G/DL (ref 3.5–5)
ALBUMIN/GLOB SERPL: 1.2 {RATIO}
ALP SERPL-CCNC: 67 U/L (ref 45–115)
ALT SERPL W P-5'-P-CCNC: 37 U/L (ref 16–61)
ANION GAP SERPL CALCULATED.3IONS-SCNC: 9 MMOL/L (ref 7–16)
AST SERPL W P-5'-P-CCNC: 20 U/L (ref 15–37)
BASOPHILS # BLD AUTO: 0.02 K/UL (ref 0–0.2)
BASOPHILS NFR BLD AUTO: 0.4 % (ref 0–1)
BILIRUB SERPL-MCNC: 0.9 MG/DL (ref ?–1.2)
BUN SERPL-MCNC: 9 MG/DL (ref 7–18)
BUN/CREAT SERPL: 10 (ref 6–20)
CALCIUM SERPL-MCNC: 8.7 MG/DL (ref 8.5–10.1)
CHLORIDE SERPL-SCNC: 107 MMOL/L (ref 98–107)
CO2 SERPL-SCNC: 28 MMOL/L (ref 21–32)
CREAT SERPL-MCNC: 0.94 MG/DL (ref 0.7–1.3)
DIFFERENTIAL METHOD BLD: ABNORMAL
EGFR (NO RACE VARIABLE) (RUSH/TITUS): 102 ML/MIN/1.73M2
EOSINOPHIL # BLD AUTO: 0.2 K/UL (ref 0–0.5)
EOSINOPHIL NFR BLD AUTO: 4.1 % (ref 1–4)
ERYTHROCYTE [DISTWIDTH] IN BLOOD BY AUTOMATED COUNT: 12 % (ref 11.5–14.5)
GLOBULIN SER-MCNC: 3 G/DL (ref 2–4)
GLUCOSE SERPL-MCNC: 101 MG/DL (ref 74–106)
HCT VFR BLD AUTO: 45.7 % (ref 40–54)
HGB BLD-MCNC: 16.1 G/DL (ref 13.5–18)
IMM GRANULOCYTES # BLD AUTO: 0.02 K/UL (ref 0–0.04)
IMM GRANULOCYTES NFR BLD: 0.4 % (ref 0–0.4)
LYMPHOCYTES # BLD AUTO: 1.77 K/UL (ref 1–4.8)
LYMPHOCYTES NFR BLD AUTO: 36.3 % (ref 27–41)
MCH RBC QN AUTO: 30.8 PG (ref 27–31)
MCHC RBC AUTO-ENTMCNC: 35.2 G/DL (ref 32–36)
MCV RBC AUTO: 87.4 FL (ref 80–96)
MONOCYTES # BLD AUTO: 0.44 K/UL (ref 0–0.8)
MONOCYTES NFR BLD AUTO: 9 % (ref 2–6)
MPC BLD CALC-MCNC: 9.4 FL (ref 9.4–12.4)
NEUTROPHILS # BLD AUTO: 2.42 K/UL (ref 1.8–7.7)
NEUTROPHILS NFR BLD AUTO: 49.8 % (ref 53–65)
NRBC # BLD AUTO: 0 X10E3/UL
NRBC, AUTO (.00): 0 %
PLATELET # BLD AUTO: 184 K/UL (ref 150–400)
POTASSIUM SERPL-SCNC: 4.1 MMOL/L (ref 3.5–5.1)
PROT SERPL-MCNC: 6.7 G/DL (ref 6.4–8.2)
RBC # BLD AUTO: 5.23 M/UL (ref 4.6–6.2)
SODIUM SERPL-SCNC: 140 MMOL/L (ref 136–145)
WBC # BLD AUTO: 4.87 K/UL (ref 4.5–11)

## 2023-10-30 PROCEDURE — 63600175 PHARM REV CODE 636 W HCPCS: Performed by: ANESTHESIOLOGY

## 2023-10-30 PROCEDURE — 63600175 PHARM REV CODE 636 W HCPCS: Performed by: NURSE ANESTHETIST, CERTIFIED REGISTERED

## 2023-10-30 PROCEDURE — 36000708 HC OR TIME LEV III 1ST 15 MIN: Performed by: SURGERY

## 2023-10-30 PROCEDURE — D9220A PRA ANESTHESIA: Mod: ANES,,, | Performed by: ANESTHESIOLOGY

## 2023-10-30 PROCEDURE — 71000016 HC POSTOP RECOV ADDL HR: Performed by: SURGERY

## 2023-10-30 PROCEDURE — 88304 TISSUE EXAM BY PATHOLOGIST: CPT | Mod: TC,SUR | Performed by: SURGERY

## 2023-10-30 PROCEDURE — 63600175 PHARM REV CODE 636 W HCPCS: Performed by: NURSE PRACTITIONER

## 2023-10-30 PROCEDURE — 47562 PR LAP,CHOLECYSTECTOMY: ICD-10-PCS | Mod: ,,, | Performed by: SURGERY

## 2023-10-30 PROCEDURE — 47562 LAPAROSCOPIC CHOLECYSTECTOMY: CPT | Mod: ,,, | Performed by: SURGERY

## 2023-10-30 PROCEDURE — 85025 COMPLETE CBC W/AUTO DIFF WBC: CPT | Performed by: NURSE PRACTITIONER

## 2023-10-30 PROCEDURE — 88304 TISSUE EXAM BY PATHOLOGIST: CPT | Mod: 26,,, | Performed by: PATHOLOGY

## 2023-10-30 PROCEDURE — 27201423 OPTIME MED/SURG SUP & DEVICES STERILE SUPPLY: Performed by: SURGERY

## 2023-10-30 PROCEDURE — D9220A PRA ANESTHESIA: Mod: CRNA,,, | Performed by: NURSE ANESTHETIST, CERTIFIED REGISTERED

## 2023-10-30 PROCEDURE — 99223 PR INITIAL HOSPITAL CARE,LEVL III: ICD-10-PCS | Mod: 57,,, | Performed by: SURGERY

## 2023-10-30 PROCEDURE — 27000716 HC OXISENSOR PROBE, ANY SIZE: Performed by: ANESTHESIOLOGY

## 2023-10-30 PROCEDURE — 27000655: Performed by: ANESTHESIOLOGY

## 2023-10-30 PROCEDURE — D9220A PRA ANESTHESIA: ICD-10-PCS | Mod: ANES,,, | Performed by: ANESTHESIOLOGY

## 2023-10-30 PROCEDURE — 27000689 HC BLADE LARYNGOSCOPE ANY SIZE: Performed by: ANESTHESIOLOGY

## 2023-10-30 PROCEDURE — 27000165 HC TUBE, ETT CUFFED: Performed by: ANESTHESIOLOGY

## 2023-10-30 PROCEDURE — 25000003 PHARM REV CODE 250: Performed by: NURSE ANESTHETIST, CERTIFIED REGISTERED

## 2023-10-30 PROCEDURE — 37000009 HC ANESTHESIA EA ADD 15 MINS: Performed by: SURGERY

## 2023-10-30 PROCEDURE — 80053 COMPREHEN METABOLIC PANEL: CPT | Performed by: NURSE PRACTITIONER

## 2023-10-30 PROCEDURE — 99223 1ST HOSP IP/OBS HIGH 75: CPT | Mod: 57,,, | Performed by: SURGERY

## 2023-10-30 PROCEDURE — 37000008 HC ANESTHESIA 1ST 15 MINUTES: Performed by: SURGERY

## 2023-10-30 PROCEDURE — 88304 SURGICAL PATHOLOGY: ICD-10-PCS | Mod: 26,,, | Performed by: PATHOLOGY

## 2023-10-30 PROCEDURE — 25000003 PHARM REV CODE 250: Performed by: NURSE PRACTITIONER

## 2023-10-30 PROCEDURE — 71000015 HC POSTOP RECOV 1ST HR: Performed by: SURGERY

## 2023-10-30 PROCEDURE — D9220A PRA ANESTHESIA: ICD-10-PCS | Mod: CRNA,,, | Performed by: NURSE ANESTHETIST, CERTIFIED REGISTERED

## 2023-10-30 PROCEDURE — 36000709 HC OR TIME LEV III EA ADD 15 MIN: Performed by: SURGERY

## 2023-10-30 PROCEDURE — 71000033 HC RECOVERY, INTIAL HOUR: Performed by: SURGERY

## 2023-10-30 PROCEDURE — 27000510 HC BLANKET BAIR HUGGER ANY SIZE: Performed by: ANESTHESIOLOGY

## 2023-10-30 PROCEDURE — 27000509 HC TUBE SALEM SUMP ANY SIZE: Performed by: ANESTHESIOLOGY

## 2023-10-30 RX ORDER — DEXAMETHASONE SODIUM PHOSPHATE 4 MG/ML
INJECTION, SOLUTION INTRA-ARTICULAR; INTRALESIONAL; INTRAMUSCULAR; INTRAVENOUS; SOFT TISSUE
Status: DISCONTINUED | OUTPATIENT
Start: 2023-10-30 | End: 2023-10-30

## 2023-10-30 RX ORDER — HYDROCODONE BITARTRATE AND ACETAMINOPHEN 5; 325 MG/1; MG/1
1 TABLET ORAL EVERY 6 HOURS PRN
Qty: 12 TABLET | Refills: 0 | Status: SHIPPED | OUTPATIENT
Start: 2023-10-30 | End: 2023-11-21

## 2023-10-30 RX ORDER — SODIUM CHLORIDE 9 MG/ML
INJECTION, SOLUTION INTRAVENOUS CONTINUOUS
Status: DISCONTINUED | OUTPATIENT
Start: 2023-10-30 | End: 2023-10-30 | Stop reason: HOSPADM

## 2023-10-30 RX ORDER — ONDANSETRON 2 MG/ML
4 INJECTION INTRAMUSCULAR; INTRAVENOUS DAILY PRN
Status: DISCONTINUED | OUTPATIENT
Start: 2023-10-30 | End: 2023-10-30 | Stop reason: HOSPADM

## 2023-10-30 RX ORDER — HYDROCODONE BITARTRATE AND ACETAMINOPHEN 5; 325 MG/1; MG/1
1 TABLET ORAL EVERY 4 HOURS PRN
Status: DISCONTINUED | OUTPATIENT
Start: 2023-10-30 | End: 2023-10-30 | Stop reason: HOSPADM

## 2023-10-30 RX ORDER — DIMENHYDRINATE 50 MG
50 TABLET ORAL ONCE
Status: DISCONTINUED | OUTPATIENT
Start: 2023-10-30 | End: 2023-10-30 | Stop reason: HOSPADM

## 2023-10-30 RX ORDER — MIDAZOLAM HYDROCHLORIDE 1 MG/ML
INJECTION INTRAMUSCULAR; INTRAVENOUS
Status: DISCONTINUED | OUTPATIENT
Start: 2023-10-30 | End: 2023-10-30

## 2023-10-30 RX ORDER — ROCURONIUM BROMIDE 10 MG/ML
INJECTION, SOLUTION INTRAVENOUS
Status: DISCONTINUED | OUTPATIENT
Start: 2023-10-30 | End: 2023-10-30

## 2023-10-30 RX ORDER — HYDROMORPHONE HYDROCHLORIDE 2 MG/ML
0.5 INJECTION, SOLUTION INTRAMUSCULAR; INTRAVENOUS; SUBCUTANEOUS EVERY 5 MIN PRN
Status: DISCONTINUED | OUTPATIENT
Start: 2023-10-30 | End: 2023-10-30 | Stop reason: HOSPADM

## 2023-10-30 RX ORDER — FENTANYL CITRATE 50 UG/ML
INJECTION, SOLUTION INTRAMUSCULAR; INTRAVENOUS
Status: DISCONTINUED | OUTPATIENT
Start: 2023-10-30 | End: 2023-10-30

## 2023-10-30 RX ORDER — OMEPRAZOLE 20 MG/1
20 CAPSULE, DELAYED RELEASE ORAL DAILY
COMMUNITY

## 2023-10-30 RX ORDER — ONDANSETRON 4 MG/1
8 TABLET, ORALLY DISINTEGRATING ORAL EVERY 8 HOURS PRN
Status: DISCONTINUED | OUTPATIENT
Start: 2023-10-30 | End: 2023-10-30 | Stop reason: HOSPADM

## 2023-10-30 RX ORDER — LIDOCAINE HYDROCHLORIDE 20 MG/ML
INJECTION, SOLUTION EPIDURAL; INFILTRATION; INTRACAUDAL; PERINEURAL
Status: DISCONTINUED | OUTPATIENT
Start: 2023-10-30 | End: 2023-10-30

## 2023-10-30 RX ORDER — MEPERIDINE HYDROCHLORIDE 25 MG/ML
25 INJECTION INTRAMUSCULAR; INTRAVENOUS; SUBCUTANEOUS EVERY 10 MIN PRN
Status: DISCONTINUED | OUTPATIENT
Start: 2023-10-30 | End: 2023-10-30 | Stop reason: HOSPADM

## 2023-10-30 RX ORDER — PROPOFOL 10 MG/ML
VIAL (ML) INTRAVENOUS
Status: DISCONTINUED | OUTPATIENT
Start: 2023-10-30 | End: 2023-10-30

## 2023-10-30 RX ORDER — KETOROLAC TROMETHAMINE 30 MG/ML
INJECTION, SOLUTION INTRAMUSCULAR; INTRAVENOUS
Status: DISCONTINUED | OUTPATIENT
Start: 2023-10-30 | End: 2023-10-30

## 2023-10-30 RX ORDER — DIPHENHYDRAMINE HCL 25 MG
25 CAPSULE ORAL ONCE
Status: DISCONTINUED | OUTPATIENT
Start: 2023-10-30 | End: 2023-10-30 | Stop reason: HOSPADM

## 2023-10-30 RX ORDER — HYDROMORPHONE HYDROCHLORIDE 2 MG/ML
INJECTION, SOLUTION INTRAMUSCULAR; INTRAVENOUS; SUBCUTANEOUS
Status: DISCONTINUED | OUTPATIENT
Start: 2023-10-30 | End: 2023-10-30

## 2023-10-30 RX ORDER — DIPHENHYDRAMINE HYDROCHLORIDE 50 MG/ML
25 INJECTION INTRAMUSCULAR; INTRAVENOUS EVERY 6 HOURS PRN
Status: DISCONTINUED | OUTPATIENT
Start: 2023-10-30 | End: 2023-10-30 | Stop reason: HOSPADM

## 2023-10-30 RX ORDER — MORPHINE SULFATE 10 MG/ML
4 INJECTION INTRAMUSCULAR; INTRAVENOUS; SUBCUTANEOUS EVERY 5 MIN PRN
Status: DISCONTINUED | OUTPATIENT
Start: 2023-10-30 | End: 2023-10-30 | Stop reason: HOSPADM

## 2023-10-30 RX ORDER — ONDANSETRON 2 MG/ML
4 INJECTION INTRAMUSCULAR; INTRAVENOUS EVERY 12 HOURS PRN
Status: DISCONTINUED | OUTPATIENT
Start: 2023-10-30 | End: 2023-10-30 | Stop reason: HOSPADM

## 2023-10-30 RX ORDER — CEFAZOLIN SODIUM 1 G/3ML
INJECTION, POWDER, FOR SOLUTION INTRAMUSCULAR; INTRAVENOUS
Status: DISCONTINUED | OUTPATIENT
Start: 2023-10-30 | End: 2023-10-30

## 2023-10-30 RX ADMIN — FENTANYL CITRATE 100 MCG: 50 INJECTION INTRAMUSCULAR; INTRAVENOUS at 07:10

## 2023-10-30 RX ADMIN — SODIUM CHLORIDE: 9 INJECTION, SOLUTION INTRAVENOUS at 09:10

## 2023-10-30 RX ADMIN — CEFAZOLIN 2 G: 1 INJECTION, POWDER, FOR SOLUTION INTRAMUSCULAR; INTRAVENOUS; PARENTERAL at 08:10

## 2023-10-30 RX ADMIN — MORPHINE SULFATE 3 MG: 10 INJECTION, SOLUTION INTRAMUSCULAR; INTRAVENOUS at 09:10

## 2023-10-30 RX ADMIN — DEXAMETHASONE SODIUM PHOSPHATE 6 MG: 4 INJECTION, SOLUTION INTRA-ARTICULAR; INTRALESIONAL; INTRAMUSCULAR; INTRAVENOUS; SOFT TISSUE at 08:10

## 2023-10-30 RX ADMIN — MIDAZOLAM 2 MG: 1 INJECTION INTRAMUSCULAR; INTRAVENOUS at 07:10

## 2023-10-30 RX ADMIN — ROCURONIUM BROMIDE 50 MG: 10 INJECTION, SOLUTION INTRAVENOUS at 07:10

## 2023-10-30 RX ADMIN — ONDANSETRON 4 MG: 2 INJECTION INTRAMUSCULAR; INTRAVENOUS at 10:10

## 2023-10-30 RX ADMIN — KETOROLAC TROMETHAMINE 30 MG: 30 INJECTION, SOLUTION INTRAMUSCULAR at 08:10

## 2023-10-30 RX ADMIN — ONDANSETRON 4 MG: 2 INJECTION INTRAMUSCULAR; INTRAVENOUS at 08:10

## 2023-10-30 RX ADMIN — MORPHINE SULFATE 4 MG: 10 INJECTION, SOLUTION INTRAMUSCULAR; INTRAVENOUS at 09:10

## 2023-10-30 RX ADMIN — HYDROMORPHONE HYDROCHLORIDE 1 MG: 2 INJECTION, SOLUTION INTRAMUSCULAR; INTRAVENOUS; SUBCUTANEOUS at 08:10

## 2023-10-30 RX ADMIN — PROPOFOL 175 MG: 10 INJECTION, EMULSION INTRAVENOUS at 07:10

## 2023-10-30 RX ADMIN — LIDOCAINE HYDROCHLORIDE 50 MG: 20 INJECTION, SOLUTION INTRAVENOUS at 07:10

## 2023-10-30 RX ADMIN — SODIUM CHLORIDE: 9 INJECTION, SOLUTION INTRAVENOUS at 06:10

## 2023-10-30 RX ADMIN — SUGAMMADEX 200 MG: 100 INJECTION, SOLUTION INTRAVENOUS at 08:10

## 2023-10-30 NOTE — OP NOTE
[facility]  Surgery Department  Operative Note    SUMMARY     Date of Procedure: 10/30/2023     Procedure: Procedure(s) (LRB):  CHOLECYSTECTOMY, LAPAROSCOPIC (N/A)     Surgeon(s) and Role:     * Georgina Espinoza MD - Primary    Assisting Surgeon: None    Pre-Operative Diagnosis: Biliary colic [K80.50]    Post-Operative Diagnosis: Post-Op Diagnosis Codes:     * Biliary colic [K80.50]    Anesthesia: General    Operative Findings (including complications, if any):  Mild adhesions to the gallbladder    Description of Technical Procedures:  Taken operative suite abdomen prepped draped curvilinear infraumbilical incision made lateral stay sutures in the fascia we sharply in the abdomen blunt-tipped trocar was placed pneumoperitoneum assured.  The weighted the abdomen no obvious pathology was identified.  We grasped the gallbladder took down some mild adhesions of the greater omentum.  Grasped the fundus retracted cephalad the infundibulum laterally.  We scored the peritoneum dissected out identified cystic duct cystic artery clips proximally distally transected these structures.  Gallbladder was taken down retrograde fashion.  Hemostasis was assured placed the gallbladder within the Endo-Catch bag.  Brought it out through the umbilical site.  Released pneumoperitoneum trocars were removed fascial defect closed with 0 Vicryl skin was closed with 4-0 Monocryl sterile dressings applied    Significant Surgical Tasks Conducted by the Assistant(s), if Applicable:  Skin closure    Estimated Blood Loss (EBL): * No values recorded between 10/30/2023  8:11 AM and 10/30/2023  8:49 AM *5cc           Implants: * No implants in log *    Specimens:   Specimen (24h ago, onward)       Start     Ordered    10/30/23 0834  Surgical Pathology  RELEASE UPON ORDERING         10/30/23 0834                            Condition: Good    Disposition: PACU - hemodynamically stable.    Attestation: I was present and  scrubbed for the entire procedure.

## 2023-10-30 NOTE — ANESTHESIA POSTPROCEDURE EVALUATION
Anesthesia Post Evaluation    Patient: Hemanth Cottrell    Procedure(s) Performed: Procedure(s) (LRB):  CHOLECYSTECTOMY, LAPAROSCOPIC (N/A)    Final Anesthesia Type: general      Patient location during evaluation: PACU  Post-procedure vital signs: reviewed and stable  Pain management: adequate  Airway patency: patent    PONV status at discharge: No PONV  Anesthetic complications: no      Cardiovascular status: hemodynamically stable  Respiratory status: unassisted  Hydration status: euvolemic  Follow-up not needed.          Vitals Value Taken Time   /84 10/30/23 1131   Temp 36.7 °C (98 °F) 10/30/23 1145   Pulse 82 10/30/23 1137   Resp 16 10/30/23 0955   SpO2 92 % 10/30/23 1137   Vitals shown include unvalidated device data.      Event Time   Out of Recovery 09:50:00         Pain/Miracle Score: Pain Rating Prior to Med Admin: 4 (10/30/2023  9:35 AM)  Miracle Score: 10 (10/30/2023 11:50 AM)

## 2023-10-30 NOTE — H&P
[facility]  History & Physical    Subjective:      Chief Complaint/Reason for Admission: biliary colic    Hemanth Cottrell is a 43 y.o. male.  Polyp, thickened gb wall, classic symptoms, pros and cons of lap vs open cholecystectomy addressed  Past Medical History:   Diagnosis Date    Achilles tendinitis     Allergic rhinitis     Anxiety disorder     COVID-19 08/2021    x 2 (02/2021)    Depressive disorder     GERD (gastroesophageal reflux disease)     Recurrent genital herpes simplex type 2 infection     Taking multiple medications for chronic disease     Urinary retention 05/05/2022    PVR per ED 5/1/22 337 ml     Past Surgical History:   Procedure Laterality Date    COLONOSCOPY      ESOPHAGOGASTRODUODENOSCOPY      KNEE ARTHROSCOPY Left 2000    VASECTOMY  2005     N/A  Family history is reviewed and has not changed   Social History     Tobacco Use    Smoking status: Never     Passive exposure: Never    Smokeless tobacco: Never   Substance Use Topics    Alcohol use: Yes     Comment: 2-3 drinks per month    Drug use: Never       PTA Medications   Medication Sig    omeprazole (PRILOSEC) 20 MG capsule Take 20 mg by mouth once daily.    dextroamphetamine-amphetamine (ADDERALL) 20 mg tablet Take 1 tablet by mouth 2 (two) times a day.    ergocalciferol (ERGOCALCIFEROL) 50,000 unit Cap Take 1 capsule (50,000 Units total) by mouth every 7 days.    valACYclovir (VALTREX) 500 MG tablet Take 1 tablet (500 mg total) by mouth 2 (two) times daily.     Review of patient's allergies indicates:  No Known Allergies     ROS    Objective:      Vital Signs (Most Recent)  Temp: 98 °F (36.7 °C) (10/30/23 0651)  Pulse: 74 (10/30/23 0651)  Resp: 16 (10/30/23 0651)  BP: 120/80 (10/30/23 0651)  SpO2: 97 % (10/30/23 0651)    Vital Signs Range (Last 24H):  Temp:  [98 °F (36.7 °C)]   Pulse:  [74]   Resp:  [16]   BP: (120)/(80)   SpO2:  [97 %]     Physical Exam  Constitutional:       Appearance: Normal appearance.    Cardiovascular:      Rate and Rhythm: Normal rate.   Pulmonary:      Effort: Pulmonary effort is normal.   Abdominal:      General: Abdomen is flat.      Palpations: Abdomen is soft.      Tenderness: There is no abdominal tenderness.   Musculoskeletal:         General: Normal range of motion.   Skin:     General: Skin is warm.      Capillary Refill: Capillary refill takes less than 2 seconds.   Neurological:      General: No focal deficit present.      Mental Status: He is alert.   Psychiatric:         Mood and Affect: Mood normal.         Behavior: Behavior normal.         Thought Content: Thought content normal.         Judgment: Judgment normal.         Data Review:  CBC:   Lab Results   Component Value Date    WBC 4.87 10/30/2023    RBC 5.23 10/30/2023    HGB 16.1 10/30/2023    HCT 45.7 10/30/2023     10/30/2023     BMP:   Lab Results   Component Value Date     05/01/2022     05/01/2022    K 3.8 05/01/2022     05/01/2022    CO2 25 05/01/2022    BUN 8 05/01/2022    CREATININE 0.96 05/01/2022    CALCIUM 9.6 05/01/2022      ECG: no prior ECG.     Assessment:      There are no hospital problems to display for this patient.      Plan:    Lap vs open cholecystectomy

## 2023-10-30 NOTE — ANESTHESIA PREPROCEDURE EVALUATION
10/30/2023  Hemanth Cottrell is a 43 y.o., male.      Pre-op Assessment    I have reviewed the Patient Summary Reports.    I have reviewed the NPO Status.   I have reviewed the Medications.     Review of Systems         Anesthesia Plan  Type of Anesthesia, risks & benefits discussed:    Anesthesia Type: Gen ETT  Intra-op Monitoring Plan: Standard ASA Monitors  Post Op Pain Control Plan: IV/PO Opioids PRN  Induction:  IV  Informed Consent: Informed consent signed with the Patient and all parties understand the risks and agree with anesthesia plan.  All questions answered.   ASA Score: 3    Ready For Surgery From Anesthesia Perspective.     .  NPO greater than 8 hours  No anesthetic complications  NKDA    ISELA (CPAP)  GERD  Anxiety  Depression    Airway exam deferred (COVID precautions); adequate ROM at neck.

## 2023-10-30 NOTE — PROGRESS NOTES
856 RECEIVED TO RR AWAKE,ALERT.ORIENTATION GIVEN, COLOR PINK. RESP. UNLABORED. ABDOMEN SOFT WITH ISLAND DRESSING X4 D/I. IV INFUSING WELL LEFT AC 20G. CATH. SCD HOSE IN PROGRESS. NO DISTRESS NOTED SEE FLOW SHEET.    915 C/O PAIN AT OP-SITE. MORPHINE TITRATED FOR RELIEF. ENCOURAGED COUGH DEEP BREATHS..    950 AWAKE, ALERT. PAIN BETTER. ABDOMEN SOFT ,DRESSINGS D/I.TRANSFERRED TO ROOM.

## 2023-10-30 NOTE — ANESTHESIA PROCEDURE NOTES
Intubation    Date/Time: 10/30/2023 7:53 AM    Performed by: Olga Marinelli CRNA  Authorized by: Heath Rojas MD    Intubation:     Induction:  Intravenous    Intubated:  Postinduction    Mask Ventilation:  Easy mask    Attempts:  1    Attempted By:  CRNA    Method of Intubation:  Direct    Blade:  Razia 3 (mac 3.5)    Laryngeal View Grade: Grade I - full view of cords      Difficult Airway Encountered?: No      Complications:  None    Airway Device:  Oral endotracheal tube    Airway Device Size:  7.5    Style/Cuff Inflation:  Cuffed    Inflation Amount (mL):  5    Tube secured:  21    Secured at:  The lips    Placement Verified By:  Capnometry    Complicating Factors:  None    Findings Post-Intubation:  BS equal bilateral and atraumatic/condition of teeth unchanged

## 2023-10-30 NOTE — TRANSFER OF CARE
"Anesthesia Transfer of Care Note    Patient: Hemanth Cottrell    Procedure(s) Performed: Procedure(s) (LRB):  CHOLECYSTECTOMY, LAPAROSCOPIC (N/A)    Patient location: PACU    Anesthesia Type: MAC and general    Transport from OR: Transported from OR on room air with adequate spontaneous ventilation    Post pain: adequate analgesia    Post assessment: no apparent anesthetic complications    Post vital signs: stable    Level of consciousness: responds to stimulation and oriented    Nausea/Vomiting: no nausea/vomiting    Complications: none    Transfer of care protocol was followed      Last vitals:   Visit Vitals  /72 (Patient Position: Lying)   Pulse 76   Temp 36.5 °C (97.7 °F) (Oral)   Resp 12   Ht 5' 11" (1.803 m)   Wt 104.3 kg (230 lb)   SpO2 96%   BMI 32.08 kg/m²     "

## 2023-10-31 LAB
ESTROGEN SERPL-MCNC: NORMAL PG/ML
INSULIN SERPL-ACNC: NORMAL U[IU]/ML
LAB AP GROSS DESCRIPTION: NORMAL
LAB AP LABORATORY NOTES: NORMAL
T3RU NFR SERPL: NORMAL %

## 2023-11-09 ENCOUNTER — TELEPHONE (OUTPATIENT)
Dept: VASCULAR SURGERY | Facility: CLINIC | Age: 43
End: 2023-11-09

## 2023-11-09 NOTE — TELEPHONE ENCOUNTER
"----- Message from Sandra Harmon sent at 11/9/2023  8:15 AM CST -----  Regarding: WORK EXCUSE  PATIENT SAYS HIS WORK EXCUSE NEEDS TO BE REDONE BECAUSE IT NEEDS TO SAY "RELEASED TO FULL DUTY". CALL BACK NUMBER IS [phone].    "

## 2023-11-16 RX ORDER — CYCLOBENZAPRINE HCL 5 MG
5 TABLET ORAL 3 TIMES DAILY PRN
Qty: 30 TABLET | Refills: 0 | Status: SHIPPED | OUTPATIENT
Start: 2023-11-16 | End: 2023-11-26

## 2023-11-16 NOTE — DISCHARGE SUMMARY
[facility]  Discharge Note  Short Stay    Procedure(s) (LRB):  CHOLECYSTECTOMY, LAPAROSCOPIC (N/A)      OUTCOME: Patient tolerated treatment/procedure well without complication and is now ready for discharge.    DISPOSITION: Home or Self Care    FINAL DIAGNOSIS:  Biliary colic    FOLLOWUP: In clinic    DISCHARGE INSTRUCTIONS:    Discharge Procedure Orders   Diet general     Other restrictions (specify):   Order Comments: No driving 6 days,     Remove dressing in 48 hours         Clinical Reference Documents Added to Patient Instructions         Document    CHOLECYSTECTOMY DISCHARGE INSTRUCTIONS (ENGLISH)    RETURN TO SCHOOL OR WORK ED (ENGLISH)            TIME SPENT ON DISCHARGE: 15 minutes

## 2023-11-21 ENCOUNTER — OFFICE VISIT (OUTPATIENT)
Dept: FAMILY MEDICINE | Facility: CLINIC | Age: 43
End: 2023-11-21
Payer: COMMERCIAL

## 2023-11-21 VITALS
RESPIRATION RATE: 20 BRPM | TEMPERATURE: 98 F | OXYGEN SATURATION: 95 % | HEIGHT: 71 IN | HEART RATE: 94 BPM | DIASTOLIC BLOOD PRESSURE: 76 MMHG | BODY MASS INDEX: 33.04 KG/M2 | SYSTOLIC BLOOD PRESSURE: 130 MMHG | WEIGHT: 236 LBS

## 2023-11-21 DIAGNOSIS — E55.9 VITAMIN D DEFICIENCY, UNSPECIFIED: ICD-10-CM

## 2023-11-21 DIAGNOSIS — B00.9 HERPES: ICD-10-CM

## 2023-11-21 DIAGNOSIS — F90.9 ATTENTION DEFICIT HYPERACTIVITY DISORDER (ADHD), UNSPECIFIED ADHD TYPE: ICD-10-CM

## 2023-11-21 DIAGNOSIS — R03.0 ELEVATED BLOOD PRESSURE READING IN OFFICE WITHOUT DIAGNOSIS OF HYPERTENSION: Primary | ICD-10-CM

## 2023-11-21 LAB

## 2023-11-21 PROCEDURE — 3078F PR MOST RECENT DIASTOLIC BLOOD PRESSURE < 80 MM HG: ICD-10-PCS | Mod: ,,, | Performed by: NURSE PRACTITIONER

## 2023-11-21 PROCEDURE — 3078F DIAST BP <80 MM HG: CPT | Mod: ,,, | Performed by: NURSE PRACTITIONER

## 2023-11-21 PROCEDURE — 80305 POCT URINE DRUG SCREEN PRESUMP: ICD-10-PCS | Mod: QW,,, | Performed by: NURSE PRACTITIONER

## 2023-11-21 PROCEDURE — 3044F PR MOST RECENT HEMOGLOBIN A1C LEVEL <7.0%: ICD-10-PCS | Mod: ,,, | Performed by: NURSE PRACTITIONER

## 2023-11-21 PROCEDURE — 99214 PR OFFICE/OUTPT VISIT, EST, LEVL IV, 30-39 MIN: ICD-10-PCS | Mod: ,,, | Performed by: NURSE PRACTITIONER

## 2023-11-21 PROCEDURE — 1159F MED LIST DOCD IN RCRD: CPT | Mod: ,,, | Performed by: NURSE PRACTITIONER

## 2023-11-21 PROCEDURE — 1159F PR MEDICATION LIST DOCUMENTED IN MEDICAL RECORD: ICD-10-PCS | Mod: ,,, | Performed by: NURSE PRACTITIONER

## 2023-11-21 PROCEDURE — 3008F PR BODY MASS INDEX (BMI) DOCUMENTED: ICD-10-PCS | Mod: ,,, | Performed by: NURSE PRACTITIONER

## 2023-11-21 PROCEDURE — 3075F SYST BP GE 130 - 139MM HG: CPT | Mod: ,,, | Performed by: NURSE PRACTITIONER

## 2023-11-21 PROCEDURE — 1160F PR REVIEW ALL MEDS BY PRESCRIBER/CLIN PHARMACIST DOCUMENTED: ICD-10-PCS | Mod: ,,, | Performed by: NURSE PRACTITIONER

## 2023-11-21 PROCEDURE — 80305 DRUG TEST PRSMV DIR OPT OBS: CPT | Mod: QW,,, | Performed by: NURSE PRACTITIONER

## 2023-11-21 PROCEDURE — 3044F HG A1C LEVEL LT 7.0%: CPT | Mod: ,,, | Performed by: NURSE PRACTITIONER

## 2023-11-21 PROCEDURE — 1160F RVW MEDS BY RX/DR IN RCRD: CPT | Mod: ,,, | Performed by: NURSE PRACTITIONER

## 2023-11-21 PROCEDURE — 99214 OFFICE O/P EST MOD 30 MIN: CPT | Mod: ,,, | Performed by: NURSE PRACTITIONER

## 2023-11-21 PROCEDURE — 3008F BODY MASS INDEX DOCD: CPT | Mod: ,,, | Performed by: NURSE PRACTITIONER

## 2023-11-21 PROCEDURE — 3075F PR MOST RECENT SYSTOLIC BLOOD PRESS GE 130-139MM HG: ICD-10-PCS | Mod: ,,, | Performed by: NURSE PRACTITIONER

## 2023-11-21 RX ORDER — DEXTROAMPHETAMINE SACCHARATE, AMPHETAMINE ASPARTATE, DEXTROAMPHETAMINE SULFATE AND AMPHETAMINE SULFATE 5; 5; 5; 5 MG/1; MG/1; MG/1; MG/1
1 TABLET ORAL 2 TIMES DAILY
Qty: 60 TABLET | Refills: 0 | Status: SHIPPED | OUTPATIENT
Start: 2023-11-21 | End: 2024-03-01

## 2023-11-21 RX ORDER — VALACYCLOVIR HYDROCHLORIDE 500 MG/1
500 TABLET, FILM COATED ORAL 2 TIMES DAILY
Qty: 180 TABLET | Refills: 1 | Status: SHIPPED | OUTPATIENT
Start: 2023-11-21 | End: 2024-03-01 | Stop reason: SDUPTHER

## 2023-11-21 RX ORDER — DEXTROAMPHETAMINE SACCHARATE, AMPHETAMINE ASPARTATE, DEXTROAMPHETAMINE SULFATE AND AMPHETAMINE SULFATE 5; 5; 5; 5 MG/1; MG/1; MG/1; MG/1
1 TABLET ORAL 2 TIMES DAILY
Qty: 60 TABLET | Refills: 0 | Status: SHIPPED | OUTPATIENT
Start: 2023-11-21

## 2023-11-21 RX ORDER — ERGOCALCIFEROL 1.25 MG/1
50000 CAPSULE ORAL
Qty: 12 CAPSULE | Refills: 1 | Status: SHIPPED | OUTPATIENT
Start: 2023-11-21 | End: 2024-03-01 | Stop reason: SDUPTHER

## 2023-11-22 ENCOUNTER — OFFICE VISIT (OUTPATIENT)
Dept: VASCULAR SURGERY | Facility: CLINIC | Age: 43
End: 2023-11-22
Payer: COMMERCIAL

## 2023-11-22 ENCOUNTER — PATIENT OUTREACH (OUTPATIENT)
Dept: ADMINISTRATIVE | Facility: HOSPITAL | Age: 43
End: 2023-11-22

## 2023-11-22 VITALS — WEIGHT: 236 LBS | HEIGHT: 71 IN | BODY MASS INDEX: 33.04 KG/M2

## 2023-11-22 DIAGNOSIS — K81.1 CHRONIC CHOLECYSTITIS: Primary | ICD-10-CM

## 2023-11-22 DIAGNOSIS — Z09 SURGERY FOLLOW-UP: ICD-10-CM

## 2023-11-22 PROCEDURE — 99024 POSTOP FOLLOW-UP VISIT: CPT | Mod: ,,, | Performed by: SURGERY

## 2023-11-22 PROCEDURE — 3044F PR MOST RECENT HEMOGLOBIN A1C LEVEL <7.0%: ICD-10-PCS | Mod: ,,, | Performed by: SURGERY

## 2023-11-22 PROCEDURE — 1160F RVW MEDS BY RX/DR IN RCRD: CPT | Mod: ,,, | Performed by: SURGERY

## 2023-11-22 PROCEDURE — 99024 PR POST-OP FOLLOW-UP VISIT: ICD-10-PCS | Mod: ,,, | Performed by: SURGERY

## 2023-11-22 PROCEDURE — 1159F PR MEDICATION LIST DOCUMENTED IN MEDICAL RECORD: ICD-10-PCS | Mod: ,,, | Performed by: SURGERY

## 2023-11-22 PROCEDURE — 1159F MED LIST DOCD IN RCRD: CPT | Mod: ,,, | Performed by: SURGERY

## 2023-11-22 PROCEDURE — 3044F HG A1C LEVEL LT 7.0%: CPT | Mod: ,,, | Performed by: SURGERY

## 2023-11-22 PROCEDURE — 1160F PR REVIEW ALL MEDS BY PRESCRIBER/CLIN PHARMACIST DOCUMENTED: ICD-10-PCS | Mod: ,,, | Performed by: SURGERY

## 2023-11-22 PROCEDURE — 99213 OFFICE O/P EST LOW 20 MIN: CPT | Mod: PBBFAC | Performed by: SURGERY

## 2023-11-22 NOTE — PROGRESS NOTES
"Subjective:       Patient ID: Hemanth Cottrell is a 43 y.o. male.    Chief Complaint: Follow-up (F/U TEST RESULTS )  Status patient follow-up.  Patient has laparoscopic cholecystectomy pathology chronic cholecystitis cholesterolosis doing well symptoms relief  family history includes Diabetes in his father, maternal grandmother, and mother; Hypertension in his maternal grandmother; Lung cancer in his paternal grandfather; No Known Problems in his brother, daughter, daughter, daughter, maternal grandfather, paternal grandmother, sister, and son.  Past Medical History:   Diagnosis Date    Achilles tendinitis     Allergic rhinitis     Anxiety disorder     COVID-19 08/2021    x 2 (02/2021)    Depressive disorder     GERD (gastroesophageal reflux disease)     Recurrent genital herpes simplex type 2 infection     Taking multiple medications for chronic disease     Urinary retention 05/05/2022    PVR per ED 5/1/22 337 ml      Past Surgical History:   Procedure Laterality Date    CHOLECYSTECTOMY  10/30/2023    COLONOSCOPY      ESOPHAGOGASTRODUODENOSCOPY      KNEE ARTHROSCOPY Left 2000    LAPAROSCOPIC CHOLECYSTECTOMY N/A 10/30/2023    Procedure: CHOLECYSTECTOMY, LAPAROSCOPIC;  Surgeon: Georgina Espinoza MD;  Location: [facility];  Service: General;  Laterality: N/A;    VASECTOMY  2005       reports that he has never smoked. He has never been exposed to tobacco smoke. He has never used smokeless tobacco. He reports current alcohol use. He reports that he does not use drugs.   HPI  Review of Systems      Objective:      Ht 5' 11" (1.803 m)   Wt 107 kg (236 lb)   BMI 32.92 kg/m²    Physical Exam  Constitutional:       Appearance: Normal appearance.   Cardiovascular:      Rate and Rhythm: Normal rate.   Abdominal:      Comments: Surgical scars well healed   Skin:     General: Skin is warm.      Capillary Refill: Capillary refill takes less than 2 seconds.   Neurological:      Mental Status: He is alert.   Psychiatric:    "      Mood and Affect: Mood normal.         Behavior: Behavior normal.         Thought Content: Thought content normal.         Judgment: Judgment normal.           Assessment:       1. Chronic cholecystitis    2. Surgery follow-up        Plan:       Pathology report reviewed with the patient     Doing well postoperative educated patient call for problems

## 2023-11-22 NOTE — PROGRESS NOTES
Subjective     Patient ID: Hemanth Cottrell is a 43 y.o. male.    Chief Complaint: Color Me Healthy (Patient is here for color me healthy. )    43 year old male presents to clinic for [facility] visit. He states he has been doing well and denies problems. He recently had cholecystectomy done on 10/30 with Dr Espinoza and is recovering well. He has follow up appt with Dr Espinoza tomorrow.       Review of Systems   Constitutional:  Negative for activity change, appetite change, fatigue and fever.   HENT:  Negative for nasal congestion, ear pain, rhinorrhea, sinus pressure/congestion and sore throat.    Eyes:  Negative for pain, redness, visual disturbance and eye dryness.   Respiratory:  Negative for cough and shortness of breath.    Cardiovascular:  Negative for chest pain and leg swelling.   Gastrointestinal:  Negative for abdominal distention, abdominal pain, constipation and diarrhea.   Endocrine: Negative for cold intolerance, heat intolerance and polyuria.   Genitourinary:  Negative for bladder incontinence, dysuria, frequency and urgency.   Musculoskeletal:  Negative for arthralgias, gait problem and myalgias.   Integumentary:  Negative for color change, rash and wound.   Allergic/Immunologic: Negative for environmental allergies and food allergies.   Neurological:  Negative for dizziness, weakness, light-headedness and headaches.   Psychiatric/Behavioral:  Negative for behavioral problems and sleep disturbance.        Tobacco Use: Low Risk  (11/21/2023)    Patient History     Smoking Tobacco Use: Never     Smokeless Tobacco Use: Never     Passive Exposure: Never     Review of patient's allergies indicates:  No Known Allergies  Current Outpatient Medications   Medication Instructions    Bifidobacterium infantis (ALIGN ORAL) Oral, 2 chews daily    cyclobenzaprine (FLEXERIL) 5 mg, Oral, 3 times daily PRN    dextroamphetamine-amphetamine (ADDERALL) 20 mg tablet 1 tablet, Oral, 2 times daily    dextroamphetamine-amphetamine  "(ADDERALL) 20 mg tablet 1 tablet, Oral, 2 times daily    dextroamphetamine-amphetamine (ADDERALL) 20 mg tablet 1 tablet, Oral, 2 times daily    ergocalciferol (ERGOCALCIFEROL) 50,000 Units, Oral, Every 7 days    omeprazole (PRILOSEC) 20 mg, Oral, Daily    valACYclovir (VALTREX) 500 mg, Oral, 2 times daily     Medications Discontinued During This Encounter   Medication Reason    valACYclovir (VALTREX) 500 MG tablet Reorder    dextroamphetamine-amphetamine (ADDERALL) 20 mg tablet Reorder    ergocalciferol (ERGOCALCIFEROL) 50,000 unit Cap Reorder    HYDROcodone-acetaminophen (NORCO) 5-325 mg per tablet Patient no longer taking       Past Medical History:   Diagnosis Date    Achilles tendinitis     Allergic rhinitis     Anxiety disorder     COVID-19 08/2021    x 2 (02/2021)    Depressive disorder     GERD (gastroesophageal reflux disease)     Recurrent genital herpes simplex type 2 infection     Taking multiple medications for chronic disease     Urinary retention 05/05/2022    PVR per ED 5/1/22 337 ml     Health Maintenance Topics with due status: Not Due       Topic Last Completion Date    TETANUS VACCINE 01/21/2022    Hemoglobin A1c (Diabetic Prevention Screening) 08/23/2023    Lipid Panel 08/23/2023     Immunization History   Administered Date(s) Administered    COVID-19, MRNA, LN-S, PF (MODERNA FULL 0.5 ML DOSE) 12/01/2020, 12/22/2020    Influenza 11/01/2018    Influenza - Quadrivalent - PF *Preferred* (6 months and older) 11/11/2022    PPD Test 01/14/2022, 12/27/2022    Tdap 01/21/2022       Objective     Body mass index is 32.92 kg/m².  Wt Readings from Last 3 Encounters:   11/21/23 107 kg (236 lb)   10/30/23 104.3 kg (230 lb)   08/22/23 108.1 kg (238 lb 6.4 oz)     Ht Readings from Last 3 Encounters:   11/21/23 5' 11" (1.803 m)   10/30/23 5' 11" (1.803 m)   08/22/23 5' 11" (1.803 m)     BP Readings from Last 3 Encounters:   11/21/23 130/76   10/30/23 127/84   08/22/23 (!) 142/74     Temp Readings from Last 3 " Encounters:   11/21/23 97.8 °F (36.6 °C) (Oral)   10/30/23 98 °F (36.7 °C) (Oral)   08/22/23 98 °F (36.7 °C) (Oral)     Pulse Readings from Last 3 Encounters:   11/21/23 94   10/30/23 84   08/22/23 89     Resp Readings from Last 3 Encounters:   11/21/23 20   10/30/23 16   08/22/23 18     PF Readings from Last 3 Encounters:   No data found for PF       Physical Exam  Vitals and nursing note reviewed.   HENT:      Head: Normocephalic.      Right Ear: Tympanic membrane normal.      Left Ear: Tympanic membrane normal.      Nose: Nose normal.      Mouth/Throat:      Mouth: Mucous membranes are moist.      Pharynx: Oropharynx is clear. No posterior oropharyngeal erythema.   Eyes:      Conjunctiva/sclera: Conjunctivae normal.   Cardiovascular:      Rate and Rhythm: Normal rate and regular rhythm.      Pulses: Normal pulses.      Heart sounds: Normal heart sounds.   Pulmonary:      Effort: Pulmonary effort is normal.      Breath sounds: Normal breath sounds.   Abdominal:      General: Abdomen is flat. Bowel sounds are normal. There is no distension.      Palpations: Abdomen is soft.   Musculoskeletal:         General: No swelling or tenderness. Normal range of motion.      Cervical back: Normal range of motion.      Right lower leg: No edema.      Left lower leg: No edema.   Skin:     General: Skin is warm and dry.      Capillary Refill: Capillary refill takes less than 2 seconds.   Neurological:      Mental Status: He is alert. Mental status is at baseline.   Psychiatric:         Mood and Affect: Mood normal.         Behavior: Behavior normal.         Assessment and Plan     Problem List Items Addressed This Visit          Psychiatric    Attention deficit hyperactivity disorder (ADHD)     Well controlled on Adderall 20mg BID. ADHD testing within last 7 years,  checked and is ok. NO signs of abuse, no aberrant behavior noted. Instructed patient to take meds as ordered. Follow up in 3 months or as needed.         Relevant  Medications    dextroamphetamine-amphetamine (ADDERALL) 20 mg tablet    Other Relevant Orders    POCT Urine Drug Screen Presump (Completed)       Cardiac/Vascular    Elevated blood pressure reading in office without diagnosis of hypertension - Primary       ID    Herpes    Relevant Medications    valACYclovir (VALTREX) 500 MG tablet       Endocrine    Vitamin D deficiency, unspecified    Relevant Medications    ergocalciferol (ERGOCALCIFEROL) 50,000 unit Cap       Plan:   Instructed patient to keep appts as scheduled.   Instructed on DASH diet and increased exercise. Recommended at least 150 minutes a week with resistance training as tolerated. Monitor weight and try to lose some.   Instructed on importance of taking all medications as prescribed.   Discussed yearly dental and eye exams.    Discussed use of sun screen, helmets and seat belts.  Gun safety discussed  Sleep discussed  Stay away from tobacco products    Discussed and provided with a screening schedule and personal prevention plan in accordance with USPSTF age appropriate recommendations and screening guidelines.   Education given and reviewed with patient. Counseling and referrals were provided as needed.  After Visit Summary printed and given to patient which includes written education and a list of any referrals if indicated.      F/u plan for yearly AWV. You        I have reviewed the medications, allergies, and problem list.     Goal Actions:    What type of visit is the patient here for today?: Color Me Healthy  Which Color Me Healthy program is the patient enrolling in?: Blood Pressure (Hypertension)  Is this a Wellness Follow Up?: Yes  What is your overall wellness goal? (select at least one): Improve overall health  Choose 3: Biometric, Exercise, Nutrition  Biometric Actions: BMI>30 lose 1-2 lbs per week, SBP<120 and DBP<80  Nurtrition Actions: Recommend weight loss, Eat heart healthy diet  Exercise Actions: Recommend physical activity 30  minutes per day 3-5 times/week

## 2023-11-22 NOTE — PROGRESS NOTES
Population Health Chart Review & Patient Outreach Details      Further Action Needed If Patient Returns Outreach:            Updates Requested / Reviewed:     []  Care Everywhere    []     []  External Sources ([facility], [facility], [facility], etc.)    [] [facility]   [] [facility]   [] Other:    []  Care Team Updated   []  Removed  or Duplicate Orders   []  Immunization Reconciliation Completed / Queried    [] [place]   [] [place]   [] [place]   [] [place]      Health Maintenance Topics Addressed and Outreach Outcomes / Actions Taken:             Breast Cancer Screening []  Mammogram Order Placed    []  Mammogram Screening Scheduled    []  External Records Requested & Care Team Updated if Applicable    []  External Records Uploaded & Care Team Updated if Applicable    []  Pt Declined Scheduling Mammogram    []  Pt Will Schedule with External Provider / Order Routed & Care Team Updated if Applicable              Cervical Cancer Screening []  Pap Smear Scheduled in Primary Care or OBGYN    []  External Records Requested & Care Team Updated if Applicable       []  External Records Uploaded, Care Team Updated, & History Updated if Applicable    []  Patient Declined Scheduling Pap Smear    []  Patient Will Schedule with External Provider & Care Team Updated if Applicable                  Colorectal Cancer Screening []  Colonoscopy Case Request / Referral / Home Test Order Placed    []  External Records Requested & Care Team Updated if Applicable    []  External Records Uploaded, Care Team Updated, & History Updated if Applicable    []  Patient Declined Completing Colon Cancer Screening    []  Patient Will Schedule with External Provider & Care Team Updated if Applicable    []  Fit Kit Mailed (add the SmartPhrase under additional notes)    []  Reminded Patient to Complete Home Test                Diabetic Eye Exam []  Eye Exam Screening Order Placed    []  Eye Camera Scheduled or Optometry/Ophthalmology Referral  Placed    []  External Records Requested & Care Team Updated if Applicable    []  External Records Uploaded, Care Team Updated, & History Updated if Applicable    []  Patient Declined Scheduling Eye Exam    []  Patient Will Schedule with External Provider & Care Team Updated if Applicable             Blood Pressure Control []  Primary Care Follow Up Visit Scheduled     []  Remote Blood Pressure Reading Captured    []  Patient Declined Remote Reading or Scheduling Appt - Escalated to PCP    []  Patient Will Call Back or Send Portal Message with Reading                 HbA1c & Other Labs []  Overdue Lab(s) Ordered    []  Overdue Lab(s) Scheduled    []  External Records Uploaded & Care Team Updated if Applicable    []  Primary Care Follow Up Visit Scheduled     []  Reminded Patient to Complete A1c Home Test    []  Patient Declined Scheduling Labs or Will Call Back to Schedule    []  Patient Will Schedule with External Provider / Order Routed, & Care Team Updated if Applicable           Primary Care Appointment []  Primary Care Appt Scheduled    []  Patient Declined Scheduling or Will Call Back to Schedule    []  Pt Established with External Provider, Updated Care Team, & Informed Pt to Notify Payor if Applicable           Medication Adherence /    Statin Use []  Primary Care Appointment Scheduled    []  Patient Reminded to  Prescription    []  Patient Declined, Provider Notified if Needed    []  Sent Provider Message to Review to Evaluate Pt for Statin, Add Exclusion Dx Codes, Document   Exclusion in Problem List, Change Statin Intensity Level to Moderate or High Intensity if Applicable                Osteoporosis Screening []  Dexa Order Placed    []  Dexa Appointment Scheduled    []  External Records Requested & Care Team Updated    []  External Records Uploaded, Care Team Updated, & History Updated if Applicable    []  Patient Declined Scheduling Dexa or Will Call Back to Schedule    []  Patient Will Schedule  with External Provider / Order Routed & Care Team Updated if Applicable       Additional Notes:.  Post visit Population Health review of encounter with date of service  11/21/23 with Dar.  All required CM[facility] components in encounter.    Followup appt for: 02/20/2024 [facility] #2

## 2023-11-22 NOTE — PATIENT INSTRUCTIONS
"Patient Education       High Blood Pressure in Adults   The Basics   Written by the doctors and editors at [facility]   What is high blood pressure? -- High blood pressure is a condition that puts you at risk for heart attack, stroke, and kidney disease. It does not usually cause symptoms. But it can be serious.  When your doctor or nurse tells you your blood pressure, they will say 2 numbers. For instance, your doctor or nurse might say that your blood pressure is "130 over 80." The top number is the pressure inside your arteries when your heart is nghia. The bottom number is the pressure inside your arteries when your heart is relaxed.  "Elevated blood pressure" is a term doctors or nurses use as a warning. People with elevated blood pressure do not yet have high blood pressure. But their blood pressure is not as low as it should be for good health.  Many experts define high, elevated, and normal blood pressure as follows:  High - Top number of 130 or above and/or bottom number of 80 or above  Elevated - Top number between 120 and 129 and bottom number of 79 or below  Normal - Top number of 119 or below and bottom number of 79 or below  This information is also in the table (table 1).   How can I lower my blood pressure? -- If your doctor or nurse has prescribed blood pressure medicine, the most important thing you can do is to take it. If it causes side effects, do not just stop taking it. Instead, talk to your doctor or nurse about the problems it causes. They might be able to lower your dose or switch you to another medicine. If cost is a problem, mention that too. They might be able to put you on a less expensive medicine. Taking your blood pressure medicine can keep you from having a heart attack or stroke, and it can save your life!  Can I do anything on my own? -- You have a lot of control over your blood pressure. To lower it:  Lose weight (if you are overweight)  Choose a diet low in fat and rich in " "fruits, vegetables, and low-fat dairy products  Reduce the amount of salt you eat  Do something active for at least 30 minutes a day on most days of the week  Cut down on alcohol (if you drink more than 2 alcoholic drinks per day)  It's also a good idea to get a home blood pressure meter. People who check their own blood pressure at home do better at keeping it low and can sometimes even reduce the amount of medicine they take.  All topics are updated as new evidence becomes available and our peer review process is complete.  This topic retrieved from [employer] on: Sep 21, 2021.  Topic 63394 Version 15.0  Release: 29.4.2 - C29.263  © 2021 UpToDate, Inc. and/or its affiliates. All rights reserved.  table 1: Definition of normal and high blood pressure  Level  Top number  Bottom number    High 130 or above 80 or above   Elevated 120 to 129 79 or below   Normal 119 or below 79 or below   These definitions are from the American College of Cardiology/American Heart Association. Other expert groups might use slightly different definitions.  "Elevated blood pressure" is a term doctor or nurses use as a warning. It means you do not yet have high blood pressure, but your blood pressure is not as low as it should be for good health.  Graphic 71658 Version 6.0  Consumer Information Use and Disclaimer   This information is not specific medical advice and does not replace information you receive from your health care provider. This is only a brief summary of general information. It does NOT include all information about conditions, illnesses, injuries, tests, procedures, treatments, therapies, discharge instructions or life-style choices that may apply to you. You must talk with your health care provider for complete information about your health and treatment options. This information should not be used to decide whether or not to accept your health care provider's advice, instructions or recommendations. Only your health care " provider has the knowledge and training to provide advice that is right for you. The use of this information is governed by the [employer] End User License Agreement, available at https://www.mobiDEOS.LabRoots/en/solutions/[employer]/about/brett.The use of [employer] content is governed by the [employer] Terms of Use. ©2021 UpToDate, Inc. All rights reserved.  Copyright   © 2021 UpToDate, Inc. and/or its affiliates. All rights reserved.    Patient Education       Diet and Health   The Basics   Written by the doctors and editors at [facility]   Why is it important to eat a healthy diet? -- It's important to eat a healthy diet because eating the right foods can keep you healthy now and later on in life.  Which foods are especially healthy? -- Foods that are especially healthy include:  Fruits and vegetables - Eating a diet with lots of fruits and vegetables can help prevent heart disease and strokes. It might also help prevent certain types of cancers. Try to eat fruits and vegetables at each meal and also for snacks. If you don't have fresh fruits and vegetables available, you can eat frozen or canned ones instead. Doctors recommend eating at least 2 1/2 servings of vegetables and 2 servings of fruits each day.  Foods with fiber - Eating foods with a lot of fiber can help prevent heart disease and strokes. If you have type 2 diabetes, it can also help control your blood sugar. Foods that have a lot of fiber include vegetables, fruits, beans, nuts, oatmeal, and whole grain breads and cereals. You can tell how much fiber is in a food by reading the nutrition label (figure 1). Doctors recommend eating 25 to 36 grams of fiber each day.  Foods with folate (also called folic acid) - Folate is a vitamin that is important for pregnant people, since it helps prevent certain birth defects. Anyone who could get pregnant should get at least 400 micrograms of folic acid daily, whether or not they are actively trying to get pregnant. Folate  "is found in many breakfast cereals, oranges, orange juice, and green leafy vegetables.  Foods with calcium and vitamin D - Babies, children, and adults need calcium and vitamin D to help keep their bones strong. Adults also need calcium and vitamin D to help prevent osteoporosis. Osteoporosis is a condition that causes bones to get "thin" and break more easily than usual. Different foods and drinks have calcium and vitamin D in them (figure 2). People who don't get enough calcium and vitamin D in their diet might need to take a supplement.  Foods with protein - Protein helps your muscles stay strong. Healthy foods with a lot of protein include chicken, fish, eggs, beans, nuts, and soy products. Red meat also has a lot of protein, but it also contains fats, which can be unhealthy.  Some experts recommend a "Mediterranean diet." This involves eating a lot of fruits, vegetables, nuts, whole grains, and olive oil. It also includes some fish, poultry, and dairy products, but not a lot of red meat. Eating this way can help your overall health, and might even lower your risk of having a stroke.  What foods should I avoid or limit? -- To eat a healthy diet, there are some things you should avoid or limit. They include:   Fats - There are different types of fats. Some types of fats are better for your body than others.  Trans fats are especially unhealthy. They are found in margarines, many fast foods, and some store-bought baked goods. Trans fats can raise your cholesterol level and your increase your chance of getting heart disease. You should avoid eating foods with these types of fats.  The type of "polyunsaturated" fats found in fish seems to be healthy and can reduce your chance of getting heart disease. Other polyunsaturated fats might also be good for your health. When you cook, it's best to use oils with healthier fats, such as olive oil and canola oil.  Sugar - To have a healthy diet, it's important to limit or " "avoid sugar, sweets, and refined grains. Refined grains are found in white bread, white rice, most forms of pasta, and most packaged "snack" foods. Whole grains, such as whole-wheat bread and brown rice, have more fiber and are better for your health.  Avoiding sugar-sweetened beverages, like soda and sports drinks, can also help improve your health.  Red meat - Studies have shown that eating a lot of red meat can increase your risk of certain health problems, including heart disease and cancer. You should limit the amount of red meat that you eat.  Can I drink alcohol as part of a healthy diet? -- People who drink a small amount of alcohol each day might have a lower chance of getting heart disease. But drinking alcohol can lead to problems. For example, it can raise a person's chances of getting liver disease and certain types of cancers. In women, even 1 drink a day can increase the risk of getting breast cancer.  Most doctors recommend that adult women not have more than 1 drink a day and that adult men not have more than 2 drinks a day. The limits are different because most women's bodies take longer to break down alcohol.  How many calories do I need each day? -- The number of calories you need each day depends on your weight, height, age, sex, and how active you are.  Your doctor or nurse can tell you how many calories you should eat each day. If you are trying to lose weight, you should eat fewer calories each day.  What if I have questions? -- If you have questions about which foods you should or should not eat, ask your doctor or nurse. The right diet for you will depend, in part, on your health and any medical conditions you have.  All topics are updated as new evidence becomes available and our peer review process is complete.  This topic retrieved from [employer] on: Sep 21, 2021.  Topic 65101 Version 20.0  Release: 29.4.2 - C29.263  © 2021 UpToDate, Inc. and/or its affiliates. All rights " "reserved.  figure 1: Nutrition label - fiber     This is an example of a nutrition label. To figure out how much fiber is in a food, look for the line that says "Dietary Fiber." It's also important to look at the serving size. This food has 7 grams of fiber in each serving, and each serving is 1 cup.  Graphic 30810 Version 7.0    figure 2: Foods and drinks with calcium and vitamin D     Foods rich in calcium include ice cream, soy milk, breads, kale, broccoli, milk, cheese, cottage cheese, almonds, yogurt, ready-to-eat cereals, beans, and tofu. Foods rich in vitamin D include milk, fortified plant-based "milks" (soy, almond), canned tuna fish, cod liver oil, yogurt, ready-to-eat-cereals, cooked salmon, canned sardines, mackerel, and eggs. Some of these foods are rich in both.  Graphic 63472 Version 4.0    Consumer Information Use and Disclaimer   This information is not specific medical advice and does not replace information you receive from your health care provider. This is only a brief summary of general information. It does NOT include all information about conditions, illnesses, injuries, tests, procedures, treatments, therapies, discharge instructions or life-style choices that may apply to you. You must talk with your health care provider for complete information about your health and treatment options. This information should not be used to decide whether or not to accept your health care provider's advice, instructions or recommendations. Only your health care provider has the knowledge and training to provide advice that is right for you. The use of this information is governed by the [employer] End User License Agreement, available at https://www.[employer].AG&P/en/solutions/[employer]/about/brett.The use of [employer] content is governed by the [employer] Terms of Use. ©2021 UpToDate, Inc. All rights reserved.  Copyright   © 2021 UpToDate, Inc. and/or its affiliates. All rights reserved.    "

## 2024-02-21 ENCOUNTER — PATIENT OUTREACH (OUTPATIENT)
Dept: ADMINISTRATIVE | Facility: HOSPITAL | Age: 44
End: 2024-02-21

## 2024-02-21 NOTE — PROGRESS NOTES
Population Health Chart Review & Patient Outreach Details    Reschedule [facility]  Sent message via secure chat to Bette to reschedule  Further Action Needed If Patient Returns Outreach:            Updates Requested / Reviewed:     []  Care Everywhere    []     []  External Sources ([facility], [facility], [facility], etc.)    [] [facility]   [] [facility]   [] Other:    []  Care Team Updated   []  Removed  or Duplicate Orders   []  Immunization Reconciliation Completed / Queried    [] [place]   [] [place]   [] [place]   [] [place]      Health Maintenance Topics Addressed and Outreach Outcomes / Actions Taken:             Breast Cancer Screening []  Mammogram Order Placed    []  Mammogram Screening Scheduled    []  External Records Requested & Care Team Updated if Applicable    []  External Records Uploaded & Care Team Updated if Applicable    []  Pt Declined Scheduling Mammogram    []  Pt Will Schedule with External Provider / Order Routed & Care Team Updated if Applicable              Cervical Cancer Screening []  Pap Smear Scheduled in Primary Care or OBGYN    []  External Records Requested & Care Team Updated if Applicable       []  External Records Uploaded, Care Team Updated, & History Updated if Applicable    []  Patient Declined Scheduling Pap Smear    []  Patient Will Schedule with External Provider & Care Team Updated if Applicable                  Colorectal Cancer Screening []  Colonoscopy Case Request / Referral / Home Test Order Placed    []  External Records Requested & Care Team Updated if Applicable    []  External Records Uploaded, Care Team Updated, & History Updated if Applicable    []  Patient Declined Completing Colon Cancer Screening    []  Patient Will Schedule with External Provider & Care Team Updated if Applicable    []  Fit Kit Mailed (add the SmartPhrase under additional notes)    []  Reminded Patient to Complete Home Test                Diabetic Eye Exam []  Eye Exam Screening Order Placed     []  Eye Camera Scheduled or Optometry/Ophthalmology Referral Placed    []  External Records Requested & Care Team Updated if Applicable    []  External Records Uploaded, Care Team Updated, & History Updated if Applicable    []  Patient Declined Scheduling Eye Exam    []  Patient Will Schedule with External Provider & Care Team Updated if Applicable             Blood Pressure Control []  Primary Care Follow Up Visit Scheduled     []  Remote Blood Pressure Reading Captured    []  Patient Declined Remote Reading or Scheduling Appt - Escalated to PCP    []  Patient Will Call Back or Send Portal Message with Reading                 HbA1c & Other Labs []  Overdue Lab(s) Ordered    []  Overdue Lab(s) Scheduled    []  External Records Uploaded & Care Team Updated if Applicable    []  Primary Care Follow Up Visit Scheduled     []  Reminded Patient to Complete A1c Home Test    []  Patient Declined Scheduling Labs or Will Call Back to Schedule    []  Patient Will Schedule with External Provider / Order Routed, & Care Team Updated if Applicable           Primary Care Appointment []  Primary Care Appt Scheduled    []  Patient Declined Scheduling or Will Call Back to Schedule    []  Pt Established with External Provider, Updated Care Team, & Informed Pt to Notify Payor if Applicable           Medication Adherence /    Statin Use []  Primary Care Appointment Scheduled    []  Patient Reminded to  Prescription    []  Patient Declined, Provider Notified if Needed    []  Sent Provider Message to Review to Evaluate Pt for Statin, Add Exclusion Dx Codes, Document   Exclusion in Problem List, Change Statin Intensity Level to Moderate or High Intensity if Applicable                Osteoporosis Screening []  Dexa Order Placed    []  Dexa Appointment Scheduled    []  External Records Requested & Care Team Updated    []  External Records Uploaded, Care Team Updated, & History Updated if Applicable    []  Patient Declined Scheduling  Dexa or Will Call Back to Schedule    []  Patient Will Schedule with External Provider / Order Routed & Care Team Updated if Applicable       Additional Notes:

## 2024-03-01 ENCOUNTER — OFFICE VISIT (OUTPATIENT)
Dept: FAMILY MEDICINE | Facility: CLINIC | Age: 44
End: 2024-03-01
Payer: COMMERCIAL

## 2024-03-01 VITALS
WEIGHT: 237.63 LBS | SYSTOLIC BLOOD PRESSURE: 132 MMHG | TEMPERATURE: 98 F | HEART RATE: 79 BPM | HEIGHT: 71 IN | RESPIRATION RATE: 19 BRPM | BODY MASS INDEX: 33.27 KG/M2 | DIASTOLIC BLOOD PRESSURE: 86 MMHG | OXYGEN SATURATION: 97 %

## 2024-03-01 DIAGNOSIS — F90.9 ATTENTION DEFICIT HYPERACTIVITY DISORDER (ADHD), UNSPECIFIED ADHD TYPE: Primary | ICD-10-CM

## 2024-03-01 DIAGNOSIS — B00.9 HERPES: ICD-10-CM

## 2024-03-01 DIAGNOSIS — E55.9 VITAMIN D DEFICIENCY, UNSPECIFIED: ICD-10-CM

## 2024-03-01 DIAGNOSIS — F41.9 ANXIETY DISORDER, UNSPECIFIED TYPE: ICD-10-CM

## 2024-03-01 PROCEDURE — 1160F RVW MEDS BY RX/DR IN RCRD: CPT | Mod: ,,, | Performed by: NURSE PRACTITIONER

## 2024-03-01 PROCEDURE — 1159F MED LIST DOCD IN RCRD: CPT | Mod: ,,, | Performed by: NURSE PRACTITIONER

## 2024-03-01 PROCEDURE — 99213 OFFICE O/P EST LOW 20 MIN: CPT | Mod: ,,, | Performed by: NURSE PRACTITIONER

## 2024-03-01 PROCEDURE — 3079F DIAST BP 80-89 MM HG: CPT | Mod: ,,, | Performed by: NURSE PRACTITIONER

## 2024-03-01 PROCEDURE — 3075F SYST BP GE 130 - 139MM HG: CPT | Mod: ,,, | Performed by: NURSE PRACTITIONER

## 2024-03-01 PROCEDURE — 3008F BODY MASS INDEX DOCD: CPT | Mod: ,,, | Performed by: NURSE PRACTITIONER

## 2024-03-01 RX ORDER — LISDEXAMFETAMINE DIMESYLATE 50 MG/1
50 CAPSULE ORAL EVERY MORNING
Qty: 30 CAPSULE | Refills: 0 | Status: SHIPPED | OUTPATIENT
Start: 2024-03-01 | End: 2024-04-12

## 2024-03-01 RX ORDER — ERGOCALCIFEROL 1.25 MG/1
50000 CAPSULE ORAL
Qty: 12 CAPSULE | Refills: 1 | Status: SHIPPED | OUTPATIENT
Start: 2024-03-01

## 2024-03-01 RX ORDER — VALACYCLOVIR HYDROCHLORIDE 500 MG/1
500 TABLET, FILM COATED ORAL 2 TIMES DAILY
Qty: 180 TABLET | Refills: 1 | Status: SHIPPED | OUTPATIENT
Start: 2024-03-01 | End: 2024-08-28

## 2024-03-01 NOTE — PROGRESS NOTES
"   Rocio Talamantes NP   [id number] [facility] [address]  [place]  [address]      PATIENT NAME: Hemanth Cottrell  : 1980  DATE: 3/1/24  MRN: [id number]      Billing Provider: Rocio Talamantes NP  Level of Service: NM OFFICE/OUTPT VISIT, EST, LEVL III, 20-29 MIN  Patient PCP Information       Provider PCP Type    Rocio Talamantes NP General            Reason for Visit / Chief Complaint: Follow-up (Hemanth Cottrell (Brian) 44 year old male presents for a 3 month follow up and medication refills) and ADHD (Medication refill and would like to talk about potentially swapping to Vyvanse)         History of Present Illness / Problem Focused Workflow     44 year old male presents to clinic for check up and medication refill.  ADHD: Medication refill and would like to talk about potentially swapping to Vyvanse. States he is having difficulty finding his Adderall in stock and also feels as if it is not working well for him anymore. Would like to try Vyvanse.   He has been without his Adderall for a few days now and his anxiety seems to be worse- feels very overstimulated.           Review of Systems     @Review of Systems   Constitutional:  Negative for activity change, appetite change, fatigue and fever.   HENT:  Negative for nasal congestion, ear pain, rhinorrhea, sinus pressure/congestion and sore throat.    Eyes:  Negative for pain, redness, visual disturbance and eye dryness.   Respiratory:  Negative for cough and shortness of breath.    Cardiovascular:  Negative for chest pain and leg swelling.   Gastrointestinal:  Negative for abdominal distention, abdominal pain, constipation and diarrhea.   Endocrine: Negative for cold intolerance, heat intolerance and polyuria.   Genitourinary:  Negative for bladder incontinence, dysuria, frequency and urgency.   Musculoskeletal:  Negative for arthralgias, gait problem and myalgias.   Integumentary:  Negative for color change, rash and wound. "   Allergic/Immunologic: Negative for environmental allergies and food allergies.   Neurological:  Negative for dizziness, weakness, light-headedness and headaches.   Psychiatric/Behavioral:  Positive for decreased concentration. Negative for behavioral problems and sleep disturbance. The patient is nervous/anxious.        Medical / Social / Family History     Past Medical History:   Diagnosis Date    Achilles tendinitis     Allergic rhinitis     Anxiety disorder     COVID-19 08/2021    x 2 (02/2021)    Depressive disorder     GERD (gastroesophageal reflux disease)     Recurrent genital herpes simplex type 2 infection     Taking multiple medications for chronic disease     Urinary retention 05/05/2022    PVR per ED 5/1/22 337 ml       Past Surgical History:   Procedure Laterality Date    CHOLECYSTECTOMY  10/30/2023    COLONOSCOPY      ESOPHAGOGASTRODUODENOSCOPY      KNEE ARTHROSCOPY Left 2000    LAPAROSCOPIC CHOLECYSTECTOMY N/A 10/30/2023    Procedure: CHOLECYSTECTOMY, LAPAROSCOPIC;  Surgeon: Georgina Espinoza MD;  Location: [facility];  Service: General;  Laterality: N/A;    VASECTOMY  2005       Medications and Allergies     Medications  Outpatient Medications Marked as Taking for the 3/1/24 encounter (Office Visit) with Rocio Talamantes NP   Medication Sig Dispense Refill    Bifidobacterium infantis (ALIGN ORAL) Take by mouth. 2 chews daily      dextroamphetamine-amphetamine (ADDERALL) 20 mg tablet Take 1 tablet by mouth 2 (two) times a day. 60 tablet 0    omeprazole (PRILOSEC) 20 MG capsule Take 20 mg by mouth once daily.      [DISCONTINUED] ergocalciferol (ERGOCALCIFEROL) 50,000 unit Cap Take 1 capsule (50,000 Units total) by mouth every 7 days. 12 capsule 1    [DISCONTINUED] valACYclovir (VALTREX) 500 MG tablet Take 1 tablet (500 mg total) by mouth 2 (two) times daily. 180 tablet 1       Allergies  Review of patient's allergies indicates:  No Known Allergies    Physical Examination     Vitals:    03/01/24  1546   BP: 132/86   Pulse:    Resp:    Temp:      Physical Exam  Vitals and nursing note reviewed.   HENT:      Head: Normocephalic.      Right Ear: Tympanic membrane normal.      Left Ear: Tympanic membrane normal.      Nose: Nose normal.      Mouth/Throat:      Mouth: Mucous membranes are moist.      Pharynx: Oropharynx is clear. No posterior oropharyngeal erythema.   Eyes:      Conjunctiva/sclera: Conjunctivae normal.   Cardiovascular:      Rate and Rhythm: Normal rate and regular rhythm.      Pulses: Normal pulses.      Heart sounds: Normal heart sounds.   Pulmonary:      Effort: Pulmonary effort is normal.      Breath sounds: Normal breath sounds.   Abdominal:      General: Abdomen is flat. Bowel sounds are normal. There is no distension.      Palpations: Abdomen is soft.   Musculoskeletal:         General: No swelling or tenderness. Normal range of motion.      Cervical back: Normal range of motion.      Right lower leg: No edema.      Left lower leg: No edema.   Skin:     General: Skin is warm and dry.      Capillary Refill: Capillary refill takes less than 2 seconds.   Neurological:      Mental Status: He is alert. Mental status is at baseline.   Psychiatric:         Mood and Affect: Mood is anxious.         Behavior: Behavior normal.               Lab Results   Component Value Date    WBC 4.87 10/30/2023    HGB 16.1 10/30/2023    HCT 45.7 10/30/2023    MCV 87.4 10/30/2023     10/30/2023        CMP  Sodium   Date Value Ref Range Status   10/30/2023 140 136 - 145 mmol/L Final     Potassium   Date Value Ref Range Status   10/30/2023 4.1 3.5 - 5.1 mmol/L Final     Chloride   Date Value Ref Range Status   10/30/2023 107 98 - 107 mmol/L Final     CO2   Date Value Ref Range Status   10/30/2023 28 21 - 32 mmol/L Final     Glucose   Date Value Ref Range Status   10/30/2023 101 74 - 106 mg/dL Final     BUN   Date Value Ref Range Status   10/30/2023 9 7 - 18 mg/dL Final     Creatinine   Date Value Ref Range  Status   10/30/2023 0.94 0.70 - 1.30 mg/dL Final     Calcium   Date Value Ref Range Status   10/30/2023 8.7 8.5 - 10.1 mg/dL Final     Total Protein   Date Value Ref Range Status   10/30/2023 6.7 6.4 - 8.2 g/dL Final     Albumin   Date Value Ref Range Status   10/30/2023 3.7 3.5 - 5.0 g/dL Final     Bilirubin, Total   Date Value Ref Range Status   10/30/2023 0.9 >0.0 - 1.2 mg/dL Final     Alk Phos   Date Value Ref Range Status   10/30/2023 67 45 - 115 U/L Final     AST   Date Value Ref Range Status   10/30/2023 20 15 - 37 U/L Final     ALT   Date Value Ref Range Status   10/30/2023 37 16 - 61 U/L Final     Anion Gap   Date Value Ref Range Status   10/30/2023 9 7 - 16 mmol/L Final     eGFR   Date Value Ref Range Status   10/30/2023 102 >=60 mL/min/1.73m2 Final     Procedures   Assessment and Plan (including Health Maintenance)   :    Plan:     Problem List Items Addressed This Visit          Psychiatric    Attention deficit hyperactivity disorder (ADHD) - Primary    Current Assessment & Plan     States he feels like Adderall is no longer controlling his ADHD. Feels very overstimulated and unable to focus on anything. Would like to try Vyvanse instead of Adderall. ADHD testing within last 7 years,  checked and is ok. NO signs of abuse, no aberrant behavior noted. Instructed patient to take meds as ordered. Follow up in 3 months or as needed.         Anxiety disorder    Relevant Medications    lisdexamfetamine (VYVANSE) 50 MG capsule    lisdexamfetamine (VYVANSE) 50 MG capsule    lisdexamfetamine (VYVANSE) 50 MG capsule       ID    Herpes    Overview     Continue current medication         Relevant Medications    valACYclovir (VALTREX) 500 MG tablet       Endocrine    Vitamin D deficiency, unspecified    Relevant Medications    ergocalciferol (ERGOCALCIFEROL) 50,000 unit Cap       Health Maintenance Topics with due status: Not Due       Topic Last Completion Date    TETANUS VACCINE 01/21/2022    Hemoglobin A1c  (Diabetic Prevention Screening) 08/23/2023    Lipid Panel 08/23/2023       Future Appointments   Date Time Provider Department Center   8/22/2024  1:30 PM Rocio Talamantes NP [facility]        Health Maintenance Due   Topic Date Due    Colorectal Cancer Screening  Never done          Signature:  Rocio Talamantes NP  [facility]  [address] [place] [address], [place]  [address]    Date of encounter: 3/1/24

## 2024-03-03 PROBLEM — F41.9 ANXIETY DISORDER: Status: ACTIVE | Noted: 2024-03-03

## 2024-03-04 ENCOUNTER — CLINICAL SUPPORT (OUTPATIENT)
Dept: PRIMARY CARE CLINIC | Facility: CLINIC | Age: 44
End: 2024-03-04

## 2024-03-04 DIAGNOSIS — Z01.10 ENCOUNTER FOR HEARING EXAMINATION, UNSPECIFIED WHETHER ABNORMAL FINDINGS: ICD-10-CM

## 2024-03-04 DIAGNOSIS — Z46.89 ENCOUNTER FOR FITTING AND ADJUSTMENT OF OTHER SPECIFIED DEVICES: ICD-10-CM

## 2024-03-04 DIAGNOSIS — Z11.1 SCREENING-PULMONARY TB: Primary | ICD-10-CM

## 2024-03-04 PROCEDURE — 99080 SPECIAL REPORTS OR FORMS: CPT | Mod: ,,, | Performed by: NURSE PRACTITIONER

## 2024-03-04 PROCEDURE — 86580 TB INTRADERMAL TEST: CPT | Mod: ,,, | Performed by: NURSE PRACTITIONER

## 2024-03-04 PROCEDURE — 92552 PURE TONE AUDIOMETRY AIR: CPT | Mod: ,,, | Performed by: NURSE PRACTITIONER

## 2024-03-04 NOTE — ASSESSMENT & PLAN NOTE
States he feels like Adderall is no longer controlling his ADHD. Feels very overstimulated and unable to focus on anything. Would like to try Vyvanse instead of Adderall. ADHD testing within last 7 years,  checked and is ok. NO signs of abuse, no aberrant behavior noted. Instructed patient to take meds as ordered. Follow up in 3 months or as needed.

## 2024-03-04 NOTE — PROGRESS NOTES
Subjective     Patient ID: Hemanth Cottrell is a 44 y.o. male.    Chief Complaint: No chief complaint on file.    HPI  Review of Systems       Objective     Physical Exam       Assessment and Plan     1. Screening-pulmonary TB  -     POCT TB Skin Test Read    2. Encounter for fitting and adjustment of other specified devices    3. Encounter for hearing examination, unspecified whether abnormal findings        See scanned documents in .            No follow-ups on file.

## 2024-04-12 DIAGNOSIS — F41.9 ANXIETY DISORDER, UNSPECIFIED TYPE: ICD-10-CM

## 2024-04-12 RX ORDER — LISDEXAMFETAMINE DIMESYLATE 50 MG/1
50 CAPSULE ORAL EVERY MORNING
Qty: 30 CAPSULE | Refills: 0 | Status: SHIPPED | OUTPATIENT
Start: 2024-04-12 | End: 2024-05-06

## 2024-04-12 NOTE — PROGRESS NOTES
Patient contacted clinic and stated Vyvanse not available at [facility]. Requesting we send in to [facility] [place]. Will cancel fills for [facility] and send in new Rx to [facility].

## 2024-04-29 ENCOUNTER — PATIENT OUTREACH (OUTPATIENT)
Dept: ADMINISTRATIVE | Facility: HOSPITAL | Age: 44
End: 2024-04-29

## 2024-04-29 NOTE — PROGRESS NOTES
Population Health Chart Review & Patient Outreach Details      Further Action Needed If Patient Returns Outreach:      Pt needs appt for [facility] for htn sent to [facility] to schedule via staff message. Pt missed [facility] appt.      Updates Requested / Reviewed:     []  Care Everywhere    []     []  External Sources ([facility], [facility], [facility], etc.)    [] [facility]   [] [facility]   [] Other:    []  Care Team Updated   []  Removed  or Duplicate Orders   []  Immunization Reconciliation Completed / Queried    [] [place]   [] [place]   [] [place]   [] [place]      Health Maintenance Topics Addressed and Outreach Outcomes / Actions Taken:             Breast Cancer Screening []  Mammogram Order Placed    []  Mammogram Screening Scheduled    []  External Records Requested & Care Team Updated if Applicable    []  External Records Uploaded & Care Team Updated if Applicable    []  Pt Declined Scheduling Mammogram    []  Pt Will Schedule with External Provider / Order Routed & Care Team Updated if Applicable              Cervical Cancer Screening []  Pap Smear Scheduled in Primary Care or OBGYN    []  External Records Requested & Care Team Updated if Applicable       []  External Records Uploaded, Care Team Updated, & History Updated if Applicable    []  Patient Declined Scheduling Pap Smear    []  Patient Will Schedule with External Provider & Care Team Updated if Applicable                  Colorectal Cancer Screening []  Colonoscopy Case Request / Referral / Home Test Order Placed    []  External Records Requested & Care Team Updated if Applicable    []  External Records Uploaded, Care Team Updated, & History Updated if Applicable    []  Patient Declined Completing Colon Cancer Screening    []  Patient Will Schedule with External Provider & Care Team Updated if Applicable    []  Fit Kit Mailed (add the SmartPhrase under additional notes)    []  Reminded Patient to Complete Home Test                Diabetic Eye Exam  []  Eye Exam Screening Order Placed    []  Eye Camera Scheduled or Optometry/Ophthalmology Referral Placed    []  External Records Requested & Care Team Updated if Applicable    []  External Records Uploaded, Care Team Updated, & History Updated if Applicable    []  Patient Declined Scheduling Eye Exam    []  Patient Will Schedule with External Provider & Care Team Updated if Applicable             Blood Pressure Control []  Primary Care Follow Up Visit Scheduled     []  Remote Blood Pressure Reading Captured    []  Patient Declined Remote Reading or Scheduling Appt - Escalated to PCP    []  Patient Will Call Back or Send Portal Message with Reading                 HbA1c & Other Labs []  Overdue Lab(s) Ordered    []  Overdue Lab(s) Scheduled    []  External Records Uploaded & Care Team Updated if Applicable    []  Primary Care Follow Up Visit Scheduled     []  Reminded Patient to Complete A1c Home Test    []  Patient Declined Scheduling Labs or Will Call Back to Schedule    []  Patient Will Schedule with External Provider / Order Routed, & Care Team Updated if Applicable           Primary Care Appointment []  Primary Care Appt Scheduled    []  Patient Declined Scheduling or Will Call Back to Schedule    []  Pt Established with External Provider, Updated Care Team, & Informed Pt to Notify Payor if Applicable           Medication Adherence /    Statin Use []  Primary Care Appointment Scheduled    []  Patient Reminded to  Prescription    []  Patient Declined, Provider Notified if Needed    []  Sent Provider Message to Review to Evaluate Pt for Statin, Add Exclusion Dx Codes, Document   Exclusion in Problem List, Change Statin Intensity Level to Moderate or High Intensity if Applicable                Osteoporosis Screening []  Dexa Order Placed    []  Dexa Appointment Scheduled    []  External Records Requested & Care Team Updated    []  External Records Uploaded, Care Team Updated, & History Updated if  Applicable    []  Patient Declined Scheduling Dexa or Will Call Back to Schedule    []  Patient Will Schedule with External Provider / Order Routed & Care Team Updated if Applicable       Additional Notes:.

## 2024-05-06 ENCOUNTER — OFFICE VISIT (OUTPATIENT)
Dept: FAMILY MEDICINE | Facility: CLINIC | Age: 44
End: 2024-05-06
Payer: COMMERCIAL

## 2024-05-06 VITALS
BODY MASS INDEX: 34.72 KG/M2 | TEMPERATURE: 98 F | DIASTOLIC BLOOD PRESSURE: 86 MMHG | HEART RATE: 98 BPM | OXYGEN SATURATION: 96 % | HEIGHT: 71 IN | SYSTOLIC BLOOD PRESSURE: 138 MMHG | WEIGHT: 248 LBS | RESPIRATION RATE: 16 BRPM

## 2024-05-06 DIAGNOSIS — I10 PRIMARY HYPERTENSION: ICD-10-CM

## 2024-05-06 DIAGNOSIS — Z79.899 OTHER LONG TERM (CURRENT) DRUG THERAPY: ICD-10-CM

## 2024-05-06 DIAGNOSIS — F90.9 ATTENTION DEFICIT HYPERACTIVITY DISORDER (ADHD), UNSPECIFIED ADHD TYPE: Primary | ICD-10-CM

## 2024-05-06 LAB
CTP QC/QA: YES
POC (AMP) AMPHETAMINE: ABNORMAL
POC (BAR) BARBITURATES: NEGATIVE
POC (BUP) BUPRENORPHINE: NEGATIVE
POC (BZO) BENZODIAZEPINES: NEGATIVE
POC (COC) COCAINE: NEGATIVE
POC (MDMA) METHYLENEDIOXYMETHAMPHETAMINE 3,4: NEGATIVE
POC (MET) METHAMPHETAMINE: NEGATIVE
POC (MOP) OPIATES: NEGATIVE
POC (MTD) METHADONE: NEGATIVE
POC (OXY) OXYCODONE: NEGATIVE
POC (PCP) PHENCYCLIDINE: NEGATIVE
POC (TCA) TRICYCLIC ANTIDEPRESSANTS: NEGATIVE
POC TEMPERATURE (URINE): 96
POC THC: NEGATIVE

## 2024-05-06 PROCEDURE — 80305 DRUG TEST PRSMV DIR OPT OBS: CPT | Mod: QW,,, | Performed by: NURSE PRACTITIONER

## 2024-05-06 PROCEDURE — 3008F BODY MASS INDEX DOCD: CPT | Mod: ,,, | Performed by: NURSE PRACTITIONER

## 2024-05-06 PROCEDURE — 3079F DIAST BP 80-89 MM HG: CPT | Mod: ,,, | Performed by: NURSE PRACTITIONER

## 2024-05-06 PROCEDURE — 99213 OFFICE O/P EST LOW 20 MIN: CPT | Mod: ,,, | Performed by: NURSE PRACTITIONER

## 2024-05-06 PROCEDURE — 1160F RVW MEDS BY RX/DR IN RCRD: CPT | Mod: ,,, | Performed by: NURSE PRACTITIONER

## 2024-05-06 PROCEDURE — 1159F MED LIST DOCD IN RCRD: CPT | Mod: ,,, | Performed by: NURSE PRACTITIONER

## 2024-05-06 PROCEDURE — 3075F SYST BP GE 130 - 139MM HG: CPT | Mod: ,,, | Performed by: NURSE PRACTITIONER

## 2024-05-06 RX ORDER — DEXTROAMPHETAMINE SACCHARATE, AMPHETAMINE ASPARTATE, DEXTROAMPHETAMINE SULFATE AND AMPHETAMINE SULFATE 5; 5; 5; 5 MG/1; MG/1; MG/1; MG/1
1 TABLET ORAL 2 TIMES DAILY
Qty: 60 TABLET | Refills: 0 | Status: SHIPPED | OUTPATIENT
Start: 2024-06-06 | End: 2024-05-06

## 2024-05-06 RX ORDER — DEXTROAMPHETAMINE SACCHARATE, AMPHETAMINE ASPARTATE, DEXTROAMPHETAMINE SULFATE AND AMPHETAMINE SULFATE 5; 5; 5; 5 MG/1; MG/1; MG/1; MG/1
1 TABLET ORAL 2 TIMES DAILY
Qty: 60 TABLET | Refills: 0 | Status: SHIPPED | OUTPATIENT
Start: 2024-06-06

## 2024-05-06 RX ORDER — DEXTROAMPHETAMINE SACCHARATE, AMPHETAMINE ASPARTATE, DEXTROAMPHETAMINE SULFATE AND AMPHETAMINE SULFATE 5; 5; 5; 5 MG/1; MG/1; MG/1; MG/1
1 TABLET ORAL 2 TIMES DAILY
Qty: 60 TABLET | Refills: 0 | Status: SHIPPED | OUTPATIENT
Start: 2024-07-06

## 2024-05-06 RX ORDER — DEXTROAMPHETAMINE SACCHARATE, AMPHETAMINE ASPARTATE, DEXTROAMPHETAMINE SULFATE AND AMPHETAMINE SULFATE 5; 5; 5; 5 MG/1; MG/1; MG/1; MG/1
1 TABLET ORAL 2 TIMES DAILY
Qty: 60 TABLET | Refills: 0 | Status: CANCELLED | OUTPATIENT
Start: 2024-05-06

## 2024-05-06 RX ORDER — LISINOPRIL 10 MG/1
10 TABLET ORAL DAILY
Qty: 90 TABLET | Refills: 1 | Status: SHIPPED | OUTPATIENT
Start: 2024-05-06 | End: 2025-05-06

## 2024-05-06 NOTE — ASSESSMENT & PLAN NOTE
BP elevated again at today's visit. He denies headache, blurred vision, or chest pain. We will start him on Lisinopril 10mg daily. He is to monitor BP at home and keep log. Bring with him to next appt. Lifestyle changes to help lower blood pressure reviewed, DASH eating plan encouraged, Weight loss of 0.5-1 lb/week suggested, stressed the importance of routine exercise 30 minutes per day for 3-5 days/week, sodium restriction, and alcohol in moderation less than 2 drinks a day. Patient was encouraged to check blood pressure daily (at home and work), blood pressure log provided, bring BP log to next appointment, and to monitor for discussed worsening symptoms that require emergent medical attention.

## 2024-05-06 NOTE — PROGRESS NOTES
Rocio Talamantes NP   [place] [id number] [facility] [place] [address]  [place]  [address]      PATIENT NAME: Hemanth Cottrell  : 1980  DATE: 24  MRN: [id number]      Billing Provider: Rocio Talamantes NP  Level of Service: [place] OFFICE/OUTPT VISIT, EST, LEVL III, 20-29 MIN  Patient PCP Information       Provider PCP Type    Rocio Talamantes NP General            Reason for Visit / Chief Complaint: Follow-up (Patient is a 44 year old male presenting today for a follow-up on ADHD with medication refills. ) and ADHD (He states that he would like to change medication back to Adderall. He states he feels like the Vyvanse is not helping us much as the Adderall did. )         History of Present Illness / Problem Focused Workflow     Patient is a 44 year old male presenting today for a follow-up on ADHD with medication refills.   ADHD: He states that he would like to change medication back to Adderall. He states he feels like the Vyvanse is not helping us much as the Adderall did.   His blood pressure is elevated at today's visit and has been elevated some in the past. He states he has never taken anything for HTN but has had some previously elevated readings. Admits he has gained about 15lbs in the last few months and believes this is contributing to HTN as well.           Review of Systems     @Review of Systems   Constitutional:  Negative for activity change, appetite change, fatigue and fever.   HENT:  Negative for nasal congestion, ear pain, rhinorrhea, sinus pressure/congestion and sore throat.    Eyes:  Negative for pain, redness, visual disturbance and eye dryness.   Respiratory:  Negative for cough and shortness of breath.    Cardiovascular:  Negative for chest pain and leg swelling.   Gastrointestinal:  Negative for abdominal distention, abdominal pain, constipation and diarrhea.   Endocrine: Negative for cold intolerance, heat intolerance and polyuria.   Genitourinary:  Negative for bladder  incontinence, dysuria, frequency and urgency.   Musculoskeletal:  Negative for arthralgias, gait problem and myalgias.   Integumentary:  Negative for color change, rash and wound.   Allergic/Immunologic: Negative for environmental allergies and food allergies.   Neurological:  Negative for dizziness, weakness, light-headedness and headaches.   Psychiatric/Behavioral:  Negative for behavioral problems and sleep disturbance.        Medical / Social / Family History     Past Medical History:   Diagnosis Date    Achilles tendinitis     Allergic rhinitis     Anxiety disorder     COVID-19 08/2021    x 2 (02/2021)    Depressive disorder     GERD (gastroesophageal reflux disease)     Recurrent genital herpes simplex type 2 infection     Taking multiple medications for chronic disease     Urinary retention 05/05/2022    PVR per ED 5/1/22 337 ml       Past Surgical History:   Procedure Laterality Date    CHOLECYSTECTOMY  10/30/2023    COLONOSCOPY      ESOPHAGOGASTRODUODENOSCOPY      KNEE ARTHROSCOPY Left 2000    LAPAROSCOPIC CHOLECYSTECTOMY N/A 10/30/2023    Procedure: CHOLECYSTECTOMY, LAPAROSCOPIC;  Surgeon: Georgina Espinoza MD;  Location: [facility];  Service: General;  Laterality: N/A;    VASECTOMY  2005       Medications and Allergies     Medications  Current Outpatient Medications   Medication Sig Dispense Refill    Bifidobacterium infantis (ALIGN ORAL) Take by mouth. 2 chews daily      ergocalciferol (ERGOCALCIFEROL) 50,000 unit Cap Take 1 capsule (50,000 Units total) by mouth every 7 days. 12 capsule 1    omeprazole (PRILOSEC) 20 MG capsule Take 20 mg by mouth once daily.      valACYclovir (VALTREX) 500 MG tablet Take 1 tablet (500 mg total) by mouth 2 (two) times daily. 180 tablet 1    [START ON 7/6/2024] dextroamphetamine-amphetamine (ADDERALL) 20 mg tablet Take 1 tablet by mouth 2 (two) times a day. 60 tablet 0    [START ON 6/6/2024] dextroamphetamine-amphetamine (ADDERALL) 20 mg tablet Take 1 tablet by mouth 2  (two) times a day. 60 tablet 0    [START ON 6/6/2024] dextroamphetamine-amphetamine (ADDERALL) 20 mg tablet Take 1 tablet by mouth 2 (two) times a day. 60 tablet 0    lisinopriL 10 MG tablet Take 1 tablet (10 mg total) by mouth once daily. 90 tablet 1     No current facility-administered medications for this visit.       Allergies  Review of patient's allergies indicates:  No Known Allergies    Physical Examination     Vitals:    05/06/24 1615   BP: 138/86   Pulse:    Resp:    Temp:      Physical Exam  Vitals and nursing note reviewed.   Constitutional:       Appearance: He is obese.   HENT:      Head: Normocephalic.      Right Ear: Tympanic membrane normal.      Left Ear: Tympanic membrane normal.      Nose: Nose normal.      Mouth/Throat:      Mouth: Mucous membranes are moist.      Pharynx: Oropharynx is clear. No posterior oropharyngeal erythema.   Eyes:      Conjunctiva/sclera: Conjunctivae normal.   Cardiovascular:      Rate and Rhythm: Normal rate and regular rhythm.      Pulses: Normal pulses.      Heart sounds: Normal heart sounds.   Pulmonary:      Effort: Pulmonary effort is normal.      Breath sounds: Normal breath sounds.   Abdominal:      General: Abdomen is flat. Bowel sounds are normal. There is no distension.      Palpations: Abdomen is soft.   Musculoskeletal:         General: No swelling or tenderness. Normal range of motion.      Cervical back: Normal range of motion.      Right lower leg: No edema.      Left lower leg: No edema.   Skin:     General: Skin is warm and dry.      Capillary Refill: Capillary refill takes less than 2 seconds.   Neurological:      Mental Status: He is alert. Mental status is at baseline.   Psychiatric:         Mood and Affect: Mood normal.         Behavior: Behavior normal.               Lab Results   Component Value Date    WBC 4.87 10/30/2023    HGB 16.1 10/30/2023    HCT 45.7 10/30/2023    MCV 87.4 10/30/2023     10/30/2023        CMP  Sodium   Date Value Ref  Range Status   10/30/2023 140 136 - 145 mmol/L Final     Potassium   Date Value Ref Range Status   10/30/2023 4.1 3.5 - 5.1 mmol/L Final     Chloride   Date Value Ref Range Status   10/30/2023 107 98 - 107 mmol/L Final     CO2   Date Value Ref Range Status   10/30/2023 28 21 - 32 mmol/L Final     Glucose   Date Value Ref Range Status   10/30/2023 101 74 - 106 mg/dL Final     BUN   Date Value Ref Range Status   10/30/2023 9 7 - 18 mg/dL Final     Creatinine   Date Value Ref Range Status   10/30/2023 0.94 0.70 - 1.30 mg/dL Final     Calcium   Date Value Ref Range Status   10/30/2023 8.7 8.5 - 10.1 mg/dL Final     Total Protein   Date Value Ref Range Status   10/30/2023 6.7 6.4 - 8.2 g/dL Final     Albumin   Date Value Ref Range Status   10/30/2023 3.7 3.5 - 5.0 g/dL Final     Bilirubin, Total   Date Value Ref Range Status   10/30/2023 0.9 >0.0 - 1.2 mg/dL Final     Alk Phos   Date Value Ref Range Status   10/30/2023 67 45 - 115 U/L Final     AST   Date Value Ref Range Status   10/30/2023 20 15 - 37 U/L Final     ALT   Date Value Ref Range Status   10/30/2023 37 16 - 61 U/L Final     Anion Gap   Date Value Ref Range Status   10/30/2023 9 7 - 16 mmol/L Final     eGFR   Date Value Ref Range Status   10/30/2023 102 >=60 mL/min/1.73m2 Final     Procedures   Assessment and Plan (including Health Maintenance)   :    Plan:     Problem List Items Addressed This Visit          Psychiatric    Attention deficit hyperactivity disorder (ADHD) - Primary    Current Assessment & Plan     Will change back to Adderall 20mg BID as patient was previously taking. States this kept him better controlled than the Vyvanse.  checked and is good. UDS consistent with use. Narcotics- you were given a narcotic medication. Discussed appropriate administration of medications, side effects, safety measures-including not sharing prescriptions with others, keeping medications in a secure location, etc.           Relevant Medications     dextroamphetamine-amphetamine (ADDERALL) 20 mg tablet (Start on 7/6/2024)    dextroamphetamine-amphetamine (ADDERALL) 20 mg tablet (Start on 6/6/2024)    dextroamphetamine-amphetamine (ADDERALL) 20 mg tablet (Start on 6/6/2024)       Cardiac/Vascular    Primary hypertension    Current Assessment & Plan     BP elevated again at today's visit. He denies headache, blurred vision, or chest pain. We will start him on Lisinopril 10mg daily. He is to monitor BP at home and keep log. Bring with him to next appt. Lifestyle changes to help lower blood pressure reviewed, DASH eating plan encouraged, Weight loss of 0.5-1 lb/week suggested, stressed the importance of routine exercise 30 minutes per day for 3-5 days/week, sodium restriction, and alcohol in moderation less than 2 drinks a day. Patient was encouraged to check blood pressure daily (at home and work), blood pressure log provided, bring BP log to next appointment, and to monitor for discussed worsening symptoms that require emergent medical attention.           Relevant Medications    lisinopriL 10 MG tablet     Other Visit Diagnoses       Other long term (current) drug therapy        Relevant Orders    POCT Urine Drug Screen Presump (Completed)            Health Maintenance Topics with due status: Not Due       Topic Last Completion Date    TETANUS VACCINE 01/21/2022    Hemoglobin A1c (Diabetic Prevention Screening) 08/23/2023    Lipid Panel 08/23/2023       Future Appointments   Date Time Provider Department Center   8/22/2024  1:30 PM Rocio Talamantes NP [facility]        Health Maintenance Due   Topic Date Due    Colorectal Cancer Screening  Never done          Signature:  Rocio Talamantes NP  [address] [place]  [address]    Date of encounter: 5/6/24

## 2024-05-06 NOTE — ASSESSMENT & PLAN NOTE
Will change back to Adderall 20mg BID as patient was previously taking. States this kept him better controlled than the Vyvanse.  checked and is good. UDS consistent with use. Narcotics- you were given a narcotic medication. Discussed appropriate administration of medications, side effects, safety measures-including not sharing prescriptions with others, keeping medications in a secure location, etc.

## 2024-10-29 ENCOUNTER — OFFICE VISIT (OUTPATIENT)
Dept: FAMILY MEDICINE | Facility: CLINIC | Age: 44
End: 2024-10-29
Payer: COMMERCIAL

## 2024-10-29 VITALS
BODY MASS INDEX: 34.58 KG/M2 | DIASTOLIC BLOOD PRESSURE: 82 MMHG | HEIGHT: 71 IN | RESPIRATION RATE: 20 BRPM | HEART RATE: 92 BPM | SYSTOLIC BLOOD PRESSURE: 148 MMHG | OXYGEN SATURATION: 98 % | WEIGHT: 247 LBS | TEMPERATURE: 98 F

## 2024-10-29 DIAGNOSIS — F90.9 ATTENTION DEFICIT HYPERACTIVITY DISORDER (ADHD), UNSPECIFIED ADHD TYPE: ICD-10-CM

## 2024-10-29 DIAGNOSIS — M54.50 ACUTE MIDLINE LOW BACK PAIN WITHOUT SCIATICA: ICD-10-CM

## 2024-10-29 DIAGNOSIS — I10 PRIMARY HYPERTENSION: Primary | ICD-10-CM

## 2024-10-29 DIAGNOSIS — R53.83 FATIGUE, UNSPECIFIED TYPE: ICD-10-CM

## 2024-10-29 DIAGNOSIS — B00.9 HERPES: ICD-10-CM

## 2024-10-29 PROCEDURE — 80305 DRUG TEST PRSMV DIR OPT OBS: CPT | Mod: QW,,, | Performed by: NURSE PRACTITIONER

## 2024-10-29 PROCEDURE — 1160F RVW MEDS BY RX/DR IN RCRD: CPT | Mod: CPTII,,, | Performed by: NURSE PRACTITIONER

## 2024-10-29 PROCEDURE — 1159F MED LIST DOCD IN RCRD: CPT | Mod: CPTII,,, | Performed by: NURSE PRACTITIONER

## 2024-10-29 PROCEDURE — 3077F SYST BP >= 140 MM HG: CPT | Mod: CPTII,,, | Performed by: NURSE PRACTITIONER

## 2024-10-29 PROCEDURE — 4010F ACE/ARB THERAPY RXD/TAKEN: CPT | Mod: CPTII,,, | Performed by: NURSE PRACTITIONER

## 2024-10-29 PROCEDURE — 3008F BODY MASS INDEX DOCD: CPT | Mod: CPTII,,, | Performed by: NURSE PRACTITIONER

## 2024-10-29 PROCEDURE — 3079F DIAST BP 80-89 MM HG: CPT | Mod: CPTII,,, | Performed by: NURSE PRACTITIONER

## 2024-10-29 PROCEDURE — 99214 OFFICE O/P EST MOD 30 MIN: CPT | Mod: 25,,, | Performed by: NURSE PRACTITIONER

## 2024-10-29 PROCEDURE — 96372 THER/PROPH/DIAG INJ SC/IM: CPT | Mod: ,,, | Performed by: NURSE PRACTITIONER

## 2024-10-29 RX ORDER — METHYLPREDNISOLONE ACETATE 40 MG/ML
40 INJECTION, SUSPENSION INTRA-ARTICULAR; INTRALESIONAL; INTRAMUSCULAR; SOFT TISSUE ONCE
Status: COMPLETED | OUTPATIENT
Start: 2024-10-29 | End: 2024-10-29

## 2024-10-29 RX ORDER — LISINOPRIL 10 MG/1
10 TABLET ORAL DAILY
Qty: 90 TABLET | Refills: 1 | Status: CANCELLED | OUTPATIENT
Start: 2024-10-29 | End: 2025-10-29

## 2024-10-29 RX ORDER — LISINOPRIL 20 MG/1
20 TABLET ORAL DAILY
Qty: 90 TABLET | Refills: 1 | Status: SHIPPED | OUTPATIENT
Start: 2024-10-29 | End: 2025-10-29

## 2024-10-29 RX ORDER — METHYLPREDNISOLONE 4 MG/1
TABLET ORAL
COMMUNITY
Start: 2024-07-25

## 2024-10-29 RX ORDER — AZITHROMYCIN 250 MG/1
250 TABLET, FILM COATED ORAL DAILY
COMMUNITY
Start: 2024-07-25

## 2024-10-29 RX ORDER — LISDEXAMFETAMINE DIMESYLATE 40 MG/1
40 CAPSULE ORAL DAILY
Qty: 30 CAPSULE | Refills: 0 | Status: SHIPPED | OUTPATIENT
Start: 2024-10-29

## 2024-10-29 RX ORDER — VALACYCLOVIR HYDROCHLORIDE 500 MG/1
500 TABLET, FILM COATED ORAL 2 TIMES DAILY
Qty: 180 TABLET | Refills: 1 | Status: SHIPPED | OUTPATIENT
Start: 2024-10-29 | End: 2025-04-27

## 2024-10-29 RX ORDER — METHOCARBAMOL 500 MG/1
500 TABLET, FILM COATED ORAL 4 TIMES DAILY
Qty: 40 TABLET | Refills: 1 | Status: SHIPPED | OUTPATIENT
Start: 2024-10-29 | End: 2024-11-18

## 2024-10-29 RX ORDER — BENZONATATE 200 MG/1
200 CAPSULE ORAL 3 TIMES DAILY PRN
COMMUNITY
Start: 2024-07-25

## 2024-10-29 RX ADMIN — METHYLPREDNISOLONE ACETATE 40 MG: 40 INJECTION, SUSPENSION INTRA-ARTICULAR; INTRALESIONAL; INTRAMUSCULAR; SOFT TISSUE at 09:10

## 2024-10-30 ENCOUNTER — CLINICAL SUPPORT (OUTPATIENT)
Dept: FAMILY MEDICINE | Facility: CLINIC | Age: 44
End: 2024-10-30
Payer: COMMERCIAL

## 2024-10-30 VITALS — SYSTOLIC BLOOD PRESSURE: 136 MMHG | DIASTOLIC BLOOD PRESSURE: 84 MMHG

## 2024-10-30 DIAGNOSIS — I10 PRIMARY HYPERTENSION: Primary | ICD-10-CM

## 2024-11-08 DIAGNOSIS — E29.1 TESTOSTERONE DEFICIENCY IN MALE: Primary | ICD-10-CM

## 2024-11-08 RX ORDER — TESTOSTERONE CYPIONATE 200 MG/ML
200 INJECTION, SOLUTION INTRAMUSCULAR
Qty: 2 ML | Refills: 3 | Status: SHIPPED | OUTPATIENT
Start: 2024-11-08 | End: 2024-11-08

## 2024-11-08 RX ORDER — TESTOSTERONE CYPIONATE 200 MG/ML
200 INJECTION, SOLUTION INTRAMUSCULAR
Qty: 2 ML | Refills: 3 | Status: SHIPPED | OUTPATIENT
Start: 2024-11-08 | End: 2025-05-09

## 2025-01-02 ENCOUNTER — OFFICE VISIT (OUTPATIENT)
Dept: FAMILY MEDICINE | Facility: CLINIC | Age: 45
End: 2025-01-02
Payer: COMMERCIAL

## 2025-01-02 VITALS
OXYGEN SATURATION: 95 % | HEART RATE: 88 BPM | DIASTOLIC BLOOD PRESSURE: 87 MMHG | BODY MASS INDEX: 34.58 KG/M2 | WEIGHT: 247 LBS | RESPIRATION RATE: 18 BRPM | HEIGHT: 71 IN | TEMPERATURE: 98 F | SYSTOLIC BLOOD PRESSURE: 143 MMHG

## 2025-01-02 DIAGNOSIS — Z79.899 HIGH RISK MEDICATION USE: ICD-10-CM

## 2025-01-02 DIAGNOSIS — I10 PRIMARY HYPERTENSION: ICD-10-CM

## 2025-01-02 DIAGNOSIS — Z79.899 ENCOUNTER FOR LONG-TERM (CURRENT) USE OF MEDICATIONS: ICD-10-CM

## 2025-01-02 DIAGNOSIS — S29.9XXA RIB INJURY: ICD-10-CM

## 2025-01-02 DIAGNOSIS — E55.9 VITAMIN D DEFICIENCY, UNSPECIFIED: ICD-10-CM

## 2025-01-02 DIAGNOSIS — E29.1 TESTOSTERONE DEFICIENCY IN MALE: ICD-10-CM

## 2025-01-02 DIAGNOSIS — F90.9 ATTENTION DEFICIT HYPERACTIVITY DISORDER (ADHD), UNSPECIFIED ADHD TYPE: Primary | ICD-10-CM

## 2025-01-02 LAB
25(OH)D3 SERPL-MCNC: 24.1 NG/ML (ref 30–80)
CTP QC/QA: YES
POC (AMP) AMPHETAMINE: ABNORMAL
POC (BAR) BARBITURATES: NEGATIVE
POC (BUP) BUPRENORPHINE: NEGATIVE
POC (BZO) BENZODIAZEPINES: NEGATIVE
POC (COC) COCAINE: NEGATIVE
POC (MDMA) METHYLENEDIOXYMETHAMPHETAMINE 3,4: NEGATIVE
POC (MET) METHAMPHETAMINE: NEGATIVE
POC (MOP) OPIATES: NEGATIVE
POC (MTD) METHADONE: NEGATIVE
POC (OXY) OXYCODONE: NEGATIVE
POC (PCP) PHENCYCLIDINE: NEGATIVE
POC (TCA) TRICYCLIC ANTIDEPRESSANTS: NEGATIVE
POC TEMPERATURE (URINE): 98
POC THC: NEGATIVE
PSA SERPL-MCNC: 1.36 NG/ML

## 2025-01-02 PROCEDURE — 80305 DRUG TEST PRSMV DIR OPT OBS: CPT | Mod: QW,,, | Performed by: NURSE PRACTITIONER

## 2025-01-02 PROCEDURE — 99214 OFFICE O/P EST MOD 30 MIN: CPT | Mod: ,,, | Performed by: NURSE PRACTITIONER

## 2025-01-02 PROCEDURE — 3077F SYST BP >= 140 MM HG: CPT | Mod: CPTII,,, | Performed by: NURSE PRACTITIONER

## 2025-01-02 PROCEDURE — 3079F DIAST BP 80-89 MM HG: CPT | Mod: CPTII,,, | Performed by: NURSE PRACTITIONER

## 2025-01-02 PROCEDURE — 1159F MED LIST DOCD IN RCRD: CPT | Mod: CPTII,,, | Performed by: NURSE PRACTITIONER

## 2025-01-02 PROCEDURE — 1160F RVW MEDS BY RX/DR IN RCRD: CPT | Mod: CPTII,,, | Performed by: NURSE PRACTITIONER

## 2025-01-02 PROCEDURE — 3008F BODY MASS INDEX DOCD: CPT | Mod: CPTII,,, | Performed by: NURSE PRACTITIONER

## 2025-01-02 RX ORDER — TESTOSTERONE CYPIONATE 200 MG/ML
200 INJECTION, SOLUTION INTRAMUSCULAR
Qty: 2 ML | Refills: 3 | Status: SHIPPED | OUTPATIENT
Start: 2025-01-02 | End: 2025-07-03

## 2025-01-02 RX ORDER — LISDEXAMFETAMINE DIMESYLATE 40 MG/1
40 CAPSULE ORAL DAILY
Qty: 30 CAPSULE | Refills: 0 | Status: SHIPPED | OUTPATIENT
Start: 2025-01-02

## 2025-01-02 RX ORDER — ERGOCALCIFEROL 1.25 MG/1
50000 CAPSULE ORAL
Qty: 12 CAPSULE | Refills: 1 | Status: SHIPPED | OUTPATIENT
Start: 2025-01-02

## 2025-01-02 NOTE — PROGRESS NOTES
Rocio Talamantes NP   [id number] [facility] [address]  [place]  [address]      PATIENT NAME: Hemanth Cottrell  : 1980  DATE: 25  MRN: [id number]      Billing Provider: Rocio Talamantes NP  Level of Service: IN OFFICE/OUTPT VISIT, EST, LEVL IV, 30-39 MIN  Patient PCP Information       Provider PCP Type    Rocio Talamantes NP General            Reason for Visit / Chief Complaint: Medication Refill (Patient is 44 year old male, presents to clinic for medication refill. Patient states has not taken blood pressure medication this morning. 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 Patient states pain tender to touch and patient states unsure  if rib fracture. )         History of Present Illness / Problem Focused Workflow     44 year old male, presents to clinic for medication refill. He reports he is well controlled on current dosage of Vyvanse. BP slightly elevated but patient states has not taken blood pressure medication this morning. Reports he has been monitoring at home and it has been running around 130/80s.   He does report he fell at work about a month ago and landed on something on his left side. He has had left rib area pain since then. States area is tender to touch and hurts when he lays down.            Review of Systems     @Review of Systems   Constitutional:  Negative for activity change, appetite change, fatigue and fever.   HENT:  Negative for nasal congestion, ear pain, rhinorrhea, sinus pressure/congestion and sore throat.    Eyes:  Negative for pain, redness, visual disturbance and eye dryness.   Respiratory:  Negative for cough and shortness of breath.    Cardiovascular:  Negative for chest pain and leg swelling.   Gastrointestinal:  Negative for abdominal distention, abdominal pain, constipation and diarrhea.   Endocrine: Negative for cold intolerance, heat intolerance and polyuria.   Genitourinary:  Negative for  bladder incontinence, dysuria, frequency and urgency.   Musculoskeletal:  Negative for arthralgias, gait problem and myalgias.        Left rib area pain.    Integumentary:  Negative for color change, rash and wound.   Allergic/Immunologic: Negative for environmental allergies and food allergies.   Neurological:  Negative for dizziness, weakness, light-headedness and headaches.   Psychiatric/Behavioral:  Negative for behavioral problems and sleep disturbance.        Medical / Social / Family History     Past Medical History:   Diagnosis Date    Achilles tendinitis     Allergic rhinitis     Anxiety disorder     COVID-19 08/2021    x 2 (02/2021)    Depressive disorder     GERD (gastroesophageal reflux disease)     Recurrent genital herpes simplex type 2 infection     Taking multiple medications for chronic disease     Urinary retention 05/05/2022    PVR per ED 5/1/22 337 ml       Past Surgical History:   Procedure Laterality Date    CHOLECYSTECTOMY  10/30/2023    COLONOSCOPY      ESOPHAGOGASTRODUODENOSCOPY      KNEE ARTHROSCOPY Left 2000    LAPAROSCOPIC CHOLECYSTECTOMY N/A 10/30/2023    Procedure: CHOLECYSTECTOMY, LAPAROSCOPIC;  Surgeon: Georgina Espinoza MD;  Location: [facility];  Service: General;  Laterality: N/A;    VASECTOMY  2005       Medications and Allergies     Medications  Outpatient Medications Marked as Taking for the 1/2/25 encounter (Office Visit) with Rocio Talamantes NP   Medication Sig Dispense Refill    lisinopriL (PRINIVIL,ZESTRIL) 20 MG tablet Take 1 tablet (20 mg total) by mouth once daily. 90 tablet 1    omeprazole (PRILOSEC) 20 MG capsule Take 20 mg by mouth once daily.      valACYclovir (VALTREX) 500 MG tablet Take 1 tablet (500 mg total) by mouth 2 (two) times daily. 180 tablet 1    [DISCONTINUED] ergocalciferol (ERGOCALCIFEROL) 50,000 unit Cap Take 1 capsule (50,000 Units total) by mouth every 7 days. 12 capsule 1    [DISCONTINUED] lisdexamfetamine (VYVANSE) 40 MG Cap Take 1 capsule (40  mg total) by mouth once daily. 30 capsule 0    [DISCONTINUED] testosterone cypionate (DEPOTESTOTERONE CYPIONATE) 200 mg/mL injection Inject 1 mL (200 mg total) into the muscle every 14 (fourteen) days. 2 mL 3       Allergies  Review of patient's allergies indicates:  No Known Allergies    Physical Examination     Vitals:    01/02/25 1052   BP: (!) 143/87   Pulse: 88   Resp: 18   Temp: 98.3 °F (36.8 °C)     Physical Exam  Vitals and nursing note reviewed.   HENT:      Head: Normocephalic.      Nose: Nose normal.      Mouth/Throat:      Mouth: Mucous membranes are moist.      Pharynx: Oropharynx is clear. No posterior oropharyngeal erythema.   Eyes:      Conjunctiva/sclera: Conjunctivae normal.   Cardiovascular:      Rate and Rhythm: Normal rate and regular rhythm.      Pulses: Normal pulses.      Heart sounds: Normal heart sounds.   Pulmonary:      Effort: Pulmonary effort is normal.      Breath sounds: Normal breath sounds.   Chest:      Chest wall: Tenderness present.       Abdominal:      General: Abdomen is flat. Bowel sounds are normal. There is no distension.      Palpations: Abdomen is soft.   Musculoskeletal:         General: No swelling or tenderness. Normal range of motion.      Cervical back: Normal range of motion.      Right lower leg: No edema.      Left lower leg: No edema.   Skin:     General: Skin is warm and dry.      Capillary Refill: Capillary refill takes less than 2 seconds.   Neurological:      Mental Status: He is alert. Mental status is at baseline.   Psychiatric:         Mood and Affect: Mood normal.         Behavior: Behavior normal.               Lab Results   Component Value Date    WBC 9.76 10/30/2024    HGB 15.9 10/30/2024    HCT 49.2 10/30/2024    MCV 94.6 10/30/2024     10/30/2024        CMP  Sodium   Date Value Ref Range Status   10/30/2024 136 136 - 145 mmol/L Final     Potassium   Date Value Ref Range Status   10/30/2024 3.7 3.5 - 5.1 mmol/L Final     Chloride   Date Value  Ref Range Status   10/30/2024 104 98 - 107 mmol/L Final     CO2   Date Value Ref Range Status   10/30/2024 30 21 - 32 mmol/L Final     Glucose   Date Value Ref Range Status   10/30/2024 86 74 - 106 mg/dL Final     BUN   Date Value Ref Range Status   10/30/2024 9 7 - 18 mg/dL Final     Creatinine   Date Value Ref Range Status   10/30/2024 0.91 0.70 - 1.30 mg/dL Final     Calcium   Date Value Ref Range Status   10/30/2024 8.6 8.5 - 10.1 mg/dL Final     Total Protein   Date Value Ref Range Status   10/30/2024 7.4 6.4 - 8.2 g/dL Final     Albumin   Date Value Ref Range Status   10/30/2024 4.1 3.5 - 5.0 g/dL Final     Bilirubin, Total   Date Value Ref Range Status   10/30/2024 0.9 >0.0 - 1.2 mg/dL Final     Alk Phos   Date Value Ref Range Status   10/30/2024 61 45 - 115 U/L Final     AST   Date Value Ref Range Status   10/30/2024 26 15 - 37 U/L Final     ALT   Date Value Ref Range Status   10/30/2024 43 16 - 61 U/L Final     Anion Gap   Date Value Ref Range Status   10/30/2024 6 (L) 7 - 16 mmol/L Final     eGFR   Date Value Ref Range Status   10/30/2024 105 >=60 mL/min/1.73m2 Final     Procedures   Assessment and Plan (including Health Maintenance)   :    Plan:     Problem List Items Addressed This Visit          Psychiatric    Attention deficit hyperactivity disorder (ADHD) - Primary    Current Assessment & Plan     Well controlled on Vyvanse. Continue at current dosage.  checked and is good. UDS consistent with use. Follow up in 3 months or as needed.          Relevant Medications    lisdexamfetamine (VYVANSE) 40 MG Cap    lisdexamfetamine (VYVANSE) 40 MG Cap    lisdexamfetamine (VYVANSE) 40 MG Cap       Cardiac/Vascular    Primary hypertension    Current Assessment & Plan     Slightly elevated but has not had meds this AM. Continue to monitor at home and keep log. Follow up in clinic in 2 weeks for recheck of BP and bring log.   Lifestyle changes to help lower blood pressure reviewed, DASH eating plan encouraged,  Weight loss of 0.5-1 lb/week suggested, stressed the importance of routine exercise 30 minutes per day for 3-5 days/week, sodium restriction, and alcohol in moderation less than 2 drinks a day. Patient was encouraged to check blood pressure daily (at home and work), blood pressure log provided, bring BP log to next appointment, and to monitor for discussed worsening symptoms that require emergent medical attention.              Endocrine    Vitamin D deficiency, unspecified    Relevant Medications    ergocalciferol (ERGOCALCIFEROL) 50,000 unit Cap    Other Relevant Orders    Vitamin D    Testosterone deficiency in male    Current Assessment & Plan     Reports symptoms improved since starting Testosterone supplementation. PSA obtained today. Continue at current dosage. Follow up in 3 months or as needed,          Relevant Medications    testosterone cypionate (DEPOTESTOTERONE CYPIONATE) 200 mg/mL injection       Orthopedic    Rib injury    Current Assessment & Plan     Xray obtained but unable to visualize any fracture. Encouraged him to take deep breaths to avoid atelectasis, brace with pillow if needed. Follow up if symptoms persist or worsen.          Relevant Orders    X-Ray Ribs 2 View Left     Other Visit Diagnoses       Encounter for long-term (current) use of medications        Relevant Medications    ergocalciferol (ERGOCALCIFEROL) 50,000 unit Cap    High risk medication use        Relevant Orders    POCT Urine Drug Screen Presump (Completed)            Health Maintenance Topics with due status: Not Due       Topic Last Completion Date    TETANUS VACCINE 01/21/2022    Hemoglobin A1c (Diabetic Prevention Screening) 10/30/2024    Lipid Panel 10/30/2024    RSV Vaccine (Age 60+ and Pregnant patients) Not Due       No future appointments.     Health Maintenance Due   Topic Date Due    Colorectal Cancer Screening  Never done          Signature:  Rocio Talamantes NP  [id number] [facility]  [address]  [place]  [address]    Date of encounter: 1/2/25

## 2025-01-02 NOTE — ASSESSMENT & PLAN NOTE
Xray obtained but unable to visualize any fracture. Encouraged him to take deep breaths to avoid atelectasis, brace with pillow if needed. Follow up if symptoms persist or worsen.

## 2025-01-02 NOTE — ASSESSMENT & PLAN NOTE
Slightly elevated but has not had meds this AM. Continue to monitor at home and keep log. Follow up in clinic in 2 weeks for recheck of BP and bring log.   Lifestyle changes to help lower blood pressure reviewed, DASH eating plan encouraged, Weight loss of 0.5-1 lb/week suggested, stressed the importance of routine exercise 30 minutes per day for 3-5 days/week, sodium restriction, and alcohol in moderation less than 2 drinks a day. Patient was encouraged to check blood pressure daily (at home and work), blood pressure log provided, bring BP log to next appointment, and to monitor for discussed worsening symptoms that require emergent medical attention.

## 2025-01-02 NOTE — ASSESSMENT & PLAN NOTE
Reports symptoms improved since starting Testosterone supplementation. PSA obtained today. Continue at current dosage. Follow up in 3 months or as needed,

## 2025-01-03 NOTE — PROGRESS NOTES
Labs reviewed: Vitamin D low. Make sure he is taking Ergocalciferol weekly as ordered. PSA normal. Please let him know I did not see a fracture on his chest Xray but they have not read it yet.

## 2025-02-04 ENCOUNTER — PATIENT OUTREACH (OUTPATIENT)
Facility: HOSPITAL | Age: 45
End: 2025-02-04
Payer: COMMERCIAL

## 2025-02-04 VITALS — DIASTOLIC BLOOD PRESSURE: 82 MMHG | SYSTOLIC BLOOD PRESSURE: 128 MMHG

## 2025-02-04 NOTE — PROGRESS NOTES
Population Health Chart Review & Patient Outreach Details    Updates Requested / Reviewed:  [x]  Care Team Updated      Health Maintenance Topics Addressed and Outreach Outcomes / Actions Taken:  Blood Pressure Control [x] Telephone outreach for remote bp reading. N/a left voicemail. Returned phone call. Remote bp updated.

## 2025-02-13 NOTE — PROGRESS NOTES
08/28/2023   --Chart accessed for:Care Gaps  --Care Gaps addressed:HIV, Hep C, and colonoscopy  [facility] and [facility] reviewed.  Immunization Database ([employer]/MIXX) reviewed. Vaccinations uploaded:n/a  Requested colonoscopy records from [facility]  Uploaded pt HIV and Hep C from [facility]    Health Maintenance Due   Topic Date Due    Colorectal Cancer Screening  Never done      Goal Outcome Evaluation:              Pt d/c to [facility]. Daughter to transport

## 2025-02-28 ENCOUNTER — OFFICE VISIT (OUTPATIENT)
Dept: FAMILY MEDICINE | Facility: CLINIC | Age: 45
End: 2025-02-28
Payer: COMMERCIAL

## 2025-02-28 VITALS
WEIGHT: 245.81 LBS | HEIGHT: 71 IN | OXYGEN SATURATION: 98 % | SYSTOLIC BLOOD PRESSURE: 122 MMHG | RESPIRATION RATE: 19 BRPM | HEART RATE: 76 BPM | BODY MASS INDEX: 34.41 KG/M2 | DIASTOLIC BLOOD PRESSURE: 64 MMHG | TEMPERATURE: 98 F

## 2025-02-28 DIAGNOSIS — G89.29 CHRONIC PAIN OF LEFT KNEE: ICD-10-CM

## 2025-02-28 DIAGNOSIS — B00.9 HERPES: ICD-10-CM

## 2025-02-28 DIAGNOSIS — E29.1 TESTOSTERONE DEFICIENCY IN MALE: ICD-10-CM

## 2025-02-28 DIAGNOSIS — M25.562 CHRONIC PAIN OF LEFT KNEE: ICD-10-CM

## 2025-02-28 DIAGNOSIS — F90.9 ATTENTION DEFICIT HYPERACTIVITY DISORDER (ADHD), UNSPECIFIED ADHD TYPE: ICD-10-CM

## 2025-02-28 DIAGNOSIS — I10 PRIMARY HYPERTENSION: Primary | ICD-10-CM

## 2025-02-28 DIAGNOSIS — Z12.11 SCREENING FOR COLON CANCER: ICD-10-CM

## 2025-02-28 PROCEDURE — 99213 OFFICE O/P EST LOW 20 MIN: CPT | Mod: ,,, | Performed by: NURSE PRACTITIONER

## 2025-02-28 PROCEDURE — 3074F SYST BP LT 130 MM HG: CPT | Mod: CPTII,,, | Performed by: NURSE PRACTITIONER

## 2025-02-28 PROCEDURE — 3008F BODY MASS INDEX DOCD: CPT | Mod: CPTII,,, | Performed by: NURSE PRACTITIONER

## 2025-02-28 PROCEDURE — 1159F MED LIST DOCD IN RCRD: CPT | Mod: CPTII,,, | Performed by: NURSE PRACTITIONER

## 2025-02-28 PROCEDURE — 4010F ACE/ARB THERAPY RXD/TAKEN: CPT | Mod: CPTII,,, | Performed by: NURSE PRACTITIONER

## 2025-02-28 PROCEDURE — 3078F DIAST BP <80 MM HG: CPT | Mod: CPTII,,, | Performed by: NURSE PRACTITIONER

## 2025-02-28 PROCEDURE — 1160F RVW MEDS BY RX/DR IN RCRD: CPT | Mod: CPTII,,, | Performed by: NURSE PRACTITIONER

## 2025-02-28 RX ORDER — LISINOPRIL 20 MG/1
20 TABLET ORAL DAILY
Qty: 90 TABLET | Refills: 1 | Status: SHIPPED | OUTPATIENT
Start: 2025-02-28 | End: 2026-02-28

## 2025-02-28 RX ORDER — TESTOSTERONE CYPIONATE 200 MG/ML
200 INJECTION, SOLUTION INTRAMUSCULAR
Qty: 2 ML | Refills: 3 | Status: SHIPPED | OUTPATIENT
Start: 2025-02-28 | End: 2025-02-28

## 2025-02-28 RX ORDER — VALACYCLOVIR HYDROCHLORIDE 500 MG/1
500 TABLET, FILM COATED ORAL 2 TIMES DAILY
Qty: 180 TABLET | Refills: 1 | Status: SHIPPED | OUTPATIENT
Start: 2025-02-28 | End: 2025-08-27

## 2025-02-28 RX ORDER — TESTOSTERONE CYPIONATE 200 MG/ML
100 INJECTION, SOLUTION INTRAMUSCULAR WEEKLY
Qty: 2 ML | Refills: 2 | Status: SHIPPED | OUTPATIENT
Start: 2025-02-28 | End: 2025-08-29

## 2025-02-28 RX ORDER — MELOXICAM 15 MG/1
15 TABLET ORAL DAILY
Qty: 90 TABLET | Refills: 1 | Status: SHIPPED | OUTPATIENT
Start: 2025-02-28

## 2025-02-28 NOTE — PROGRESS NOTES
Rocio Talamantes NP   [facility]  [address]  [place]  [address]      PATIENT NAME: Hemanth Cottrell  : 1980  DATE: 25  MRN: [id number]      Billing Provider: Rocio Talamantes NP  Level of Service: IN OFFICE/OUTPT VISIT, EST, LEVL III, 20-29 MIN  Patient PCP Information       Provider PCP Type    Rocio Talamantes NP General            Reason for Visit / Chief Complaint: Low Testosterone (Med refill requested. Last lab check 10/30/2024), Hypertension (Med refill), ADHD, and Knee Pain (Left knee 2/10 pain scale and knee popping x3 weeks)         History of Present Illness / Problem Focused Workflow     45 year old male presents to clinic for refill on testosterone. He states he can definitely tell a difference when taking Testosterone however it seems to wear off before 2 weeks is up. Interested in going to once weekly dosing.  He has been monitoring blood pressure at home and readings have been 120//93. He was concerned about HTN so he stopped Vyvanse about a month ago due to concerns it was running it up. However, blood pressure has not changed since stopping Vyvanse. He reports his concentration and anxiety/mood has been much worse since stopping Vyvanse but wanted to discuss before he resumed it.   He reports left knee pain which is present on medial aspect and is worse when knee moves to the side. He reports taking Naproxen at home which has helped some. Declines Xray today due to he is going out of town but would like to try a different NSAID.           Review of Systems     @Review of Systems   Constitutional:  Positive for fatigue. Negative for activity change, appetite change and fever.   HENT:  Negative for nasal congestion, ear pain, rhinorrhea, sinus pressure/congestion and sore throat.    Eyes:  Negative for pain, redness, visual disturbance and eye dryness.   Respiratory:  Negative for cough and shortness of breath.    Cardiovascular:  Negative for chest pain  and leg swelling.   Gastrointestinal:  Negative for abdominal distention, abdominal pain, constipation and diarrhea.   Endocrine: Negative for cold intolerance, heat intolerance and polyuria.   Genitourinary:  Negative for bladder incontinence, dysuria, frequency and urgency.   Musculoskeletal:  Positive for arthralgias and leg pain. Negative for gait problem and myalgias.   Integumentary:  Negative for color change, rash and wound.   Allergic/Immunologic: Negative for environmental allergies and food allergies.   Neurological:  Positive for headaches. Negative for dizziness, weakness and light-headedness.   Psychiatric/Behavioral:  Positive for decreased concentration and dysphoric mood. Negative for behavioral problems and sleep disturbance. The patient is nervous/anxious.        Medical / Social / Family History     Past Medical History:   Diagnosis Date    Achilles tendinitis     Allergic rhinitis     Anxiety disorder     COVID-19 08/2021    x 2 (02/2021)    Depressive disorder     GERD (gastroesophageal reflux disease)     Recurrent genital herpes simplex type 2 infection     Taking multiple medications for chronic disease     Urinary retention 05/05/2022    PVR per ED 5/1/22 337 ml       Past Surgical History:   Procedure Laterality Date    CHOLECYSTECTOMY  10/30/2023    COLONOSCOPY      ESOPHAGOGASTRODUODENOSCOPY      KNEE ARTHROSCOPY Left 2000    LAPAROSCOPIC CHOLECYSTECTOMY N/A 10/30/2023    Procedure: CHOLECYSTECTOMY, LAPAROSCOPIC;  Surgeon: Georgina Espinoza MD;  Location: [facility];  Service: General;  Laterality: N/A;    VASECTOMY  2005       Medications and Allergies     Medications  Outpatient Medications Marked as Taking for the 2/28/25 encounter (Office Visit) with Rocio Talamantes NP   Medication Sig Dispense Refill    ergocalciferol (ERGOCALCIFEROL) 50,000 unit Cap Take 1 capsule (50,000 Units total) by mouth every 7 days. 12 capsule 1    lisdexamfetamine (VYVANSE) 40 MG Cap Take 1 capsule (40  mg total) by mouth once daily. 30 capsule 0    lisdexamfetamine (VYVANSE) 40 MG Cap Take 1 capsule (40 mg total) by mouth once daily. 30 capsule 0    omeprazole (PRILOSEC) 20 MG capsule Take 20 mg by mouth once daily.      [DISCONTINUED] lisinopriL (PRINIVIL,ZESTRIL) 20 MG tablet Take 1 tablet (20 mg total) by mouth once daily. 90 tablet 1    [DISCONTINUED] testosterone cypionate (DEPOTESTOTERONE CYPIONATE) 200 mg/mL injection Inject 1 mL (200 mg total) into the muscle every 14 (fourteen) days. 2 mL 3    [DISCONTINUED] valACYclovir (VALTREX) 500 MG tablet Take 1 tablet (500 mg total) by mouth 2 (two) times daily. 180 tablet 1       Allergies  Review of patient's allergies indicates:  No Known Allergies    Physical Examination     Vitals:    02/28/25 1320   BP: 122/64   Pulse: 76   Resp: 19   Temp: 97.8 °F (36.6 °C)     Physical Exam  Vitals and nursing note reviewed.   HENT:      Head: Normocephalic.      Nose: Nose normal.      Mouth/Throat:      Mouth: Mucous membranes are moist.      Pharynx: Oropharynx is clear. No posterior oropharyngeal erythema.   Eyes:      Conjunctiva/sclera: Conjunctivae normal.   Cardiovascular:      Rate and Rhythm: Normal rate and regular rhythm.      Pulses: Normal pulses.      Heart sounds: Normal heart sounds.   Pulmonary:      Effort: Pulmonary effort is normal.      Breath sounds: Normal breath sounds.   Abdominal:      General: Abdomen is flat. Bowel sounds are normal. There is no distension.      Palpations: Abdomen is soft.   Musculoskeletal:         General: No swelling.      Cervical back: Normal range of motion.      Left knee: Decreased range of motion. Tenderness present over the medial joint line and MCL.      Right lower leg: No edema.      Left lower leg: No edema.   Skin:     General: Skin is warm and dry.      Capillary Refill: Capillary refill takes less than 2 seconds.   Neurological:      Mental Status: He is alert. Mental status is at baseline.   Psychiatric:          Mood and Affect: Mood normal.         Behavior: Behavior normal.               Lab Results   Component Value Date    WBC 9.76 10/30/2024    HGB 15.9 10/30/2024    HCT 49.2 10/30/2024    MCV 94.6 10/30/2024     10/30/2024        CMP  Sodium   Date Value Ref Range Status   10/30/2024 136 136 - 145 mmol/L Final     Potassium   Date Value Ref Range Status   10/30/2024 3.7 3.5 - 5.1 mmol/L Final     Chloride   Date Value Ref Range Status   10/30/2024 104 98 - 107 mmol/L Final     CO2   Date Value Ref Range Status   10/30/2024 30 21 - 32 mmol/L Final     Glucose   Date Value Ref Range Status   10/30/2024 86 74 - 106 mg/dL Final     BUN   Date Value Ref Range Status   10/30/2024 9 7 - 18 mg/dL Final     Creatinine   Date Value Ref Range Status   10/30/2024 0.91 0.70 - 1.30 mg/dL Final     Calcium   Date Value Ref Range Status   10/30/2024 8.6 8.5 - 10.1 mg/dL Final     Total Protein   Date Value Ref Range Status   10/30/2024 7.4 6.4 - 8.2 g/dL Final     Albumin   Date Value Ref Range Status   10/30/2024 4.1 3.5 - 5.0 g/dL Final     Bilirubin, Total   Date Value Ref Range Status   10/30/2024 0.9 >0.0 - 1.2 mg/dL Final     Alk Phos   Date Value Ref Range Status   10/30/2024 61 45 - 115 U/L Final     AST   Date Value Ref Range Status   10/30/2024 26 15 - 37 U/L Final     ALT   Date Value Ref Range Status   10/30/2024 43 16 - 61 U/L Final     Anion Gap   Date Value Ref Range Status   10/30/2024 6 (L) 7 - 16 mmol/L Final     eGFR   Date Value Ref Range Status   10/30/2024 105 >=60 mL/min/1.73m2 Final     Procedures   Assessment and Plan (including Health Maintenance)   :    Plan:     Problem List Items Addressed This Visit       Attention deficit hyperactivity disorder (ADHD)    Current Assessment & Plan   Has not been taking Vyvanse due to concerns it was elevating blood pressure. However he has not noticed a difference in BP since stopping and his concentration and mood has been much worse. Will resume him at  previous dosage of Vyvanse.  checked and is good. Follow up in 3 months or as needed.          Herpes    Overview   Continue current medication         Relevant Medications    valACYclovir (VALTREX) 500 MG tablet    Primary hypertension - Primary    Current Assessment & Plan   Well controlled today in clinic. He states he has had some higher readings at home but is not sure how accurate his cuff is. Encouraged him to continue current meds and continue to monitor. Keep BP log. Return to clinic with log and BP machine so that we can check against a manual reading. Lifestyle changes to help lower blood pressure reviewed, DASH eating plan encouraged, Weight loss of 0.5-1 lb/week suggested, stressed the importance of routine exercise 30 minutes per day for 3-5 days/week, sodium restriction, and alcohol in moderation less than 2 drinks a day. Patient was encouraged to check blood pressure daily (at home and work), blood pressure log provided, bring BP log to next appointment, and to monitor for discussed worsening symptoms that require emergent medical attention.           Relevant Medications    lisinopriL (PRINIVIL,ZESTRIL) 20 MG tablet    Testosterone deficiency in male    Current Assessment & Plan   Reports symptoms improved since starting Testosterone supplementation but feels it wears off before 14 days is up. Will continue at current dosage but split in to once weekly injections of 100mcg per week. Follow up in 3 months or as needed,          Relevant Medications    testosterone cypionate (DEPOTESTOTERONE CYPIONATE) 200 mg/mL injection    Chronic pain of left knee    Current Assessment & Plan   He reports left knee pain which is present on medial aspect and is worse when knee moves to the side. He reports taking Naproxen at home which has helped some. Declines Xray today due to he is going out of town but would like to try a different NSAID. Will start on Mobic.           Other Visit Diagnoses         Screening  for colon cancer        Relevant Orders    Colonoscopy            Health Maintenance Topics with due status: Not Due       Topic Last Completion Date    TETANUS VACCINE 01/21/2022    Hemoglobin A1c (Diabetic Prevention Screening) 10/30/2024    Lipid Panel 10/30/2024    RSV Vaccine (Age 60+ and Pregnant patients) Not Due       No future appointments.     Health Maintenance Due   Topic Date Due    Colorectal Cancer Screening  Never done          Signature:  Rocio Talamantes NP  [facility]  [address]  [address]    Date of encounter: 2/28/25

## 2025-03-06 PROBLEM — M25.562 CHRONIC PAIN OF LEFT KNEE: Status: ACTIVE | Noted: 2025-03-06

## 2025-03-06 PROBLEM — G89.29 CHRONIC PAIN OF LEFT KNEE: Status: ACTIVE | Noted: 2025-03-06

## 2025-03-07 NOTE — ASSESSMENT & PLAN NOTE
Well controlled today in clinic. He states he has had some higher readings at home but is not sure how accurate his cuff is. Encouraged him to continue current meds and continue to monitor. Keep BP log. Return to clinic with log and BP machine so that we can check against a manual reading. Lifestyle changes to help lower blood pressure reviewed, DASH eating plan encouraged, Weight loss of 0.5-1 lb/week suggested, stressed the importance of routine exercise 30 minutes per day for 3-5 days/week, sodium restriction, and alcohol in moderation less than 2 drinks a day. Patient was encouraged to check blood pressure daily (at home and work), blood pressure log provided, bring BP log to next appointment, and to monitor for discussed worsening symptoms that require emergent medical attention.

## 2025-03-07 NOTE — ASSESSMENT & PLAN NOTE
Reports symptoms improved since starting Testosterone supplementation but feels it wears off before 14 days is up. Will continue at current dosage but split in to once weekly injections of 100mcg per week. Follow up in 3 months or as needed,

## 2025-03-07 NOTE — ASSESSMENT & PLAN NOTE
He reports left knee pain which is present on medial aspect and is worse when knee moves to the side. He reports taking Naproxen at home which has helped some. Declines Xray today due to he is going out of town but would like to try a different NSAID. Will start on Mobic.

## 2025-03-19 ENCOUNTER — RESULTS FOLLOW-UP (OUTPATIENT)
Dept: FAMILY MEDICINE | Facility: CLINIC | Age: 45
End: 2025-03-19

## 2025-03-19 DIAGNOSIS — E29.1 TESTOSTERONE DEFICIENCY IN MALE: Primary | ICD-10-CM

## 2025-03-20 NOTE — PROGRESS NOTES
Hgb and Hct normal. Testosterone will take about a week to come back. Will notify when results received.

## 2025-03-28 NOTE — PROGRESS NOTES
Testosterone level was again very low. Lower than it was before we started therapy 4 months ago. He said he felt as if it was not lasting him the whole 2 weeks so we recently changed him to once weekly dosing. We will recheck levels in 3 months to see where they are after doing the weekly dosing. I have attempted to contact patient via phone, no answer.

## 2025-05-29 DIAGNOSIS — F90.9 ATTENTION DEFICIT HYPERACTIVITY DISORDER (ADHD), UNSPECIFIED ADHD TYPE: ICD-10-CM

## 2025-05-29 NOTE — TELEPHONE ENCOUNTER
Copied from CRM #[id number]. Topic: Medications - Medication Refill  >> May 29, 2025  4:04 PM Ben wrote:  Who Called: Hemanth Cottrell    Refill or New Rx:Refill  RX Name and Strength:lisdexamfetamine (VYVANSE) 40 MG Cap 30 capsule 0 1/2/2025 - No  Sig - Route: Take 1 capsule (40 mg total) by mouth once daily. - Oral  Patient not taking: Reported on 2/28/2025  Sent to pharmacy as: lisdexamfetamine (VYVANSE) 40 MG Cap  Class: Normal      How is the patient currently taking it? (ex. 1XDay):1x day  Is this a 30 day or 90 day RX:30day    [facility] [address] [address]  Phone: [phone] Fax: [phone]  Hours: Not open 24 hours      Ordering Provider: Rocio Talamantes      Preferred Method of Contact: Phone Call  Patient's Preferred Phone Number on File: [phone]   Best Call Back Number, if different:  Additional Information: Patient stated that he is out of the medication listed above and he does not see Rocio until Jun 13 and would like to know if she could send enough of the medication until he comes to his appointment. He would like a call back if and when it is sent the pharmacy    Last ov 02/28/2025, UDS 01/02/2025, upcoming appt 06/13/2025

## 2025-05-30 RX ORDER — LISDEXAMFETAMINE DIMESYLATE 40 MG/1
40 CAPSULE ORAL DAILY
Qty: 30 CAPSULE | Refills: 0 | Status: SHIPPED | OUTPATIENT
Start: 2025-05-30

## 2025-06-13 ENCOUNTER — OFFICE VISIT (OUTPATIENT)
Dept: FAMILY MEDICINE | Facility: CLINIC | Age: 45
End: 2025-06-13
Payer: COMMERCIAL

## 2025-06-13 VITALS
TEMPERATURE: 98 F | WEIGHT: 238.38 LBS | HEIGHT: 71 IN | OXYGEN SATURATION: 97 % | BODY MASS INDEX: 33.37 KG/M2 | HEART RATE: 76 BPM | RESPIRATION RATE: 18 BRPM

## 2025-06-13 DIAGNOSIS — Z79.899 ENCOUNTER FOR LONG-TERM (CURRENT) USE OF MEDICATIONS: ICD-10-CM

## 2025-06-13 DIAGNOSIS — E29.1 TESTOSTERONE DEFICIENCY IN MALE: ICD-10-CM

## 2025-06-13 DIAGNOSIS — B00.9 HERPES: ICD-10-CM

## 2025-06-13 DIAGNOSIS — F90.9 ATTENTION DEFICIT HYPERACTIVITY DISORDER (ADHD), UNSPECIFIED ADHD TYPE: Primary | ICD-10-CM

## 2025-06-13 DIAGNOSIS — I10 PRIMARY HYPERTENSION: ICD-10-CM

## 2025-06-13 DIAGNOSIS — E55.9 VITAMIN D DEFICIENCY, UNSPECIFIED: ICD-10-CM

## 2025-06-13 LAB
ALBUMIN SERPL BCP-MCNC: 4.4 G/DL (ref 3.5–5)
ALBUMIN/GLOB SERPL: 1.5 {RATIO}
ALP SERPL-CCNC: 62 U/L (ref 40–150)
ALT SERPL W P-5'-P-CCNC: 39 U/L
ANION GAP SERPL CALCULATED.3IONS-SCNC: 10 MMOL/L (ref 7–16)
AST SERPL W P-5'-P-CCNC: 31 U/L (ref 11–45)
BASOPHILS # BLD AUTO: 0.04 K/UL (ref 0–0.2)
BASOPHILS NFR BLD AUTO: 0.7 % (ref 0–1)
BILIRUB SERPL-MCNC: 1.6 MG/DL
BUN SERPL-MCNC: 12 MG/DL (ref 9–21)
BUN/CREAT SERPL: 13 (ref 6–20)
CALCIUM SERPL-MCNC: 8.9 MG/DL (ref 8.4–10.2)
CHLORIDE SERPL-SCNC: 105 MMOL/L (ref 98–107)
CHOLEST SERPL-MCNC: 168 MG/DL
CHOLEST/HDLC SERPL: 4.8 {RATIO}
CO2 SERPL-SCNC: 26 MMOL/L (ref 22–29)
CREAT SERPL-MCNC: 0.93 MG/DL (ref 0.72–1.25)
CTP QC/QA: YES
DIFFERENTIAL METHOD BLD: ABNORMAL
EGFR (NO RACE VARIABLE) (RUSH/TITUS): 102 ML/MIN/1.73M2
EOSINOPHIL # BLD AUTO: 0.14 K/UL (ref 0–0.5)
EOSINOPHIL NFR BLD AUTO: 2.5 % (ref 1–4)
ERYTHROCYTE [DISTWIDTH] IN BLOOD BY AUTOMATED COUNT: 12.8 % (ref 11.5–14.5)
EST. AVERAGE GLUCOSE BLD GHB EST-MCNC: 103 MG/DL
ESTRADIOL SERPL-MCNC: 41 PG/ML
GLOBULIN SER-MCNC: 2.9 G/DL (ref 2–4)
GLUCOSE SERPL-MCNC: 96 MG/DL (ref 74–100)
HBA1C MFR BLD HPLC: 5.2 %
HCT VFR BLD AUTO: 51 % (ref 40–54)
HDLC SERPL-MCNC: 35 MG/DL (ref 35–60)
HGB BLD-MCNC: 17.1 G/DL (ref 13.5–18)
IMM GRANULOCYTES # BLD AUTO: 0.02 K/UL (ref 0–0.04)
IMM GRANULOCYTES NFR BLD: 0.4 % (ref 0–0.4)
LDLC SERPL CALC-MCNC: 103 MG/DL
LDLC/HDLC SERPL: 2.9 {RATIO}
LH SERPL-ACNC: 0.3 MIU/ML
LYMPHOCYTES # BLD AUTO: 1.81 K/UL (ref 1–4.8)
LYMPHOCYTES NFR BLD AUTO: 32.4 % (ref 27–41)
MCH RBC QN AUTO: 30.5 PG (ref 27–31)
MCHC RBC AUTO-ENTMCNC: 33.5 G/DL (ref 32–36)
MCV RBC AUTO: 90.9 FL (ref 80–96)
MONOCYTES # BLD AUTO: 0.44 K/UL (ref 0–0.8)
MONOCYTES NFR BLD AUTO: 7.9 % (ref 2–6)
MPC BLD CALC-MCNC: 9.6 FL (ref 9.4–12.4)
NEUTROPHILS # BLD AUTO: 3.13 K/UL (ref 1.8–7.7)
NEUTROPHILS NFR BLD AUTO: 56.1 % (ref 53–65)
NONHDLC SERPL-MCNC: 133 MG/DL
NRBC # BLD AUTO: 0 X10E3/UL
NRBC, AUTO (.00): 0 %
PLATELET # BLD AUTO: 223 K/UL (ref 150–400)
POC (AMP) AMPHETAMINE: NEGATIVE
POC (BAR) BARBITURATES: NEGATIVE
POC (BUP) BUPRENORPHINE: NEGATIVE
POC (BZO) BENZODIAZEPINES: NEGATIVE
POC (COC) COCAINE: NEGATIVE
POC (MDMA) METHYLENEDIOXYMETHAMPHETAMINE 3,4: NEGATIVE
POC (MET) METHAMPHETAMINE: NEGATIVE
POC (MOP) OPIATES: NEGATIVE
POC (MTD) METHADONE: NEGATIVE
POC (OXY) OXYCODONE: NEGATIVE
POC (PCP) PHENCYCLIDINE: NEGATIVE
POC (TCA) TRICYCLIC ANTIDEPRESSANTS: NEGATIVE
POC TEMPERATURE (URINE): 92
POC THC: NEGATIVE
POTASSIUM SERPL-SCNC: 4.1 MMOL/L (ref 3.5–5.1)
PROT SERPL-MCNC: 7.3 G/DL (ref 6.4–8.3)
RBC # BLD AUTO: 5.61 M/UL (ref 4.6–6.2)
SODIUM SERPL-SCNC: 137 MMOL/L (ref 136–145)
TRIGL SERPL-MCNC: 148 MG/DL (ref 34–140)
VLDLC SERPL-MCNC: 30 MG/DL
WBC # BLD AUTO: 5.58 K/UL (ref 4.5–11)

## 2025-06-13 PROCEDURE — 83036 HEMOGLOBIN GLYCOSYLATED A1C: CPT | Mod: ,,, | Performed by: CLINICAL MEDICAL LABORATORY

## 2025-06-13 PROCEDURE — 82670 ASSAY OF TOTAL ESTRADIOL: CPT | Mod: ,,, | Performed by: CLINICAL MEDICAL LABORATORY

## 2025-06-13 PROCEDURE — 80305 DRUG TEST PRSMV DIR OPT OBS: CPT | Mod: QW,,, | Performed by: NURSE PRACTITIONER

## 2025-06-13 PROCEDURE — 3044F HG A1C LEVEL LT 7.0%: CPT | Mod: CPTII,,, | Performed by: NURSE PRACTITIONER

## 2025-06-13 PROCEDURE — 1160F RVW MEDS BY RX/DR IN RCRD: CPT | Mod: CPTII,,, | Performed by: NURSE PRACTITIONER

## 2025-06-13 PROCEDURE — 80053 COMPREHEN METABOLIC PANEL: CPT | Mod: ,,, | Performed by: CLINICAL MEDICAL LABORATORY

## 2025-06-13 PROCEDURE — 1159F MED LIST DOCD IN RCRD: CPT | Mod: CPTII,,, | Performed by: NURSE PRACTITIONER

## 2025-06-13 PROCEDURE — 85025 COMPLETE CBC W/AUTO DIFF WBC: CPT | Mod: ,,, | Performed by: CLINICAL MEDICAL LABORATORY

## 2025-06-13 PROCEDURE — 80061 LIPID PANEL: CPT | Mod: ,,, | Performed by: CLINICAL MEDICAL LABORATORY

## 2025-06-13 PROCEDURE — 83002 ASSAY OF GONADOTROPIN (LH): CPT | Mod: ,,, | Performed by: CLINICAL MEDICAL LABORATORY

## 2025-06-13 PROCEDURE — 3008F BODY MASS INDEX DOCD: CPT | Mod: CPTII,,, | Performed by: NURSE PRACTITIONER

## 2025-06-13 PROCEDURE — 99213 OFFICE O/P EST LOW 20 MIN: CPT | Mod: ,,, | Performed by: NURSE PRACTITIONER

## 2025-06-13 PROCEDURE — 4010F ACE/ARB THERAPY RXD/TAKEN: CPT | Mod: CPTII,,, | Performed by: NURSE PRACTITIONER

## 2025-06-13 RX ORDER — TESTOSTERONE CYPIONATE 200 MG/ML
100 INJECTION, SOLUTION INTRAMUSCULAR WEEKLY
Qty: 2 ML | Refills: 2 | Status: SHIPPED | OUTPATIENT
Start: 2025-06-13 | End: 2025-12-12

## 2025-06-13 RX ORDER — LISDEXAMFETAMINE DIMESYLATE 40 MG/1
40 CAPSULE ORAL DAILY
Qty: 30 CAPSULE | Refills: 0 | Status: SHIPPED | OUTPATIENT
Start: 2025-06-13

## 2025-06-13 RX ORDER — LISINOPRIL 20 MG/1
20 TABLET ORAL DAILY
Qty: 90 TABLET | Refills: 1 | Status: SHIPPED | OUTPATIENT
Start: 2025-06-13 | End: 2026-06-13

## 2025-06-13 RX ORDER — VALACYCLOVIR HYDROCHLORIDE 500 MG/1
500 TABLET, FILM COATED ORAL 2 TIMES DAILY
Qty: 180 TABLET | Refills: 1 | Status: SHIPPED | OUTPATIENT
Start: 2025-06-13 | End: 2025-12-10

## 2025-06-13 RX ORDER — ERGOCALCIFEROL 1.25 MG/1
50000 CAPSULE ORAL
Qty: 12 CAPSULE | Refills: 1 | Status: SHIPPED | OUTPATIENT
Start: 2025-06-13

## 2025-06-13 NOTE — PROGRESS NOTES
Rocio Talamantes NP   [place] [id number] [place]  [address]      PATIENT NAME: Hemanth Cottrell  : 1980  DATE: 25  MRN: [id number]      Level of Service: [place] OFFICE/OUTPT VISIT, GANGA HEALY III, 20-29 MIN  PCP: Rocio Talamantes NP     Reason for Visit / Chief Complaint: Medication Refill (Patient presents to clinic for medication refill. )     History of Present Illness / Review of Systems   History of Present Illness    CHIEF COMPLAINT:  Patient presents for a follow-up visit to discuss medication management and recent lab results, including testosterone levels.    HPI:  Patient reports improved response to testosterone treatment since changing to weekly injections at the last visit, noting more consistent effects without previous wear-off issues. He confirms continued use of OTC Prilosec and inconsistent use of vitamin D supplements, occasionally forgetting to take it. His blood pressure is well-controlled today. He is currently taking Vyvanse 40 mg daily for focus issues. He denies any new or worsening symptoms.      ROS:  General: -fever, -chills, -fatigue, -weight gain, -weight loss  Eyes: -vision changes, -redness, -discharge  ENT: -ear pain, -nasal congestion, -sore throat  Cardiovascular: -chest pain, -palpitations, -lower extremity edema  Respiratory: -cough, -shortness of breath  Gastrointestinal: -abdominal pain, -nausea, -vomiting, -diarrhea, -constipation, -blood in stool  Genitourinary: -dysuria, -hematuria, -frequency  Musculoskeletal: -joint pain, -muscle pain  Skin: -rash, -lesion  Neurological: -headache, -dizziness, -numbness, -tingling  Psychiatric: -anxiety, -depression, -sleep difficulty          Medical / Social / Family History     Past Medical History:   Diagnosis Date    Achilles tendinitis     Allergic rhinitis     Anxiety disorder     COVID-19 08/2021    x 2 (2021)    Depressive disorder     GERD (gastroesophageal reflux disease)     Recurrent  genital herpes simplex type 2 infection     Taking multiple medications for chronic disease     Urinary retention 05/05/2022    PVR per ED 5/1/22 337 ml       Past Surgical History:   Procedure Laterality Date    CHOLECYSTECTOMY  10/30/2023    COLONOSCOPY      ESOPHAGOGASTRODUODENOSCOPY      KNEE ARTHROSCOPY Left 2000    LAPAROSCOPIC CHOLECYSTECTOMY N/A 10/30/2023    Procedure: CHOLECYSTECTOMY, LAPAROSCOPIC;  Surgeon: Georgina Espinoza MD;  Location: [facility];  Service: General;  Laterality: N/A;    VASECTOMY  2005       Medications and Allergies     Outpatient Medications Marked as Taking for the 6/13/25 encounter (Office Visit) with Rocio Talamantes NP   Medication Sig Dispense Refill    lisdexamfetamine (VYVANSE) 40 MG Cap Take 1 capsule (40 mg total) by mouth once daily. 30 capsule 0    lisdexamfetamine (VYVANSE) 40 MG Cap Take 1 capsule (40 mg total) by mouth once daily. 30 capsule 0    meloxicam (MOBIC) 15 MG tablet Take 1 tablet (15 mg total) by mouth once daily. 90 tablet 1    omeprazole (PRILOSEC) 20 MG capsule Take 20 mg by mouth once daily.      [DISCONTINUED] ergocalciferol (ERGOCALCIFEROL) 50,000 unit Cap Take 1 capsule (50,000 Units total) by mouth every 7 days. 12 capsule 1    [DISCONTINUED] lisdexamfetamine (VYVANSE) 40 MG Cap Take 1 capsule (40 mg total) by mouth once daily. 30 capsule 0    [DISCONTINUED] lisinopriL (PRINIVIL,ZESTRIL) 20 MG tablet Take 1 tablet (20 mg total) by mouth once daily. 90 tablet 1    [DISCONTINUED] testosterone cypionate (DEPOTESTOTERONE CYPIONATE) 200 mg/mL injection Inject 0.5 mLs (100 mg total) into the muscle once a week. 2 mL 2    [DISCONTINUED] valACYclovir (VALTREX) 500 MG tablet Take 1 tablet (500 mg total) by mouth 2 (two) times daily. 180 tablet 1       Review of patient's allergies indicates:  No Known Allergies    Physical Examination     Vitals:    06/13/25 0925   BP: (P) 128/84   Pulse: 76   Resp: 18   Temp: 97.8 °F (36.6 °C)     Physical Exam  Vitals  and nursing note reviewed.   HENT:      Head: Normocephalic.      Nose: Nose normal.      Mouth/Throat:      Mouth: Mucous membranes are moist.      Pharynx: Oropharynx is clear. No posterior oropharyngeal erythema.   Eyes:      Conjunctiva/sclera: Conjunctivae normal.   Cardiovascular:      Rate and Rhythm: Normal rate and regular rhythm.      Pulses: Normal pulses.      Heart sounds: Normal heart sounds.   Pulmonary:      Effort: Pulmonary effort is normal.      Breath sounds: Normal breath sounds.   Abdominal:      General: Abdomen is flat. Bowel sounds are normal. There is no distension.      Palpations: Abdomen is soft.   Musculoskeletal:         General: No swelling or tenderness. Normal range of motion.      Cervical back: Normal range of motion.      Right lower leg: No edema.      Left lower leg: No edema.   Skin:     General: Skin is warm and dry.      Capillary Refill: Capillary refill takes less than 2 seconds.   Neurological:      Mental Status: He is alert. Mental status is at baseline.   Psychiatric:         Mood and Affect: Mood normal.         Behavior: Behavior normal.                 Assessment and Plan      Problem List Items Addressed This Visit       Attention deficit hyperactivity disorder (ADHD) - Primary    Relevant Medications    lisdexamfetamine (VYVANSE) 40 MG Cap    lisdexamfetamine (VYVANSE) 40 MG Cap    lisdexamfetamine (VYVANSE) 40 MG Cap    Other Relevant Orders    POCT Urine Drug Screen Presump (Completed)    Herpes    Overview   Continue current medication         Relevant Medications    valACYclovir (VALTREX) 500 MG tablet    Vitamin D deficiency, unspecified    Relevant Medications    ergocalciferol (ERGOCALCIFEROL) 50,000 unit Cap    Primary hypertension    Relevant Medications    lisinopriL (PRINIVIL,ZESTRIL) 20 MG tablet    Other Relevant Orders    Lipid Panel (Completed)    Comprehensive Metabolic Panel (Completed)    Hemoglobin A1C (Completed)    CBC Auto Differential  (Completed)    Testosterone deficiency in male    Relevant Medications    testosterone cypionate (DEPOTESTOTERONE CYPIONATE) 200 mg/mL injection    Other Relevant Orders    Testosterone, Total and Free, S    Luteinizing Hormone (Completed)    Estradiol (Completed)     Other Visit Diagnoses         Encounter for long-term (current) use of medications        Relevant Medications    ergocalciferol (ERGOCALCIFEROL) 50,000 unit Cap    Other Relevant Orders    POCT Urine Drug Screen Presump (Completed)            Assessment & Plan    IMPRESSION:  BP control improved.  Weekly testosterone administration improved stability.  Reviewed current medication regimen and made adjustments as needed.  Discussed colonoscopy options, considering insurance coverage and preference.    TESTICULAR HYPOFUNCTION:  - Rechecked testosterone levels and continued weekly dosing.  - Explained that testosterone can potentially increase estrogen levels,will obtain labs today.   - Ordered testosterone and estradiol tests to assess these potential effects due to patient's history of gynecomastia.  - Continue weekly testosterone administration.    VITAMIN D DEFICIENCY:  - Noted the patient sometimes forgets to take vitamin D.  - Continued supplementation and sent refills.    ATTENTION-DEFICIT HYPERACTIVITY DISORDER:  - Continued Vyvanse 40 mg daily with refills, next fill date set for June 30th.  - Scheduled follow up in approximately 3.5 months for Vyvanse refill.    HYPERTENSION:  - Blood pressure looks good today.  - Continued current meds and low sodium diet.    OTHER MEDICATIONS AND TESTS:  - Continued Valtrex, OTC Prilosec as needed, and Mobic as needed.  - Ordered A1C test to monitor glycemic control.    FOLLOW-UP:  - Contact office to cancel colonoscopy appointment at [facility] and schedule with Dr. Brooks at [facility].  - Provide colonoscopy results when completed at [facility].          FOLLOW UP  Will call with lab results.   Follow up in 3  months or as needed.      Health Maintenance     Health Maintenance Topics with due status: Not Due       Topic Last Completion Date    TETANUS VACCINE 01/21/2022    Hemoglobin A1c (Diabetic Prevention Screening) 06/13/2025    Lipid Panel 06/13/2025    RSV Vaccine (Age 60+ and Pregnant patients) Not Due       Health Maintenance Due   Topic Date Due    Colorectal Cancer Screening  Never done          Signature:  Rocio Talamantes NP  [place] [id number] [place]  [address]    Date of encounter: 6/13/25    This note was generated with the assistance of ambient listening technology. Verbal consent was obtained by the patient and accompanying visitor(s) for the recording of patient appointment to facilitate this note. I attest to having reviewed and edited the generated note for accuracy, though some syntax or spelling errors may persist. Please contact the author of this note for any clarification.

## 2025-06-14 ENCOUNTER — RESULTS FOLLOW-UP (OUTPATIENT)
Dept: FAMILY MEDICINE | Facility: CLINIC | Age: 45
End: 2025-06-14
Payer: COMMERCIAL

## 2025-06-14 PROBLEM — I10 PRIMARY HYPERTENSION: Status: ACTIVE | Noted: 2023-11-21

## 2025-06-14 NOTE — PROGRESS NOTES
Cholesterol slightly elevated. Follow low fat/low chol diet with increased exercise. CBC and CMP normal or clinically insignificant. His LH was slightly low, this is most likely related to his low testosterone. His estradiol was on upper end of normal. We will keep a check on this and if he starts having symptoms we will consider starting aromatase inhibitor. Hgb A1C normal at 5.2. Testosterone level still pending.

## 2025-06-23 RX ORDER — ANASTROZOLE 1 MG/1
1 TABLET ORAL WEEKLY
Qty: 12 TABLET | Refills: 1 | Status: SHIPPED | OUTPATIENT
Start: 2025-06-23

## 2025-07-02 ENCOUNTER — TELEPHONE (OUTPATIENT)
Dept: FAMILY MEDICINE | Facility: CLINIC | Age: 45
End: 2025-07-02
Payer: COMMERCIAL

## 2025-07-02 NOTE — TELEPHONE ENCOUNTER
----- Message from Rocio Talamantes NP sent at 7/2/2025  2:26 PM CDT -----  Testosterone results received. Back in normal range but low end of normal range. Continue at once weekly dosing.   ----- Message -----  From: Jannet Verde  Sent: 6/13/2025  10:39 AM CDT  To: Rocio Talamantes NP

## 2025-07-09 NOTE — TELEPHONE ENCOUNTER
Patient has since reviewed results on MY chart and providers note   Trend level  Avoid nephrotoxins medication

## 2025-08-04 ENCOUNTER — PATIENT MESSAGE (OUTPATIENT)
Facility: HOSPITAL | Age: 45
End: 2025-08-04
Payer: COMMERCIAL

## (undated) DEVICE — ELECTRODE LAPSCP L HOOK 36CM

## (undated) DEVICE — TAPE DRAPE STRIP CLSR 4.5X24IN

## (undated) DEVICE — Device

## (undated) DEVICE — GOWN NONREINF SET-IN SLV 2XL

## (undated) DEVICE — SUT 0 VICRYL / UR6 (J603)

## (undated) DEVICE — GLOVE PROTEXIS PI SYN SURG 7.5

## (undated) DEVICE — APPLIER CLIP ENDO LIGAMAX 5MM

## (undated) DEVICE — TROCAR ENDO Z THREAD KII 5X100

## (undated) DEVICE — WARMER BLUE HEAT SCOPE 3-12MM

## (undated) DEVICE — PENCIL ELECTROSURG HOLST W/BLD

## (undated) DEVICE — CANNULA LAP SEAL Z THRD 5X100

## (undated) DEVICE — SUT MONOCYRL 4-0 PS2 UND

## (undated) DEVICE — SCISSORS INSERT ELECTROSCOPE

## (undated) DEVICE — BAG TISS RETRV MONARCH 10MM

## (undated) DEVICE — GLOVE PROTEXIS PI SYN SURG 6.5

## (undated) DEVICE — NDL ECLIPSE SAFETY 23G 1.5IN

## (undated) DEVICE — IRRIGATOR ENDOSCOPY DISP.

## (undated) DEVICE — DISSECTOR KITTNER 5MM T 45CM S

## (undated) DEVICE — TROCAR KII BLLN 12MM 10CM

## (undated) DEVICE — CORD LAP 10 DISP